# Patient Record
Sex: FEMALE | Race: WHITE | NOT HISPANIC OR LATINO | Employment: OTHER | URBAN - METROPOLITAN AREA
[De-identification: names, ages, dates, MRNs, and addresses within clinical notes are randomized per-mention and may not be internally consistent; named-entity substitution may affect disease eponyms.]

---

## 2017-01-17 DIAGNOSIS — I89.0 LYMPHEDEMA OF BOTH LOWER EXTREMITIES: ICD-10-CM

## 2017-01-17 RX ORDER — OXYCODONE HYDROCHLORIDE AND ACETAMINOPHEN 5; 325 MG/1; MG/1
1 TABLET ORAL 2 TIMES DAILY PRN
Qty: 60 TAB | Refills: 0 | Status: SHIPPED | OUTPATIENT
Start: 2017-01-17 | End: 2017-06-29 | Stop reason: SDUPTHER

## 2017-01-17 NOTE — TELEPHONE ENCOUNTER
Please remind Dorcas that per [facility]'s opioid policy we must see them every 90 days to continue prescribing their opioids.

## 2017-06-29 ENCOUNTER — OFFICE VISIT (OUTPATIENT)
Dept: MEDICAL GROUP | Facility: PHYSICIAN GROUP | Age: 62
End: 2017-06-29
Payer: COMMERCIAL

## 2017-06-29 VITALS
WEIGHT: 239 LBS | BODY MASS INDEX: 34.22 KG/M2 | OXYGEN SATURATION: 97 % | DIASTOLIC BLOOD PRESSURE: 84 MMHG | RESPIRATION RATE: 16 BRPM | SYSTOLIC BLOOD PRESSURE: 120 MMHG | TEMPERATURE: 97.2 F | HEART RATE: 72 BPM | HEIGHT: 70 IN

## 2017-06-29 DIAGNOSIS — Z13.6 SCREENING FOR CARDIOVASCULAR CONDITION: ICD-10-CM

## 2017-06-29 DIAGNOSIS — E66.9 OBESITY (BMI 30-39.9): ICD-10-CM

## 2017-06-29 DIAGNOSIS — I89.0 LYMPHEDEMA OF BOTH LOWER EXTREMITIES: ICD-10-CM

## 2017-06-29 DIAGNOSIS — Z13.29 SCREENING FOR ENDOCRINE DISORDER: ICD-10-CM

## 2017-06-29 PROCEDURE — 99214 OFFICE O/P EST MOD 30 MIN: CPT | Performed by: FAMILY MEDICINE

## 2017-06-29 RX ORDER — OXYCODONE HYDROCHLORIDE AND ACETAMINOPHEN 5; 325 MG/1; MG/1
1 TABLET ORAL 2 TIMES DAILY PRN
Qty: 60 TAB | Refills: 0 | Status: SHIPPED | OUTPATIENT
Start: 2017-06-29 | End: 2017-11-14 | Stop reason: SDUPTHER

## 2017-06-29 ASSESSMENT — PATIENT HEALTH QUESTIONNAIRE - PHQ9: CLINICAL INTERPRETATION OF PHQ2 SCORE: 0

## 2017-06-29 NOTE — PROGRESS NOTES
Chief Complaint   Patient presents with   • Medication Refill     pain med       HISTORY OF PRESENT ILLNESS: Patient is a 61 y.o. female established patient here today for the following concerns:    1. Lymphedema of both lower extremities  Here today for follow up on lymph edema.  She reports that it continues to be an aggrevation.  Working on weight reduction. Reports that it is worse when she's been on her feet all day. Improved with leg elevation. Continues to use her compression device. Needs a refill on her pain medication that she uses occasionally. 60 tabs of Percocet has lasted her 6 months. She does not mix this with any alcohol or other sedative medications.    2. Screening for cardiovascular condition  She will be due for lab work in September    3. Screening for endocrine disorder  As above    4. Obesity (BMI 30-39.9)  Counseled today      Past Medical, Social, and Family history reviewed and updated in EPIC    Allergies:Review of patient's allergies indicates no known allergies.    Current Outpatient Prescriptions   Medication Sig Dispense Refill   • oxycodone-acetaminophen (PERCOCET) 5-325 MG Tab Take 1 Tab by mouth 2 times a day as needed for Moderate Pain or Severe Pain. Take 1 or 2 tabs 60 Tab 0   • celecoxib (CELEBREX) 200 MG Cap Take 1 Cap by mouth every day. 60 Cap 5   • Cholecalciferol (VITAMIN D3) 5000 UNITS Cap Take 1 Cap by mouth every day.     • FIBER PO Take  by mouth every day.     • diphenhydrAMINE (BENADRYL) 25 MG Tab Take 25 mg by mouth every 6 hours as needed for Sleep.     • Ibuprofen-Diphenhydramine Cit (IBUPROFEN PM PO) Take  by mouth as needed.       No current facility-administered medications for this visit.         ROS:  Review of Systems   Constitutional: Negative for fever, chills, weight loss and malaise/fatigue.   HENT: Negative for ear pain, nosebleeds, congestion, sore throat and neck pain.    Eyes: Negative for blurred vision.   Respiratory: Negative for cough, sputum  "production, shortness of breath and wheezing.    Cardiovascular: Negative for chest pain, palpitations,  and leg swelling.   Gastrointestinal: Negative for heartburn, nausea, vomiting, diarrhea and abdominal pain.   Genitourinary: Negative for dysuria, urgency and frequency.   Musculoskeletal: Negative for myalgias, back pain and joint pain.   Skin: Negative for rash and itching.   Neurological: Negative for dizziness, tingling, tremors, sensory change, focal weakness and headaches.   Endo/Heme/Allergies: Does not bruise/bleed easily.   Psychiatric/Behavioral: Negative for depression, anxiety, suicidal ideas, insomnia and memory loss.      Exam:  Blood pressure 120/84, pulse 72, temperature 36.2 °C (97.2 °F), resp. rate 16, height 1.778 m (5' 10\"), weight 108.41 kg (239 lb), SpO2 97 %.    General:  Well nourished, well developed in NAD  Head is grossly normal.  Neck: Supple without JVD   Pulmonary:  Normal effort.   Cardiovascular: Regular rate  Extremities: no clubbing, cyanosis, or + left-sided pitting edema.  Psych: affect appropriate      Please note that this dictation was created using voice recognition software. I have made every reasonable attempt to correct obvious errors, but I expect that there are errors of grammar and possibly content that I did not discover before finalizing the note.    Assessment/Plan:  1. Lymphedema of both lower extremities  Continue with modifications including compression, elevation of the leg, increase exercise, decrease weight renewed  - oxycodone-acetaminophen (PERCOCET) 5-325 MG Tab; Take 1 Tab by mouth 2 times a day as needed for Moderate Pain or Severe Pain. Take 1 or 2 tabs  Dispense: 60 Tab; Refill: 0   reviewed and consistent. Patient cautioned on addictive nature of the medication    2. Screening for cardiovascular condition  - LIPID PROFILE; Future  - COMP METABOLIC PANEL; Future    3. Screening for endocrine disorder  - VITAMIN D,25 HYDROXY; Future    4. Obesity " (BMI 30-39.9)  - Patient identified as having weight management issue.  Appropriate orders and counseling given.    Six-month follow-up sooner when necessary

## 2017-06-29 NOTE — MR AVS SNAPSHOT
"        Dorcas Brennany   2017 7:40 AM   Office Visit   MRN: [id number]    Department:  [facility]   Dept Phone:  [phone]    Description:  Female : 1955   Provider:  Kenia Nick M.D.           Reason for Visit     Medication Refill pain med      Allergies as of 2017     No Known Allergies      You were diagnosed with     Lymphedema of both lower extremities   [6678772]       Screening for cardiovascular condition   [241052]       Screening for endocrine disorder   [7319200]       Obesity (BMI 30-39.9)   [230305]         Vital Signs     Blood Pressure Pulse Temperature Respirations Height Weight    120/84 mmHg 72 36.2 °C (97.2 °F) 16 1.778 m (5' 10\") 108.41 kg (239 lb)    Body Mass Index Oxygen Saturation Smoking Status             34.29 kg/m2 97% Never Smoker          Basic Information     Date Of Birth Sex Race Ethnicity Preferred Language    1955 Female White Non- English      Problem List              ICD-10-CM Priority Class Noted - Resolved    Lymphedema of both lower extremities I89.0   2016 - Present    History of bilateral knee replacement Z96.653   2016 - Present    Bilateral primary osteoarthritis of first carpometacarpal joints M18.0   2016 - Present    Obesity (BMI 30-39.9) E66.9   2017 - Present      Health Maintenance        Date Due Completion Dates    IMM DTaP/Tdap/Td Vaccine (1 - Tdap) 8/15/1973 ---    MAMMOGRAM 2017, 9/15/2016, 8/10/2015, 6/10/2013, 2012, 2009, 2009, 2008, 2008, 2007, 2007    PAP SMEAR 2019    COLONOSCOPY 2020, 2005            Current Immunizations     SHINGLES VACCINE 10/1/2014      Below and/or attached are the medications your provider expects you to take. Review all of your home medications and newly ordered medications with your provider and/or pharmacist. Follow medication instructions as directed by your provider and/or " pharmacist. Please keep your medication list with you and share with your provider. Update the information when medications are discontinued, doses are changed, or new medications (including over-the-counter products) are added; and carry medication information at all times in the event of emergency situations     Allergies:  No Known Allergies          Medications  Valid as of: June 29, 2017 - 11:28 AM    Generic Name Brand Name Tablet Size Instructions for use    Celecoxib (Cap) CELEBREX 200 MG Take 1 Cap by mouth every day.        Cholecalciferol (Cap) vitamin D3 5000 UNITS Take 1 Cap by mouth every day.        DiphenhydrAMINE HCl (Tab) BENADRYL 25 MG Take 25 mg by mouth every 6 hours as needed for Sleep.        Fiber   Take  by mouth every day.        Ibuprofen-Diphenhydramine Cit   Take  by mouth as needed.        Oxycodone-Acetaminophen (Tab) PERCOCET 5-325 MG Take 1 Tab by mouth 2 times a day as needed for Moderate Pain or Severe Pain. Take 1 or 2 tabs        .                 Medicines prescribed today were sent to:     [id number] - [place] - [address]    [address] [place] [address]    Phone: [phone] Fax: [phone]    Open 24 Hours?: No      Medication refill instructions:       If your prescription bottle indicates you have medication refills left, it is not necessary to call your provider’s office. Please contact your pharmacy and they will refill your medication.    If your prescription bottle indicates you do not have any refills left, you may request refills at any time through one of the following ways: The online [employer] system (except Urgent Care), by calling your provider’s office, or by asking your pharmacy to contact your provider’s office with a refill request. Medication refills are processed only during regular business hours and may not be available until the next business day. Your provider may request additional information or to have a follow-up visit with  you prior to refilling your medication.   *Please Note: Medication refills are assigned a new Rx number when refilled electronically. Your pharmacy may indicate that no refills were authorized even though a new prescription for the same medication is available at the pharmacy. Please request the medicine by name with the pharmacy before contacting your provider for a refill.        Your To Do List     Future Labs/Procedures Complete By Expires    COMP METABOLIC PANEL  As directed 6/29/2018    LIPID PROFILE  As directed 6/29/2018    VITAMIN D,25 HYDROXY  As directed 6/29/2018         [employer] Access Code: Activation code not generated  Current [employer] Status: Active

## 2017-08-10 ENCOUNTER — APPOINTMENT (OUTPATIENT)
Dept: ADMISSIONS | Facility: MEDICAL CENTER | Age: 62
End: 2017-08-10
Attending: ORTHOPAEDIC SURGERY
Payer: COMMERCIAL

## 2017-08-10 NOTE — OR NURSING
"Preadmit appointment: \" Preparing for your Procedure information\" sheet given to patient with verbal and written instructions. Patient instructed to continue prescribed medications through the day before surgery, instructed to take the following medications the day of surgery per anesthesia protocol: Percocet if needed  "

## 2017-08-22 ENCOUNTER — APPOINTMENT (OUTPATIENT)
Dept: RADIOLOGY | Facility: MEDICAL CENTER | Age: 62
End: 2017-08-22
Attending: ORTHOPAEDIC SURGERY
Payer: COMMERCIAL

## 2017-08-22 ENCOUNTER — HOSPITAL ENCOUNTER (OUTPATIENT)
Facility: MEDICAL CENTER | Age: 62
End: 2017-08-22
Attending: ORTHOPAEDIC SURGERY | Admitting: ORTHOPAEDIC SURGERY
Payer: COMMERCIAL

## 2017-08-22 VITALS
SYSTOLIC BLOOD PRESSURE: 125 MMHG | DIASTOLIC BLOOD PRESSURE: 85 MMHG | TEMPERATURE: 96.8 F | WEIGHT: 242.51 LBS | HEIGHT: 70 IN | OXYGEN SATURATION: 97 % | HEART RATE: 78 BPM | RESPIRATION RATE: 12 BRPM | BODY MASS INDEX: 34.72 KG/M2

## 2017-08-22 PROBLEM — M18.0 PRIMARY ARTHROSIS OF FIRST CARPOMETACARPAL JOINTS, BILATERAL: Status: ACTIVE | Noted: 2017-08-22

## 2017-08-22 PROCEDURE — A4565 SLINGS: HCPCS | Performed by: ORTHOPAEDIC SURGERY

## 2017-08-22 PROCEDURE — 160028 HCHG SURGERY MINUTES - 1ST 30 MINS LEVEL 3: Performed by: ORTHOPAEDIC SURGERY

## 2017-08-22 PROCEDURE — 700102 HCHG RX REV CODE 250 W/ 637 OVERRIDE(OP): Performed by: ORTHOPAEDIC SURGERY

## 2017-08-22 PROCEDURE — 700111 HCHG RX REV CODE 636 W/ 250 OVERRIDE (IP): Performed by: ORTHOPAEDIC SURGERY

## 2017-08-22 PROCEDURE — 160046 HCHG PACU - 1ST 60 MINS PHASE II: Performed by: ORTHOPAEDIC SURGERY

## 2017-08-22 PROCEDURE — 160002 HCHG RECOVERY MINUTES (STAT): Performed by: ORTHOPAEDIC SURGERY

## 2017-08-22 PROCEDURE — 700101 HCHG RX REV CODE 250

## 2017-08-22 PROCEDURE — 160047 HCHG PACU  - EA ADDL 30 MINS PHASE II: Performed by: ORTHOPAEDIC SURGERY

## 2017-08-22 PROCEDURE — 501480 HCHG STOCKINETTE: Performed by: ORTHOPAEDIC SURGERY

## 2017-08-22 PROCEDURE — 700111 HCHG RX REV CODE 636 W/ 250 OVERRIDE (IP)

## 2017-08-22 PROCEDURE — 160039 HCHG SURGERY MINUTES - EA ADDL 1 MIN LEVEL 3: Performed by: ORTHOPAEDIC SURGERY

## 2017-08-22 PROCEDURE — 501838 HCHG SUTURE GENERAL: Performed by: ORTHOPAEDIC SURGERY

## 2017-08-22 PROCEDURE — 160035 HCHG PACU - 1ST 60 MINS PHASE I: Performed by: ORTHOPAEDIC SURGERY

## 2017-08-22 PROCEDURE — A9270 NON-COVERED ITEM OR SERVICE: HCPCS

## 2017-08-22 PROCEDURE — 502000 HCHG MISC OR IMPLANTS RC 0278: Performed by: ORTHOPAEDIC SURGERY

## 2017-08-22 PROCEDURE — A9270 NON-COVERED ITEM OR SERVICE: HCPCS | Performed by: ORTHOPAEDIC SURGERY

## 2017-08-22 PROCEDURE — 700102 HCHG RX REV CODE 250 W/ 637 OVERRIDE(OP)

## 2017-08-22 PROCEDURE — 502576 HCHG PACK, HAND: Performed by: ORTHOPAEDIC SURGERY

## 2017-08-22 PROCEDURE — 700105 HCHG RX REV CODE 258: Performed by: ORTHOPAEDIC SURGERY

## 2017-08-22 PROCEDURE — A6222 GAUZE <=16 IN NO W/SAL W/O B: HCPCS | Performed by: ORTHOPAEDIC SURGERY

## 2017-08-22 PROCEDURE — 160048 HCHG OR STATISTICAL LEVEL 1-5: Performed by: ORTHOPAEDIC SURGERY

## 2017-08-22 PROCEDURE — 160009 HCHG ANES TIME/MIN: Performed by: ORTHOPAEDIC SURGERY

## 2017-08-22 PROCEDURE — 160025 RECOVERY II MINUTES (STATS): Performed by: ORTHOPAEDIC SURGERY

## 2017-08-22 DEVICE — SUTURE ANCHOR TWINFIX 2.8 WITH NEEDLES SMALL JOINT: Type: IMPLANTABLE DEVICE | Status: FUNCTIONAL

## 2017-08-22 RX ORDER — SODIUM CHLORIDE, SODIUM LACTATE, POTASSIUM CHLORIDE, CALCIUM CHLORIDE 600; 310; 30; 20 MG/100ML; MG/100ML; MG/100ML; MG/100ML
1000 INJECTION, SOLUTION INTRAVENOUS
Status: DISCONTINUED | OUTPATIENT
Start: 2017-08-22 | End: 2017-08-22 | Stop reason: HOSPADM

## 2017-08-22 RX ORDER — DIPHENHYDRAMINE HYDROCHLORIDE 50 MG/ML
25 INJECTION INTRAMUSCULAR; INTRAVENOUS EVERY 6 HOURS PRN
Status: DISCONTINUED | OUTPATIENT
Start: 2017-08-22 | End: 2017-08-22 | Stop reason: HOSPADM

## 2017-08-22 RX ORDER — HALOPERIDOL 5 MG/ML
1 INJECTION INTRAMUSCULAR EVERY 6 HOURS PRN
Status: DISCONTINUED | OUTPATIENT
Start: 2017-08-22 | End: 2017-08-22 | Stop reason: HOSPADM

## 2017-08-22 RX ORDER — OXYCODONE HYDROCHLORIDE AND ACETAMINOPHEN 5; 325 MG/1; MG/1
TABLET ORAL
Status: COMPLETED
Start: 2017-08-22 | End: 2017-08-22

## 2017-08-22 RX ORDER — OXYCODONE HYDROCHLORIDE 10 MG/1
10 TABLET ORAL
Status: DISCONTINUED | OUTPATIENT
Start: 2017-08-22 | End: 2017-08-22 | Stop reason: HOSPADM

## 2017-08-22 RX ORDER — ONDANSETRON 2 MG/ML
4 INJECTION INTRAMUSCULAR; INTRAVENOUS EVERY 4 HOURS PRN
Status: DISCONTINUED | OUTPATIENT
Start: 2017-08-22 | End: 2017-08-22 | Stop reason: HOSPADM

## 2017-08-22 RX ORDER — DEXAMETHASONE SODIUM PHOSPHATE 4 MG/ML
4 INJECTION, SOLUTION INTRA-ARTICULAR; INTRALESIONAL; INTRAMUSCULAR; INTRAVENOUS; SOFT TISSUE
Status: DISCONTINUED | OUTPATIENT
Start: 2017-08-22 | End: 2017-08-22 | Stop reason: HOSPADM

## 2017-08-22 RX ORDER — SCOLOPAMINE TRANSDERMAL SYSTEM 1 MG/1
1 PATCH, EXTENDED RELEASE TRANSDERMAL
Status: DISCONTINUED | OUTPATIENT
Start: 2017-08-22 | End: 2017-08-22 | Stop reason: HOSPADM

## 2017-08-22 RX ORDER — BUPIVACAINE HYDROCHLORIDE 5 MG/ML
INJECTION, SOLUTION EPIDURAL; INTRACAUDAL
Status: DISCONTINUED | OUTPATIENT
Start: 2017-08-22 | End: 2017-08-22 | Stop reason: HOSPADM

## 2017-08-22 RX ADMIN — SODIUM CHLORIDE, POTASSIUM CHLORIDE, SODIUM LACTATE AND CALCIUM CHLORIDE 1000 ML: 600; 310; 30; 20 INJECTION, SOLUTION INTRAVENOUS at 10:30

## 2017-08-22 RX ADMIN — OXYCODONE HYDROCHLORIDE AND ACETAMINOPHEN 2 TABLET: 5; 325 TABLET ORAL at 13:08

## 2017-08-22 RX ADMIN — FENTANYL CITRATE 50 MCG: 50 INJECTION, SOLUTION INTRAMUSCULAR; INTRAVENOUS at 13:10

## 2017-08-22 ASSESSMENT — PAIN SCALES - GENERAL
PAINLEVEL_OUTOF10: ASSUMED PAIN PRESENT
PAINLEVEL_OUTOF10: 0
PAINLEVEL_OUTOF10: 7

## 2017-08-22 NOTE — OR NURSING
1344  Pt to stage two via ady. Pt denies pain and nausea at this time. Pt getting dressed with help of CNA. CMS to LUE  intact.   1350 Pt up to chair with help of CNA. VSS.   1417 Explained discharge instructions to pt and pts  . Both express understanding   1423 Pt meets criteria to be discharged after uneventful stay in stage two

## 2017-08-22 NOTE — DISCHARGE INSTRUCTIONS
ACTIVITY: Rest and take it easy for the first 24 hours.  A responsible adult is recommended to remain with you during that time.  It is normal to feel sleepy.  We encourage you to not do anything that requires balance, judgment or coordination.    MILD FLU-LIKE SYMPTOMS ARE NORMAL. YOU MAY EXPERIENCE GENERALIZED MUSCLE ACHES, THROAT IRRITATION, HEADACHE AND/OR SOME NAUSEA.    FOR 24 HOURS DO NOT:  Drive, operate machinery or run household appliances.  Drink beer or alcoholic beverages.   Make important decisions or sign legal documents.    SPECIAL INSTRUCTIONS: Activity is to be non weight bearing to left hand. Ice and elevate. You are encouraged to frequently move your fingers.    DIET: To avoid nausea, slowly advance diet as tolerated, avoiding spicy or greasy foods for the first day.  Add more substantial food to your diet according to your physician's instructions.   INCREASE FLUIDS AND FIBER TO AVOID CONSTIPATION.    SURGICAL DRESSING/BATHING: Keep dressings clean and dry. Dressings will be removed at follow appointment with Dr. Anderson. You may shower tomorrow with dressings covered.    FOLLOW-UP APPOINTMENT:  A follow-up appointment should be arranged with your doctor; call to schedule.    You should CALL YOUR PHYSICIAN if you develop:  Fever greater than 101 degrees F.  Pain not relieved by medication, or persistent nausea or vomiting.  Excessive bleeding (blood soaking through dressing) or unexpected drainage from the wound.  Extreme redness or swelling around the incision site, drainage of pus or foul smelling drainage.  Inability to urinate or empty your bladder within 8 hours.    You should call 911 if you develop problems with breathing or chest pain.    If you are unable to contact your doctor or surgical center, you should go to the nearest emergency room or urgent care center.      Physician's telephone #: Dr. Anderson [phone]    If any questions arise, call your doctor.    f your doctor is not  available, please feel free to call the Surgical Center at [phone].  The Center is open Monday through Friday from 7AM to 7PM.    You can also call the HEALTH HOTLINE open 24 hours/day, 7 days/week and speak to a nurse at [phone], or toll free at [phone].    A registered nurse may call you a few days after your surgery to see how you are doing after your procedure.    MEDICATIONS: Resume taking daily medication.  Take prescribed pain medication with food.  If no medication is prescribed, you may take non-aspirin pain medication if needed.  PAIN MEDICATION CAN BE VERY CONSTIPATING.  Take a stool softener or laxative such as senokot, pericolace, or milk of magnesia if needed.    Prescription given for oxycodone.      Last pain medication (Percocet 10/650) given at 1:00 p.m.    If your physician has prescribed pain medication that includes Acetaminophen (Tylenol), do not take additional Acetaminophen (Tylenol) while taking the prescribed medication.    Depression / Suicide Risk    As you are discharged from this [facility] facility, it is important to learn how to keep safe from harming yourself.    Recognize the warning signs:  · Abrupt changes in personality, positive or negative- including increase in energy   · Giving away possessions  · Change in eating patterns- significant weight changes-  positive or negative  · Change in sleeping patterns- unable to sleep or sleeping all the time   · Unwillingness or inability to communicate  · Depression  · Unusual sadness, discouragement and loneliness  · Talk of wanting to die  · Neglect of personal appearance   · Rebelliousness- reckless behavior  · Withdrawal from people/activities they love  · Confusion- inability to concentrate     If you or a loved one observes any of these behaviors or has concerns about self-harm, here's what you can do:  · Talk about it- your feelings and reasons for harming yourself  · Remove any means that you might use to  hurt yourself (examples: pills, rope, extension cords, firearm)  · Get professional help from the community (Mental Health, Substance Abuse, psychological counseling)  · Do not be alone:Call your Safe Contact- someone whom you trust who will be there for you.  · Call your local CRISIS HOTLINE 632-0869 or 344-229-3415  · Call your local Children's Mobile Crisis Response Team [place] (460) 776-7125 or www.[employer]  · Call the toll free National Suicide Prevention Hotlines   · National Suicide Prevention Lifeline 376-216-IEFB (5429)  · National Hope Line Network 800-SUICIDE (945-7374)

## 2017-08-22 NOTE — OR NURSING
1246: To PACU post right MCP arthroplasty. Pt is extubated, breathing is spontaneous and unlabored. Fingers are pink and warm w/ brisk cap refill.  1305: Pt more awake, states pain is 7/10, will medicate.  1336: Pt states pain is tolerable, no nausea. Meets criteria for stage ll.

## 2017-08-22 NOTE — OR SURGEON
Operative Report    PreOp Diagnosis: L cmc and mcpj arthritis    PostOp Diagnosis: same    Procedure(s):  FINGER ARTHROPLASTY - CARPAL METACARPAL - Wound Class: Clean  TENDON TRANSFER - FLEXI CARPI RADIALIS - Wound Class: Clean  CAPSULOTOMY - METACARPAL PHALANGEAL JOINT CAPSULODESIS - Wound Class: Clean    Surgeon(s):  Magnus Anderson M.D.    Anesthesiologist/Type of Anesthesia:  Anesthesiologist: Nica Kirk M.D.  Anesthesia Technician: Tram Padilla/General    Surgical Staff:  Circulator: Kenyatta Garber R.N.  Scrub Person: Navi Rivers    Specimens:  * No specimens in log *    Estimated Blood Loss: min    Findings: endstage oa    Complications: none        8/22/2017 1:02 PM Magnus Anderson

## 2017-08-22 NOTE — IP AVS SNAPSHOT
8/22/2017    Dorcas Michael  [address] [place] [address]    Dear Dorcas:    [facility] wants to ensure your discharge home is safe and you or your loved ones have had all of your questions answered regarding your care after you leave the hospital.    Below is a list of resources and contact information should you have any questions regarding your hospital stay, follow-up instructions, or active medical symptoms.    Questions or Concerns Regarding… Contact   Medical Questions Related to Your Discharge  (7 days a week, 8am-5pm) Contact a Nurse Care Coordinator   [phone]   Medical Questions Not Related to Your Discharge  (24 hours a day / 7 days a week)  Contact the Nurse Health Line   [phone]    Medications or Discharge Instructions Refer to your discharge packet   or contact your [facility] Primary Care Provider   [phone]   Follow-up Appointment(s) Schedule your appointment via [employer]   or contact Scheduling [phone]   Billing Review your statement via [employer]  or contact Billing [phone]   Medical Records Review your records via [employer]   or contact Medical Records [phone]     You may receive a telephone call within two days of discharge. This call is to make certain you understand your discharge instructions and have the opportunity to have any questions answered. You can also easily access your medical information, test results and upcoming appointments via the [employer] free online health management tool. You can learn more and sign up at [employer]. For assistance setting up your [employer] account, please call [phone].    Once again, we want to ensure your discharge home is safe and that you have a clear understanding of any next steps in your care. If you have any questions or concerns, please do not hesitate to contact us, we are here for you. Thank you for choosing [facility] for your healthcare needs.    Sincerely,    Your [facility] Healthcare Team

## 2017-08-22 NOTE — IP AVS SNAPSHOT
" Home Care Instructions                                                                                                                Name:Dorcas Michael  Medical Record Number:[id number]  CSN: [id number]    YOB: 1955   Age: 62 y.o.  Sex: female  HT:1.778 m (5' 10\") WT: 110 kg (242 lb 8.1 oz)          Admit Date: 8/22/2017     Discharge Date:   Today's Date: 8/22/2017  Attending Doctor:  Magnus Anderson M.D.                  Allergies:  Review of patient's allergies indicates no known allergies.              Follow-up Information     1. Follow up with Magnus Anderson M.D..    Specialty:  Orthopaedics    Why:  10-14 days    Contact information    [address]  [address]  [facility] [address]  [phone]          Discharge Instructions         ACTIVITY: Rest and take it easy for the first 24 hours.  A responsible adult is recommended to remain with you during that time.  It is normal to feel sleepy.  We encourage you to not do anything that requires balance, judgment or coordination.    MILD FLU-LIKE SYMPTOMS ARE NORMAL. YOU MAY EXPERIENCE GENERALIZED MUSCLE ACHES, THROAT IRRITATION, HEADACHE AND/OR SOME NAUSEA.    FOR 24 HOURS DO NOT:  Drive, operate machinery or run household appliances.  Drink beer or alcoholic beverages.   Make important decisions or sign legal documents.    SPECIAL INSTRUCTIONS: Activity is to be non weight bearing to left hand. Ice and elevate. You are encouraged to frequently move your fingers.    DIET: To avoid nausea, slowly advance diet as tolerated, avoiding spicy or greasy foods for the first day.  Add more substantial food to your diet according to your physician's instructions.   INCREASE FLUIDS AND FIBER TO AVOID CONSTIPATION.    SURGICAL DRESSING/BATHING: Keep dressings clean and dry. Dressings will be removed at follow appointment with Dr. Anderson. You may shower tomorrow with dressings covered.    FOLLOW-UP APPOINTMENT:  A follow-up appointment should be arranged with your " doctor; call to schedule.    You should CALL YOUR PHYSICIAN if you develop:  Fever greater than 101 degrees F.  Pain not relieved by medication, or persistent nausea or vomiting.  Excessive bleeding (blood soaking through dressing) or unexpected drainage from the wound.  Extreme redness or swelling around the incision site, drainage of pus or foul smelling drainage.  Inability to urinate or empty your bladder within 8 hours.    You should call 911 if you develop problems with breathing or chest pain.    If you are unable to contact your doctor or surgical center, you should go to the nearest emergency room or urgent care center.      Physician's telephone #: Dr. Anderson [phone]    If any questions arise, call your doctor.    f your doctor is not available, please feel free to call the Surgical Center at [phone].  The Center is open Monday through Friday from 7AM to 7PM.    You can also call the Instant Information HOTLINE open 24 hours/day, 7 days/week and speak to a nurse at [phone], or toll free at [phone].    A registered nurse may call you a few days after your surgery to see how you are doing after your procedure.    MEDICATIONS: Resume taking daily medication.  Take prescribed pain medication with food.  If no medication is prescribed, you may take non-aspirin pain medication if needed.  PAIN MEDICATION CAN BE VERY CONSTIPATING.  Take a stool softener or laxative such as senokot, pericolace, or milk of magnesia if needed.    Prescription given for oxycodone.      Last pain medication (Percocet 10/650) given at 1:00 p.m.    If your physician has prescribed pain medication that includes Acetaminophen (Tylenol), do not take additional Acetaminophen (Tylenol) while taking the prescribed medication.    Depression / Suicide Risk    As you are discharged from this [facility] facility, it is important to learn how to keep safe from harming yourself.    Recognize the warning signs:  · Abrupt changes in  personality, positive or negative- including increase in energy   · Giving away possessions  · Change in eating patterns- significant weight changes-  positive or negative  · Change in sleeping patterns- unable to sleep or sleeping all the time   · Unwillingness or inability to communicate  · Depression  · Unusual sadness, discouragement and loneliness  · Talk of wanting to die  · Neglect of personal appearance   · Rebelliousness- reckless behavior  · Withdrawal from people/activities they love  · Confusion- inability to concentrate     If you or a loved one observes any of these behaviors or has concerns about self-harm, here's what you can do:  · Talk about it- your feelings and reasons for harming yourself  · Remove any means that you might use to hurt yourself (examples: pills, rope, extension cords, firearm)  · Get professional help from the community (Mental Health, Substance Abuse, psychological counseling)  · Do not be alone:Call your Safe Contact- someone whom you trust who will be there for you.  · Call your local CRISIS HOTLINE 628-9970 or 871-475-7621  · Call your local Children's Mobile Crisis Response Team [place] (329) 687-9445 or www[employer] Care  · Call the toll free National Suicide Prevention Hotlines   · National Suicide Prevention Lifeline 313-169-SXJY (9642)  · National Hope Line Network 800-SUICIDE (345-4323)       Medication List      CONTINUE taking these medications        Instructions    Morning Afternoon Evening Bedtime    celecoxib 200 MG Caps   Commonly known as:  CELEBREX        Take 1 Cap by mouth every day.   Dose:  200 mg                        diphenhydrAMINE 25 MG Tabs   Commonly known as:  BENADRYL        Take 25 mg by mouth every 6 hours as needed for Sleep.   Dose:  25 mg                        FIBER PO        Take  by mouth every day.                        oxycodone-acetaminophen 5-325 MG Tabs   Last time this was given:  2 Tabs on 8/22/2017  1:08 PM   Commonly  known as:  PERCOCET        Take 1 Tab by mouth 2 times a day as needed for Moderate Pain or Severe Pain. Take 1 or 2 tabs   Dose:  1 Tab                        vitamin D3 5000 units Caps        Take 1 Cap by mouth every day.   Dose:  1 Cap                                Medication Information     Above and/or attached are the medications your physician expects you to take upon discharge. Review all of your home medications and newly ordered medications with your doctor and/or pharmacist. Follow medication instructions as directed by your doctor and/or pharmacist. Please keep your medication list with you and share with your physician. Update the information when medications are discontinued, doses are changed, or new medications (including over-the-counter products) are added; and carry medication information at all times in the event of emergency situations.        Resources     Quit Smoking / Tobacco Use:    I understand the use of any tobacco products increases my chance of suffering from future heart disease or stroke and could cause other illnesses which may shorten my life. Quitting the use of tobacco products is the single most important thing I can do to improve my health. For further information on smoking / tobacco cessation call a Toll Free Quit Line at 1-300.735.6510 (*National Cancer [place]) or 1-613.228.6350 (American Lung Association) or you can access the web based program at www.lungusa.org.    [place] Tobacco Users Help Line:  (954) 683-6618       Toll Free: 1-103.828.7233  Quit Tobacco Program [facility] (654)641-1790    Crisis Hotline:    [place] Crisis Hotline:  2-289-HDNVGHB or 1-953.583.3495    [place] Crisis Hotline:    1-789.647.1259 or 568-819-8949    Discharge Survey:   Thank you for choosing [facility]. We hope we did everything we could to make your hospital stay a pleasant one. You may be receiving a survey and we would appreciate your time and participation  in answering the questions. Your input is very valuable to us in our efforts to improve our service to our patients and their families.            Signatures     My signature on this form indicates that:    1. I acknowledge receipt and understanding of these Home Care Instruction.  2. My questions regarding this information have been answered to my satisfaction.  3. I have formulated a plan with my discharge nurse to obtain my prescribed medications for home.    __________________________________      __________________________________                   Patient Signature                                 Guardian/Responsible Adult Signature      __________________________________                 __________       ________                       Nurse Signature                                               Date                 Time

## 2017-08-22 NOTE — OP REPORT
DATE OF SERVICE:  08/22/2017    PREOPERATIVE DIAGNOSES:  1.  Left carpometacarpal joint osteoarthritis.  2.  Metacarpophalangeal joint laxity secondary to #1.    POSTOPERATIVE DIAGNOSIS:  1.  Left carpometacarpal joint osteoarthritis.  2.  Metacarpophalangeal joint laxity secondary to #1.    PROCEDURES PERFORMED:  1.  Left CMC joint arthroplasty trapezial excision.  2.  Left FCR tendon transfer interposition.  3.  Left MCP joint capsulodesis.    SURGEON:  Magnus Anderson MD    ANESTHESIA:  General.    ESTIMATED BLOOD LOSS:  Minimal.    DRAINS:  None.    COMPLICATIONS:  None.    INDICATIONS:  Patient is a female.  She had her other side done.  She has had   thumb arthritis pain.  She understands operative and nonoperative   intervention.  She is at the point where she does wish to proceed with   operative intervention.  I explained the risks, benefits, complications, and   alternatives of surgery.  All questions were answered.  No guarantees were   given.  Signed consent was obtained.    DESCRIPTION OF PROCEDURE:  Patient was taken to the operating room and laid   supine on the table.  All bony prominences well padded.  Adequate general was   obtained without difficulty.  The left upper extremity was prepped and draped   in the usual sterile fashion using a ChloraPrep.  Limb was exsanguinated with   elevation.  Tourniquet was raised.  Total tourniquet time was a little over an   hour.  I made a V incision at the MCP joint through skin and subcutaneous   tissues, opened the flexor tendon sheath.  I opened the capsule joint,   irrigated out the MCP joint.  I roughened out the head of the metacarpal neck.    I placed an anchor from [employer].  Subsequently, I then used this to   suture down the volar plate to the roughened up area using the previously   placed anchors.  These were then buried underneath the muscle.  The wound was   irrigated.  To protect the repair, I placed a 0.54 K wire across the joint in    slight flexion and the wire was bent, cut, viewed under fluoroscopy.  Wound   was irrigated, closed with nylon in simple fashion.  The neurovascular   structures were identified and intact.  We then made our curved incision   around the thenar muscles of the CMC joint down the FCR tendon.  Full   thickness flaps elevated.  There were endstage degenerative changes, the large   osteophytes.  I removed the trapezium in its entirety.  I prepared the base   of the thumb metacarpal _____ off osteophytes using a bur getting down to   cancellous bone.  I placed 2 more [employer] anchors in the base of the   thumb metacarpal.  Due to the arthritic changes pantrapezial, I also took down   about 3/4 of the scaphotrapezoid joint.  The wounds were irrigated to try and   prevent any collapse of the thumb, she already had some laxity.  I went   proximally in the forearm and then identified the FCR tendon and freed it from   surrounding attachments, transected it and pulled it out the distal wound.    The proximal wound was irrigated, closed with nylon.  I freed it down to its   insertion on the base of the second metacarpal.  I then sutured it to the   cancellous bone with the previously placed anchors.  The remainder of the FCR   tendon was rolled and used as an interpositional spacer, secured in place   using the previous anchors.  The thenar muscles closed with Vicryl, skin with   nylon, local without epinephrine was injected.  It was viewed under   fluoroscopy, found to be in nice suspension plasty in good position.  The   local without epinephrine was injected.  Sterile dressing of Xeroform, 4x4,   Sof-Rol, and thumb spica splint was applied.  All needle and sponge counts   were correct.  Patient tolerated the procedure well and was transferred to   recovery room in stable condition.       ____________________________________     MD KEYONA MARCUS / KAELYN    DD:  08/22/2017 13:15:06  DT:  08/22/2017  13:45:49    D#:  [id number]  Job#:  [id number]

## 2017-10-19 DIAGNOSIS — I89.0 LYMPHEDEMA OF BOTH LOWER EXTREMITIES: ICD-10-CM

## 2017-10-19 NOTE — TELEPHONE ENCOUNTER
Was the patient seen in the last year in this department? Yes     Does patient have an active prescription for medications requested? No     Received Request Via: Pharmacy      Pt met protocol?: Yes, labs 9/16 ov 6/17

## 2017-10-20 RX ORDER — CELECOXIB 200 MG/1
CAPSULE ORAL
Qty: 60 CAP | Refills: 5 | Status: SHIPPED | OUTPATIENT
Start: 2017-10-20 | End: 2018-07-03 | Stop reason: SDUPTHER

## 2017-11-01 ENCOUNTER — HOSPITAL ENCOUNTER (OUTPATIENT)
Dept: LAB | Facility: MEDICAL CENTER | Age: 62
End: 2017-11-01
Attending: FAMILY MEDICINE
Payer: COMMERCIAL

## 2017-11-01 DIAGNOSIS — Z13.6 SCREENING FOR CARDIOVASCULAR CONDITION: ICD-10-CM

## 2017-11-01 DIAGNOSIS — Z13.29 SCREENING FOR ENDOCRINE DISORDER: ICD-10-CM

## 2017-11-01 LAB
25(OH)D3 SERPL-MCNC: 68 NG/ML (ref 30–100)
ALBUMIN SERPL BCP-MCNC: 4 G/DL (ref 3.2–4.9)
ALBUMIN/GLOB SERPL: 1.5 G/DL
ALP SERPL-CCNC: 53 U/L (ref 30–99)
ALT SERPL-CCNC: 18 U/L (ref 2–50)
ANION GAP SERPL CALC-SCNC: 8 MMOL/L (ref 0–11.9)
AST SERPL-CCNC: 18 U/L (ref 12–45)
BILIRUB SERPL-MCNC: 1 MG/DL (ref 0.1–1.5)
BUN SERPL-MCNC: 17 MG/DL (ref 8–22)
CALCIUM SERPL-MCNC: 9.5 MG/DL (ref 8.5–10.5)
CHLORIDE SERPL-SCNC: 108 MMOL/L (ref 96–112)
CHOLEST SERPL-MCNC: 170 MG/DL (ref 100–199)
CO2 SERPL-SCNC: 24 MMOL/L (ref 20–33)
CREAT SERPL-MCNC: 0.7 MG/DL (ref 0.5–1.4)
GFR SERPL CREATININE-BSD FRML MDRD: >60 ML/MIN/1.73 M 2
GLOBULIN SER CALC-MCNC: 2.7 G/DL (ref 1.9–3.5)
GLUCOSE SERPL-MCNC: 96 MG/DL (ref 65–99)
HDLC SERPL-MCNC: 75 MG/DL
LDLC SERPL CALC-MCNC: 82 MG/DL
POTASSIUM SERPL-SCNC: 4.1 MMOL/L (ref 3.6–5.5)
PROT SERPL-MCNC: 6.7 G/DL (ref 6–8.2)
SODIUM SERPL-SCNC: 140 MMOL/L (ref 135–145)
TRIGL SERPL-MCNC: 66 MG/DL (ref 0–149)

## 2017-11-01 PROCEDURE — 36415 COLL VENOUS BLD VENIPUNCTURE: CPT

## 2017-11-01 PROCEDURE — 80061 LIPID PANEL: CPT

## 2017-11-01 PROCEDURE — 82306 VITAMIN D 25 HYDROXY: CPT

## 2017-11-01 PROCEDURE — 80053 COMPREHEN METABOLIC PANEL: CPT

## 2017-11-14 ENCOUNTER — OFFICE VISIT (OUTPATIENT)
Dept: MEDICAL GROUP | Facility: PHYSICIAN GROUP | Age: 62
End: 2017-11-14
Payer: COMMERCIAL

## 2017-11-14 VITALS
TEMPERATURE: 97 F | RESPIRATION RATE: 14 BRPM | WEIGHT: 243 LBS | OXYGEN SATURATION: 96 % | HEIGHT: 70 IN | HEART RATE: 70 BPM | DIASTOLIC BLOOD PRESSURE: 80 MMHG | BODY MASS INDEX: 34.79 KG/M2 | SYSTOLIC BLOOD PRESSURE: 122 MMHG

## 2017-11-14 DIAGNOSIS — L30.4 INTERTRIGO: ICD-10-CM

## 2017-11-14 DIAGNOSIS — Z96.653 HISTORY OF BILATERAL KNEE REPLACEMENT: ICD-10-CM

## 2017-11-14 DIAGNOSIS — Z23 NEED FOR VACCINATION: ICD-10-CM

## 2017-11-14 DIAGNOSIS — M54.2 CERVICALGIA: ICD-10-CM

## 2017-11-14 DIAGNOSIS — K13.0 ANGULAR CHEILITIS: ICD-10-CM

## 2017-11-14 DIAGNOSIS — Z12.39 SCREENING FOR BREAST CANCER: ICD-10-CM

## 2017-11-14 DIAGNOSIS — M18.0 PRIMARY ARTHROSIS OF FIRST CARPOMETACARPAL JOINTS, BILATERAL: ICD-10-CM

## 2017-11-14 DIAGNOSIS — I89.0 LYMPHEDEMA OF BOTH LOWER EXTREMITIES: ICD-10-CM

## 2017-11-14 PROCEDURE — 99214 OFFICE O/P EST MOD 30 MIN: CPT | Mod: 25 | Performed by: FAMILY MEDICINE

## 2017-11-14 PROCEDURE — 90715 TDAP VACCINE 7 YRS/> IM: CPT | Performed by: FAMILY MEDICINE

## 2017-11-14 PROCEDURE — 90471 IMMUNIZATION ADMIN: CPT | Performed by: FAMILY MEDICINE

## 2017-11-14 RX ORDER — NYSTATIN 100000 U/G
1 CREAM TOPICAL 2 TIMES DAILY
Qty: 1 TUBE | Refills: 5 | Status: SHIPPED | OUTPATIENT
Start: 2017-11-14 | End: 2018-07-03 | Stop reason: SDUPTHER

## 2017-11-14 RX ORDER — OXYCODONE HYDROCHLORIDE AND ACETAMINOPHEN 5; 325 MG/1; MG/1
1 TABLET ORAL 2 TIMES DAILY PRN
Qty: 60 TAB | Refills: 0 | Status: SHIPPED | OUTPATIENT
Start: 2017-11-14 | End: 2018-07-03 | Stop reason: SDUPTHER

## 2017-11-14 NOTE — PROGRESS NOTES
Chief Complaint   Patient presents with   • Medication Refill     pain med refill       HISTORY OF PRESENT ILLNESS: Patient is a 62 y.o. female established patient here today for the following concerns:    1. Screening for breast cancer  Due for mammography.  Had deferred it due to hand surgery.  Now ready to pursue.  No breast changes noted.     2. Need for vaccination  Due for Tdap     3. Primary arthrosis of first carpometacarpal joints, bilateral  4. Lymphedema of both lower extremities  5. History of bilateral knee replacement  Requests refill on Percocet.  Uses only in flair of pain when the celebrex is not cutting it.  She last filled over 2 months ago.  She typically will take 1-2 a few times per week.  No adverse effects noted.     6. Cervicalgia  Reports that she did travel to visit family and when she returned has been experiencing some neck pain with popping and clicking.  Has caused some headaches.  No fevers, chills.  No radicular symptoms.      7. Angular cheilitis  Reports she has struggled for years with cracking of the corners of her mouth which seem to be better in more humid environment.  She has also tried topical antifungals, topical petroleum products all of which have not been helpful.     8. Intertrigo  Lastly, has had rash intermittently under the breasts that improves sometimes with steroid use.  Typically where the skin contacts itself.  Worse when she becomes warm or sweating.        Past Medical, Social, and Family history reviewed and updated in EPIC    Allergies:Review of patient's allergies indicates no known allergies.    Current Outpatient Prescriptions   Medication Sig Dispense Refill   • oxycodone-acetaminophen (PERCOCET) 5-325 MG Tab Take 1 Tab by mouth 2 times a day as needed for Moderate Pain or Severe Pain. Take 1 or 2 tabs 60 Tab 0   • nystatin (MYCOSTATIN) 819886 UNIT/GM Cream topical cream Apply 1 g to affected area(s) 2 times a day. 1 Tube 5   • celecoxib (CELEBREX) 200  "MG Cap TAKE 1 CAPSULE BY MOUTH EVERY DAY. 60 Cap 5   • Cholecalciferol (VITAMIN D3) 5000 UNITS Cap Take 1 Cap by mouth every day.     • FIBER PO Take  by mouth every day.     • diphenhydrAMINE (BENADRYL) 25 MG Tab Take 25 mg by mouth every 6 hours as needed for Sleep.       No current facility-administered medications for this visit.          ROS:  Review of Systems   Constitutional: Negative for fever, chills, weight loss and malaise/fatigue.   HENT: Negative for ear pain, nosebleeds, congestion, sore throat and neck pain.    Eyes: Negative for blurred vision.   Respiratory: Negative for cough, sputum production, shortness of breath and wheezing.    Cardiovascular: Negative for chest pain, palpitations,  and leg swelling.   Gastrointestinal: Negative for heartburn, nausea, vomiting, diarrhea and abdominal pain.   Genitourinary: Negative for dysuria, urgency and frequency.   Musculoskeletal: Negative for myalgias, back pain and joint pain.   Skin: Negative for rash and itching.   Neurological: Negative for dizziness, tingling, tremors, sensory change, focal weakness and headaches.   Endo/Heme/Allergies: Does not bruise/bleed easily.   Psychiatric/Behavioral: Negative for depression, anxiety, suicidal ideas, insomnia and memory loss.      Exam:  Blood pressure 122/80, pulse 70, temperature 36.1 °C (97 °F), resp. rate 14, height 1.778 m (5' 10\"), weight 110.2 kg (243 lb), SpO2 96 %.    General:  Well nourished, well developed in NAD  Head is grossly normal.  Neck: Supple without JVD   Pulmonary:  Normal effort.   Cardiovascular: Regular rate  Extremities: no clubbing, cyanosis, or edema.  Psych: affect appropriate      Please note that this dictation was created using voice recognition software. I have made every reasonable attempt to correct obvious errors, but I expect that there are errors of grammar and possibly content that I did not discover before finalizing the note.    Assessment/Plan:  1. Screening for " breast cancer  - MA-SCREEN MAMMO W/CAD-BILAT; Future    2. Need for vaccination  - TDAP VACCINE =>8YO IM    3. Primary arthrosis of first carpometacarpal joints, bilateral  Continue hand therapy exercises s/p surgery    4. Lymphedema of both lower extremities  Continue prn use.  Discussed risk benefits and limitations.  Continue celebrex.   reviewed and consistent.   - oxycodone-acetaminophen (PERCOCET) 5-325 MG Tab; Take 1 Tab by mouth 2 times a day as needed for Moderate Pain or Severe Pain. Take 1 or 2 tabs  Dispense: 60 Tab; Refill: 0    5. History of bilateral knee replacement  Continue celebrex for pain relief, percocet for severe breakthrough.     6. Cervicalgia  May try chiropractic care vs PT.  Continue current meds, discussed ROM.  No meningeal signs.     7. Angular cheilitis  Trial of B complex     8. Intertrigo  Start   - nystatin (MYCOSTATIN) 497048 UNIT/GM Cream topical cream; Apply 1 g to affected area(s) 2 times a day.  Dispense: 1 Tube; Refill: 5    3 month follow up

## 2017-11-20 ENCOUNTER — HOSPITAL ENCOUNTER (OUTPATIENT)
Dept: RADIOLOGY | Facility: MEDICAL CENTER | Age: 62
End: 2017-11-20
Attending: CHIROPRACTOR
Payer: COMMERCIAL

## 2017-11-20 DIAGNOSIS — M54.2 CERVICALGIA: ICD-10-CM

## 2017-11-20 PROCEDURE — 72040 X-RAY EXAM NECK SPINE 2-3 VW: CPT

## 2018-03-08 ENCOUNTER — PATIENT OUTREACH (OUTPATIENT)
Dept: HEALTH INFORMATION MANAGEMENT | Facility: OTHER | Age: 63
End: 2018-03-08

## 2018-03-16 ENCOUNTER — APPOINTMENT (OUTPATIENT)
Dept: RADIOLOGY | Facility: MEDICAL CENTER | Age: 63
End: 2018-03-16
Attending: FAMILY MEDICINE
Payer: COMMERCIAL

## 2018-03-29 ENCOUNTER — HOSPITAL ENCOUNTER (OUTPATIENT)
Dept: RADIOLOGY | Facility: MEDICAL CENTER | Age: 63
End: 2018-03-29
Attending: FAMILY MEDICINE
Payer: COMMERCIAL

## 2018-03-29 DIAGNOSIS — Z12.39 SCREENING FOR BREAST CANCER: ICD-10-CM

## 2018-03-29 PROCEDURE — 77067 SCR MAMMO BI INCL CAD: CPT

## 2018-04-25 ENCOUNTER — PATIENT OUTREACH (OUTPATIENT)
Dept: HEALTH INFORMATION MANAGEMENT | Facility: OTHER | Age: 63
End: 2018-04-25

## 2018-07-03 ENCOUNTER — OFFICE VISIT (OUTPATIENT)
Dept: MEDICAL GROUP | Facility: PHYSICIAN GROUP | Age: 63
End: 2018-07-03
Payer: COMMERCIAL

## 2018-07-03 VITALS
BODY MASS INDEX: 33.9 KG/M2 | OXYGEN SATURATION: 98 % | DIASTOLIC BLOOD PRESSURE: 86 MMHG | WEIGHT: 236.8 LBS | RESPIRATION RATE: 16 BRPM | HEART RATE: 78 BPM | TEMPERATURE: 97.3 F | SYSTOLIC BLOOD PRESSURE: 122 MMHG | HEIGHT: 70 IN

## 2018-07-03 DIAGNOSIS — L30.4 INTERTRIGO: ICD-10-CM

## 2018-07-03 DIAGNOSIS — M77.12 LATERAL EPICONDYLITIS OF LEFT ELBOW: ICD-10-CM

## 2018-07-03 DIAGNOSIS — E66.9 OBESITY, CLASS I, BMI 30.0-34.9 (SEE ACTUAL BMI): ICD-10-CM

## 2018-07-03 DIAGNOSIS — I89.0 LYMPHEDEMA OF BOTH LOWER EXTREMITIES: ICD-10-CM

## 2018-07-03 PROCEDURE — 99214 OFFICE O/P EST MOD 30 MIN: CPT | Performed by: FAMILY MEDICINE

## 2018-07-03 RX ORDER — NYSTATIN 100000 U/G
1 CREAM TOPICAL 2 TIMES DAILY
Qty: 1 TUBE | Refills: 5 | Status: SHIPPED | OUTPATIENT
Start: 2018-07-03 | End: 2018-12-13 | Stop reason: SDUPTHER

## 2018-07-03 RX ORDER — CELECOXIB 200 MG/1
200 CAPSULE ORAL
Qty: 60 CAP | Refills: 5 | Status: SHIPPED | OUTPATIENT
Start: 2018-07-03 | End: 2018-10-09 | Stop reason: SDUPTHER

## 2018-07-03 RX ORDER — OXYCODONE HYDROCHLORIDE AND ACETAMINOPHEN 5; 325 MG/1; MG/1
1 TABLET ORAL 2 TIMES DAILY PRN
Qty: 60 TAB | Refills: 0 | Status: SHIPPED | OUTPATIENT
Start: 2018-07-03 | End: 2018-12-13 | Stop reason: SDUPTHER

## 2018-07-03 ASSESSMENT — PATIENT HEALTH QUESTIONNAIRE - PHQ9: CLINICAL INTERPRETATION OF PHQ2 SCORE: 0

## 2018-07-03 NOTE — PROGRESS NOTES
Chief Complaint   Patient presents with   • Lymphedema Aquired     pain med   • Elbow Pain     lft elbow pain x 3 mo       HISTORY OF PRESENT ILLNESS: Patient is a 62 y.o. female established patient here today for the following concerns:    1. Lymphedema of both lower extremities  Here today for follow up on lymphedema chronic pain.  Reports that she has been wearing her compression sleeve with some help.  Requests refill on Celebrex and Percocet to manage pain symptoms associated with the swelling.      2. Intertrigo  Due for refill on nystatin Rx.  She reports no new rashes.      3. Lateral epicondylitis of left elbow  Reports several weeks of left lateral elbow pain that is sharp, worse when picking up objects, radiates down the forearm.  No swelling or redness.  No trauma.     4. Obesity, Class I, BMI 30.0-34.9 (see actual BMI)  Working on weight reduction.   And trying to increase weight.      Also has had URI for 4 days with cough, seems to be improving    Past Medical, Social, and Family history reviewed and updated in EPIC    Allergies:Patient has no known allergies.    Current Outpatient Prescriptions   Medication Sig Dispense Refill   • oxyCODONE-acetaminophen (PERCOCET) 5-325 MG Tab Take 1 Tab by mouth 2 times a day as needed for Moderate Pain or Severe Pain for up to 30 days. Take 1 or 2 tabs 60 Tab 0   • celecoxib (CELEBREX) 200 MG Cap Take 1 Cap by mouth every day. 60 Cap 5   • nystatin (MYCOSTATIN) 310167 UNIT/GM Cream topical cream Apply 1 g to affected area(s) 2 times a day. 1 Tube 5   • Cholecalciferol (VITAMIN D3) 5000 UNITS Cap Take 1 Cap by mouth every day.     • FIBER PO Take  by mouth every day.     • diphenhydrAMINE (BENADRYL) 25 MG Tab Take 25 mg by mouth every 6 hours as needed for Sleep.       No current facility-administered medications for this visit.          ROS:  Review of Systems   Constitutional: Negative for fever, chills, weight loss and malaise/fatigue.   HENT: Negative for ear  "pain, nosebleeds, congestion, sore throat and neck pain.    Eyes: Negative for blurred vision.   Respiratory: Negative for cough, sputum production, shortness of breath and wheezing.    Cardiovascular: Negative for chest pain, palpitations,  and leg swelling.   Gastrointestinal: Negative for heartburn, nausea, vomiting, diarrhea and abdominal pain.   Genitourinary: Negative for dysuria, urgency and frequency.   Musculoskeletal: Negative for myalgias, back pain and joint pain.   Skin: Negative for rash and itching.   Neurological: Negative for dizziness, tingling, tremors, sensory change, focal weakness and headaches.   Endo/Heme/Allergies: Does not bruise/bleed easily.   Psychiatric/Behavioral: Negative for depression, anxiety, suicidal ideas, insomnia and memory loss.      Exam:  Blood pressure 122/86, pulse 78, temperature 36.3 °C (97.3 °F), resp. rate 16, height 1.778 m (5' 10\"), weight 107.4 kg (236 lb 12.8 oz), SpO2 98 %.    General:  Well nourished, well developed in NAD  Head is grossly normal.  Neck: Supple without JVD   Pulmonary:  Normal effort. RLL rhonchi, clear with cough   Cardiovascular: Regular rate  Extremities: no clubbing, cyanosis, or edema.  Psych: affect appropriate      Please note that this dictation was created using voice recognition software. I have made every reasonable attempt to correct obvious errors, but I expect that there are errors of grammar and possibly content that I did not discover before finalizing the note.    Assessment/Plan:  1. Lymphedema of both lower extremities   reviewed consistent.   - oxyCODONE-acetaminophen (PERCOCET) 5-325 MG Tab; Take 1 Tab by mouth 2 times a day as needed for Moderate Pain or Severe Pain for up to 30 days. Take 1 or 2 tabs  Dispense: 60 Tab; Refill: 0  - celecoxib (CELEBREX) 200 MG Cap; Take 1 Cap by mouth every day.  Dispense: 60 Cap; Refill: 5    2. Intertrigo  - nystatin (MYCOSTATIN) 897830 UNIT/GM Cream topical cream; Apply 1 g to " affected area(s) 2 times a day.  Dispense: 1 Tube; Refill: 5    3. Lateral epicondylitis of left elbow  ICE, Off-loading forearm strap.    4. Obesity, Class I, BMI 30.0-34.9 (see actual BMI)  - Patient identified as having weight management issue.  Appropriate orders and counseling given.      If URI symptoms not improving in the next week she will let us know and I'd like to recheck lung exam on the [facility]

## 2018-07-06 ENCOUNTER — PATIENT MESSAGE (OUTPATIENT)
Dept: MEDICAL GROUP | Facility: PHYSICIAN GROUP | Age: 63
End: 2018-07-06

## 2018-07-06 DIAGNOSIS — J40 BRONCHITIS: ICD-10-CM

## 2018-07-06 RX ORDER — PROMETHAZINE HYDROCHLORIDE, PHENYLEPHRINE HYDROCHLORIDE AND CODEINE PHOSPHATE 6.25; 5; 1 MG/5ML; MG/5ML; MG/5ML
5 SOLUTION ORAL EVERY 8 HOURS PRN
Qty: 280 ML | Refills: 0 | Status: SHIPPED
Start: 2018-07-06 | End: 2018-07-11

## 2018-07-06 RX ORDER — PROMETHAZINE HYDROCHLORIDE, PHENYLEPHRINE HYDROCHLORIDE AND CODEINE PHOSPHATE 6.25; 5; 1 MG/5ML; MG/5ML; MG/5ML
5 SOLUTION ORAL EVERY 8 HOURS PRN
Qty: 280 ML | Refills: 0 | Status: SHIPPED
Start: 2018-07-06 | End: 2018-07-06 | Stop reason: SDUPTHER

## 2018-07-06 NOTE — TELEPHONE ENCOUNTER
From: Dorcas Michael  To: Kenia Nick M.D.  Sent: 7/6/2018 1:32 PM PDT  Subject: Non-Urgent Medical Question    My pharmacy does not have the prescription for the cough medicine. It's [facility] [address]. Thank you.  ----- Message -----  From: Kenia Nick M.D.  Sent: 7/6/2018 8:26 AM PDT  To: Dorcas Michael  Subject: RE: Non-Urgent Medical Question  Gen Toscano! I'm sorry you are not completely better. I will send in the cough syrup. It has codeine in it, so please do not take the percocet with it.     Kenia    ----- Message -----   From: Dorcas Michael   Sent: 7/6/2018 7:35 AM PDT   To: Kenia Nick M.D.  Subject: Non-Urgent Medical Question    I saw Dr. Nick on Tuesday 7/3 and at that time I had a cold and a cough. She said to let her know if it was no better. I believe it is better but the cough has switched from a deep phlegmy cough to a drier one and it keeps me awake much of the night. Is it possible the doctor could call in a prescription cough medicine that might calm the cough so I can sleep?! Thank you.

## 2018-10-04 ENCOUNTER — PATIENT OUTREACH (OUTPATIENT)
Dept: HEALTH INFORMATION MANAGEMENT | Facility: OTHER | Age: 63
End: 2018-10-04

## 2018-10-09 ENCOUNTER — PATIENT MESSAGE (OUTPATIENT)
Dept: MEDICAL GROUP | Facility: PHYSICIAN GROUP | Age: 63
End: 2018-10-09

## 2018-10-09 DIAGNOSIS — I89.0 LYMPHEDEMA OF BOTH LOWER EXTREMITIES: ICD-10-CM

## 2018-10-09 RX ORDER — CELECOXIB 200 MG/1
200 CAPSULE ORAL
Qty: 180 CAP | Refills: 3 | Status: SHIPPED | OUTPATIENT
Start: 2018-10-09 | End: 2019-01-07

## 2018-12-13 ENCOUNTER — OFFICE VISIT (OUTPATIENT)
Dept: MEDICAL GROUP | Facility: PHYSICIAN GROUP | Age: 63
End: 2018-12-13
Payer: COMMERCIAL

## 2018-12-13 VITALS
HEIGHT: 70 IN | HEART RATE: 66 BPM | BODY MASS INDEX: 34.93 KG/M2 | TEMPERATURE: 97.1 F | WEIGHT: 244 LBS | OXYGEN SATURATION: 96 % | DIASTOLIC BLOOD PRESSURE: 80 MMHG | RESPIRATION RATE: 14 BRPM | SYSTOLIC BLOOD PRESSURE: 122 MMHG

## 2018-12-13 DIAGNOSIS — I89.0 LYMPHEDEMA OF BOTH LOWER EXTREMITIES: ICD-10-CM

## 2018-12-13 DIAGNOSIS — Z13.6 SCREENING FOR CARDIOVASCULAR CONDITION: ICD-10-CM

## 2018-12-13 DIAGNOSIS — L30.4 INTERTRIGO: ICD-10-CM

## 2018-12-13 PROCEDURE — 99214 OFFICE O/P EST MOD 30 MIN: CPT | Performed by: FAMILY MEDICINE

## 2018-12-13 RX ORDER — OXYCODONE HYDROCHLORIDE AND ACETAMINOPHEN 5; 325 MG/1; MG/1
1 TABLET ORAL 2 TIMES DAILY PRN
Qty: 60 TAB | Refills: 0 | Status: SHIPPED | OUTPATIENT
Start: 2018-12-13 | End: 2019-08-01 | Stop reason: SDUPTHER

## 2018-12-13 RX ORDER — NYSTATIN 100000 U/G
1 CREAM TOPICAL 2 TIMES DAILY
Qty: 3 TUBE | Refills: 3 | Status: SHIPPED | OUTPATIENT
Start: 2018-12-13 | End: 2020-04-29

## 2018-12-13 NOTE — PROGRESS NOTES
"Chief Complaint   Patient presents with   • Labs Only   • Chronic Opiate Therapy     norco       HISTORY OF PRESENT ILLNESS: Patient is a 63 y.o. female established patient here today for the following concerns:    1. Lymphedema of both lower extremities  Here for follow up.  Continues to struggle with chronic painful lymphedema of the lower extremities.  Worse when she is on her legs or having to walk more.  She finds that it improves with exercise and elevation of the legs.  Typically uses compression, massage to try to prevent worsening.  NO redness, warmth or s/sx of infection.  Needs refill on percocet which she uses very occasionally maybe 1 time per week if she \"overdoes it\".      2. Intertrigo  Requests refill on nystatin for rash between skin folds.  Denies any worsening rash, redness, or pain.      3. Screening for cardiovascular condition  Requests updated wellness labs.  She denies any new symptoms.        Past Medical, Social, and Family history reviewed and updated in EPIC    Allergies:Patient has no known allergies.    Current Outpatient Prescriptions   Medication Sig Dispense Refill   • nystatin (MYCOSTATIN) 700368 UNIT/GM Cream topical cream Apply 1 g to affected area(s) 2 times a day. 3 Tube 3   • oxyCODONE-acetaminophen (PERCOCET) 5-325 MG Tab Take 1 Tab by mouth 2 times a day as needed for Moderate Pain or Severe Pain for up to 30 days. Take 1 or 2 tabs 60 Tab 0   • celecoxib (CELEBREX) 200 MG Cap Take 1 Cap by mouth every day for 90 days. 180 Cap 3   • Cholecalciferol (VITAMIN D3) 5000 UNITS Cap Take 1 Cap by mouth every day.     • FIBER PO Take  by mouth every day.     • diphenhydrAMINE (BENADRYL) 25 MG Tab Take 25 mg by mouth every 6 hours as needed for Sleep.       No current facility-administered medications for this visit.          ROS:  Review of Systems   Constitutional: Negative for fever, chills, weight loss and malaise/fatigue.   HENT: Negative for ear pain, nosebleeds, congestion, " "sore throat and neck pain.    Eyes: Negative for blurred vision.   Respiratory: Negative for cough, sputum production, shortness of breath and wheezing.    Cardiovascular: Negative for chest pain, palpitations,  and leg swelling.   Gastrointestinal: Negative for heartburn, nausea, vomiting, diarrhea and abdominal pain.   Genitourinary: Negative for dysuria, urgency and frequency.   Musculoskeletal: Negative for myalgias, back pain and joint pain.   Skin: Negative for rash and itching.   Neurological: Negative for dizziness, tingling, tremors, sensory change, focal weakness and headaches.   Endo/Heme/Allergies: Does not bruise/bleed easily.   Psychiatric/Behavioral: Negative for depression, anxiety, suicidal ideas, insomnia and memory loss.      Exam:  Blood pressure 122/80, pulse 66, temperature 36.2 °C (97.1 °F), resp. rate 14, height 1.778 m (5' 10\"), weight 110.7 kg (244 lb), SpO2 96 %.    General:  Well nourished, well developed in NAD  Head is grossly normal.  Neck: Supple without JVD   Pulmonary:  Normal effort.   Cardiovascular: Regular rate  Extremities: no clubbing, cyanosis, or edema.  Psych: affect appropriate      Please note that this dictation was created using voice recognition software. I have made every reasonable attempt to correct obvious errors, but I expect that there are errors of grammar and possibly content that I did not discover before finalizing the note.    Assessment/Plan:  1. Lymphedema of both lower extremities   reviewed.  Patient counseled on risks, benefits and alternatives.    - oxyCODONE-acetaminophen (PERCOCET) 5-325 MG Tab; Take 1 Tab by mouth 2 times a day as needed for Moderate Pain or Severe Pain for up to 30 days. Take 1 or 2 tabs  Dispense: 60 Tab; Refill: 0  Continue other strategies for management of her lymphedema.    2. Intertrigo  - nystatin (MYCOSTATIN) 634509 UNIT/GM Cream topical cream; Apply 1 g to affected area(s) 2 times a day.  Dispense: 3 Tube; Refill: " 3    3. Screening for cardiovascular condition  - Lipid Profile; Future  - COMP METABOLIC PANEL; Future    Follow up 3-6 months sooner prn

## 2018-12-24 ENCOUNTER — HOSPITAL ENCOUNTER (OUTPATIENT)
Dept: LAB | Facility: MEDICAL CENTER | Age: 63
End: 2018-12-24
Attending: FAMILY MEDICINE
Payer: COMMERCIAL

## 2018-12-24 DIAGNOSIS — Z13.6 SCREENING FOR CARDIOVASCULAR CONDITION: ICD-10-CM

## 2018-12-24 LAB
ALBUMIN SERPL BCP-MCNC: 4 G/DL (ref 3.2–4.9)
ALBUMIN/GLOB SERPL: 1.6 G/DL
ALP SERPL-CCNC: 50 U/L (ref 30–99)
ALT SERPL-CCNC: 18 U/L (ref 2–50)
ANION GAP SERPL CALC-SCNC: 6 MMOL/L (ref 0–11.9)
AST SERPL-CCNC: 18 U/L (ref 12–45)
BILIRUB SERPL-MCNC: 1 MG/DL (ref 0.1–1.5)
BUN SERPL-MCNC: 20 MG/DL (ref 8–22)
CALCIUM SERPL-MCNC: 9.5 MG/DL (ref 8.5–10.5)
CHLORIDE SERPL-SCNC: 108 MMOL/L (ref 96–112)
CHOLEST SERPL-MCNC: 176 MG/DL (ref 100–199)
CO2 SERPL-SCNC: 27 MMOL/L (ref 20–33)
CREAT SERPL-MCNC: 0.69 MG/DL (ref 0.5–1.4)
FASTING STATUS PATIENT QL REPORTED: NORMAL
GLOBULIN SER CALC-MCNC: 2.5 G/DL (ref 1.9–3.5)
GLUCOSE SERPL-MCNC: 101 MG/DL (ref 65–99)
HDLC SERPL-MCNC: 67 MG/DL
LDLC SERPL CALC-MCNC: 97 MG/DL
POTASSIUM SERPL-SCNC: 4.3 MMOL/L (ref 3.6–5.5)
PROT SERPL-MCNC: 6.5 G/DL (ref 6–8.2)
SODIUM SERPL-SCNC: 141 MMOL/L (ref 135–145)
TRIGL SERPL-MCNC: 61 MG/DL (ref 0–149)

## 2018-12-24 PROCEDURE — 80053 COMPREHEN METABOLIC PANEL: CPT

## 2018-12-24 PROCEDURE — 80061 LIPID PANEL: CPT

## 2018-12-24 PROCEDURE — 36415 COLL VENOUS BLD VENIPUNCTURE: CPT

## 2019-08-01 ENCOUNTER — OFFICE VISIT (OUTPATIENT)
Dept: MEDICAL GROUP | Facility: PHYSICIAN GROUP | Age: 64
End: 2019-08-01
Payer: MEDICARE

## 2019-08-01 VITALS
HEIGHT: 70 IN | HEART RATE: 74 BPM | OXYGEN SATURATION: 97 % | BODY MASS INDEX: 34.7 KG/M2 | SYSTOLIC BLOOD PRESSURE: 122 MMHG | WEIGHT: 242.4 LBS | TEMPERATURE: 97.4 F | DIASTOLIC BLOOD PRESSURE: 80 MMHG | RESPIRATION RATE: 18 BRPM

## 2019-08-01 DIAGNOSIS — I89.0 LYMPHEDEMA OF BOTH LOWER EXTREMITIES: ICD-10-CM

## 2019-08-01 DIAGNOSIS — Z12.39 SCREENING FOR BREAST CANCER: ICD-10-CM

## 2019-08-01 PROCEDURE — 99214 OFFICE O/P EST MOD 30 MIN: CPT | Performed by: FAMILY MEDICINE

## 2019-08-01 RX ORDER — CELECOXIB 200 MG/1
1 CAPSULE ORAL DAILY
Refills: 3 | COMMUNITY
Start: 2019-05-05 | End: 2019-08-01 | Stop reason: SDUPTHER

## 2019-08-01 RX ORDER — OXYCODONE HYDROCHLORIDE AND ACETAMINOPHEN 5; 325 MG/1; MG/1
1 TABLET ORAL 2 TIMES DAILY PRN
Qty: 60 TAB | Refills: 0 | Status: SHIPPED | OUTPATIENT
Start: 2019-08-01 | End: 2019-11-19 | Stop reason: SDUPTHER

## 2019-08-01 RX ORDER — CELECOXIB 200 MG/1
200 CAPSULE ORAL DAILY
Qty: 180 CAP | Refills: 1 | Status: SHIPPED | OUTPATIENT
Start: 2019-08-01 | End: 2020-07-22

## 2019-08-01 SDOH — HEALTH STABILITY: MENTAL HEALTH: HOW MANY STANDARD DRINKS CONTAINING ALCOHOL DO YOU HAVE ON A TYPICAL DAY?: 1 OR 2

## 2019-08-01 SDOH — HEALTH STABILITY: MENTAL HEALTH: HOW OFTEN DO YOU HAVE A DRINK CONTAINING ALCOHOL?: MONTHLY OR LESS

## 2019-08-01 NOTE — PROGRESS NOTES
Chief Complaint   Patient presents with   • Chronic Opiate Therapy     percocet   • Results     [employer];scanned into media       HISTORY OF PRESENT ILLNESS: Patient is a 63 y.o. female established patient here today for the following concerns:    1. Lymphedema of both lower extremities  Here today for follow up on chronic opioid therapy for lymphedema pain.  She typically takes celebrex twice daily.  Recent labs reviewed in media from life insurance testing.  She reports a few times per week she will require the percocet.  Sometimes, when she overdoes it she will take it more often.  She is wondering if she might see [facility] as she was told they may be able to help with the lymphedema.  She is hoping for referral.     2. Screening for breast cancer  Due for mammogram.       Past Medical, Social, and Family history reviewed and updated in EPIC    Allergies:Patient has no known allergies.    Current Outpatient Medications   Medication Sig Dispense Refill   • oxyCODONE-acetaminophen (PERCOCET) 5-325 MG Tab Take 1 Tab by mouth 2 times a day as needed for Moderate Pain or Severe Pain for up to 30 days. Take 1 or 2 tabs 60 Tab 0   • celecoxib (CELEBREX) 200 MG Cap Take 1 Cap by mouth every day. 180 Cap 1   • nystatin (MYCOSTATIN) 628599 UNIT/GM Cream topical cream Apply 1 g to affected area(s) 2 times a day. 3 Tube 3   • Cholecalciferol (VITAMIN D3) 5000 UNITS Cap Take 1 Cap by mouth every day.     • FIBER PO Take  by mouth every day.     • diphenhydrAMINE (BENADRYL) 25 MG Tab Take 25 mg by mouth every 6 hours as needed for Sleep.       No current facility-administered medications for this visit.          ROS:  Review of Systems   Constitutional: Negative for fever, chills, weight loss and malaise/fatigue.   HENT: Negative for ear pain, nosebleeds, congestion, sore throat and neck pain.    Eyes: Negative for blurred vision.   Respiratory: Negative for cough, sputum production, shortness of breath and wheezing.   "  Cardiovascular: Negative for chest pain, palpitations,  and leg swelling.   Gastrointestinal: Negative for heartburn, nausea, vomiting, diarrhea and abdominal pain.   Genitourinary: Negative for dysuria, urgency and frequency.   Musculoskeletal: Negative for myalgias, back pain and joint pain.   Skin: Negative for rash and itching.   Neurological: Negative for dizziness, tingling, tremors, sensory change, focal weakness and headaches.   Endo/Heme/Allergies: Does not bruise/bleed easily.   Psychiatric/Behavioral: Negative for depression, anxiety, suicidal ideas, insomnia and memory loss.      Exam:  /80 (BP Location: Right arm, Patient Position: Sitting, BP Cuff Size: Adult)   Pulse 74   Temp 36.3 °C (97.4 °F)   Resp 18   Ht 1.778 m (5' 10\")   Wt 110 kg (242 lb 6.4 oz)   SpO2 97%     General:  Well nourished, well developed in NAD  Head is grossly normal.  Neck: Supple without JVD   Pulmonary:  Normal effort.   Cardiovascular: Regular rate  Extremities: no clubbing, cyanosis, or edema.  Psych: affect appropriate      Please note that this dictation was created using voice recognition software. I have made every reasonable attempt to correct obvious errors, but I expect that there are errors of grammar and possibly content that I did not discover before finalizing the note.    Assessment/Plan:  1. Lymphedema of both lower extremities  Renewed medication.  Continue with modifications, exercise, compression  - oxyCODONE-acetaminophen (PERCOCET) 5-325 MG Tab; Take 1 Tab by mouth 2 times a day as needed for Moderate Pain or Severe Pain for up to 30 days. Take 1 or 2 tabs  Dispense: 60 Tab; Refill: 0  - REFERRAL TO VASCULAR SURGERY    2. Screening for breast cancer  - MA-SCREENING MAMMO BILAT W/TOMOSYNTHESIS W/CAD; Future    3-6 month follow up        "

## 2019-08-28 ENCOUNTER — PATIENT OUTREACH (OUTPATIENT)
Dept: HEALTH INFORMATION MANAGEMENT | Facility: OTHER | Age: 64
End: 2019-08-28

## 2019-08-28 NOTE — PROGRESS NOTES
Outcome: Left Message    Please transfer to Patient Outreach Team at [phone] when patient returns call.    [facility] Verified: yes    Attempt # 1

## 2019-08-29 ENCOUNTER — TELEPHONE (OUTPATIENT)
Dept: HEALTH INFORMATION MANAGEMENT | Facility: OTHER | Age: 64
End: 2019-08-29

## 2019-08-29 DIAGNOSIS — M81.0 POSTMENOPAUSAL BONE LOSS: ICD-10-CM

## 2019-08-29 NOTE — TELEPHONE ENCOUNTER
1. Caller Name: Dorcas Michael                                             Call Back Number: [phone]        Patient approves a detailed voicemail message: N\A    2. SPECIFIC Action To Be Taken: Orders pending, please sign.    3. Diagnosis/Clinical Reason for Request: Bone Density    4. Specialty & Provider Name/Lab/Imaging Location: [facility]    5. Is appointment scheduled for requested order/referral: yes - 9/24/2019    Patient was not informed they will receive a return phone call from the office ONLY if there are any questions before processing their request. Advised to call back if they haven't received a call from the referral department in 5 days.

## 2019-08-29 NOTE — PROGRESS NOTES
Outcome: Introduction completed. Pt declined appt. As she just had an appt. With PCP (8/1/2019). Pt asked why [facility] is denied the prescription for Percocet 60 day supply. Pet pt, she can only get a weeks worth of medication and since its a narcotic she has to see PCP every time she needs a refill. I advised pt that we needed to call [facility] to find out and since it is a narcotic the medication might have restrictions. I told pt Isabelle would call her back with an answer next week on Wednesday or Thursday.       Please transfer to Patient Outreach Team at [phone]  when patient returns call.      [facility] Verified: yes    Attempt # 1

## 2019-09-24 ENCOUNTER — HOSPITAL ENCOUNTER (OUTPATIENT)
Dept: RADIOLOGY | Facility: MEDICAL CENTER | Age: 64
End: 2019-09-24
Attending: FAMILY MEDICINE
Payer: MEDICARE

## 2019-09-24 DIAGNOSIS — Z12.39 SCREENING FOR BREAST CANCER: ICD-10-CM

## 2019-09-24 DIAGNOSIS — M81.0 POSTMENOPAUSAL BONE LOSS: ICD-10-CM

## 2019-09-24 PROCEDURE — 77080 DXA BONE DENSITY AXIAL: CPT

## 2019-09-24 PROCEDURE — 77063 BREAST TOMOSYNTHESIS BI: CPT

## 2019-11-18 ENCOUNTER — TELEPHONE (OUTPATIENT)
Dept: MEDICAL GROUP | Facility: PHYSICIAN GROUP | Age: 64
End: 2019-11-18

## 2019-11-18 NOTE — TELEPHONE ENCOUNTER
Future Appointments       Provider Department Center    11/19/2019 1:40 PM Kenia Nick M.D. [facility]        ESTABLISHED PATIENT PRE-VISIT PLANNING     Patient was NOT contacted to complete PVP.      1.  Reviewed notes from the last few office visits within the medical group: Yes    2.  If any orders were placed at last visit or intended to be done for this visit (i.e. 6 mos follow-up), do we have Results/Consult Notes?        •  Labs - Labs were not ordered at last office visit.       •  Imaging - Imaging ordered, completed and results are in chart.       •  Referrals - No referrals were ordered at last office visit.    3. Is this appointment scheduled as a Hospital Follow-Up? No    4.  Immunizations were updated in [place] using WebIZ?: Yes       •  Web [employer] Recommendations: FLU, PREVNAR (PCV13) , TD, VARICELLA (Chicken Pox)  and SHINGRIX (Shingles)    5.  Patient is due for the following Health Maintenance Topics:   Health Maintenance Due   Topic Date Due   • Annual Wellness Visit  1955   • IMM ZOSTER VACCINES (2 of 3) 11/26/2014   • IMM PNEUMOCOCCAL VACCINE: 65+ Years (1 of 2 - PCV13) 08/15/2019   • IMM INFLUENZA (1) 09/01/2019   • PAP SMEAR  09/13/2019     6. Orders for overdue Health Maintenance topics pended in Pre-Charting? NO    7.  AHA (MDX) form printed for Provider? YES    8.  Patient was NOT informed to arrive 15 min prior to their scheduled appointment and bring in their medication bottles.

## 2019-11-19 ENCOUNTER — OFFICE VISIT (OUTPATIENT)
Dept: MEDICAL GROUP | Facility: PHYSICIAN GROUP | Age: 64
End: 2019-11-19
Payer: MEDICARE

## 2019-11-19 VITALS
TEMPERATURE: 96.9 F | BODY MASS INDEX: 35.02 KG/M2 | HEIGHT: 70 IN | HEART RATE: 76 BPM | SYSTOLIC BLOOD PRESSURE: 115 MMHG | RESPIRATION RATE: 18 BRPM | OXYGEN SATURATION: 98 % | DIASTOLIC BLOOD PRESSURE: 74 MMHG | WEIGHT: 244.6 LBS

## 2019-11-19 DIAGNOSIS — Z23 NEED FOR SHINGLES VACCINE: ICD-10-CM

## 2019-11-19 DIAGNOSIS — Z23 NEED FOR PNEUMOCOCCAL VACCINATION: ICD-10-CM

## 2019-11-19 DIAGNOSIS — M70.61 TROCHANTERIC BURSITIS OF RIGHT HIP: ICD-10-CM

## 2019-11-19 DIAGNOSIS — I89.0 LYMPHEDEMA OF BOTH LOWER EXTREMITIES: ICD-10-CM

## 2019-11-19 PROCEDURE — 90732 PPSV23 VACC 2 YRS+ SUBQ/IM: CPT | Performed by: FAMILY MEDICINE

## 2019-11-19 PROCEDURE — G0009 ADMIN PNEUMOCOCCAL VACCINE: HCPCS | Performed by: FAMILY MEDICINE

## 2019-11-19 PROCEDURE — 99214 OFFICE O/P EST MOD 30 MIN: CPT | Mod: 25 | Performed by: FAMILY MEDICINE

## 2019-11-19 RX ORDER — OXYCODONE HYDROCHLORIDE AND ACETAMINOPHEN 5; 325 MG/1; MG/1
1 TABLET ORAL 2 TIMES DAILY PRN
Qty: 60 TAB | Refills: 0 | Status: SHIPPED | OUTPATIENT
Start: 2019-11-19 | End: 2020-05-20 | Stop reason: SDUPTHER

## 2019-11-19 NOTE — PROGRESS NOTES
Chief Complaint   Patient presents with   • Results     DEXA       HISTORY OF PRESENT ILLNESS: Patient is a 64 y.o. female established patient here today for the following concerns:    1. Trochanteric bursitis of right hip  Reports that she has been having right hip pain.  It is worse when lying on that side.      2. Need for pneumococcal vaccination  3. Need for shingles vaccine  Due for shots.     4. Lymphedema of both lower extremities  Requests refill on the percocet for leg pain 2/2 to lymphedema.  She is working with Dr. Goldberg at [facility] and will be undergoing MRI to r/o vascular anomaly.  Had vein ligation surgery recently.        Bone density and Mammogram reviewed which are normal.   PAP due in 2  years.       Past Medical, Social, and Family history reviewed and updated in EPIC    Allergies:Patient has no known allergies.    Current Outpatient Medications   Medication Sig Dispense Refill   • Diclofenac Sodium (VOLTAREN) 1 % Gel Apply 4 g to skin as directed 3 times a day as needed. 1 Tube 1   • oxyCODONE-acetaminophen (PERCOCET) 5-325 MG Tab Take 1 Tab by mouth 2 times a day as needed for Moderate Pain or Severe Pain for up to 30 days. Take 1 or 2 tabs 60 Tab 0   • celecoxib (CELEBREX) 200 MG Cap Take 1 Cap by mouth every day. 180 Cap 1   • nystatin (MYCOSTATIN) 704267 UNIT/GM Cream topical cream Apply 1 g to affected area(s) 2 times a day. 3 Tube 3   • Cholecalciferol (VITAMIN D3) 5000 UNITS Cap Take 1 Cap by mouth every day.     • FIBER PO Take  by mouth every day.     • diphenhydrAMINE (BENADRYL) 25 MG Tab Take 25 mg by mouth every 6 hours as needed for Sleep.       No current facility-administered medications for this visit.          ROS:  Review of Systems   Constitutional: Negative for fever, chills, weight loss and malaise/fatigue.   HENT: Negative for ear pain, nosebleeds, congestion, sore throat and neck pain.    Eyes: Negative for blurred vision.   Respiratory: Negative for cough, sputum  "production, shortness of breath and wheezing.    Cardiovascular: Negative for chest pain, palpitations,  and leg swelling.   Gastrointestinal: Negative for heartburn, nausea, vomiting, diarrhea and abdominal pain.   Genitourinary: Negative for dysuria, urgency and frequency.   Musculoskeletal: Negative for myalgias, back pain and joint pain.   Skin: Negative for rash and itching.   Neurological: Negative for dizziness, tingling, tremors, sensory change, focal weakness and headaches.   Endo/Heme/Allergies: Does not bruise/bleed easily.   Psychiatric/Behavioral: Negative for depression, anxiety, suicidal ideas, insomnia and memory loss.      Exam:  /74 (BP Location: Left arm, Patient Position: Sitting, BP Cuff Size: Adult)   Pulse 76   Temp 36.1 °C (96.9 °F) (Temporal)   Resp 18   Ht 1.778 m (5' 10\")   Wt 110.9 kg (244 lb 9.6 oz)   SpO2 98%     General:  Well nourished, well developed in NAD  Head is grossly normal.  Neck: Supple without JVD   Pulmonary:  Normal effort.   Cardiovascular: Regular rate  Extremities: no clubbing, cyanosis, or Left lower ext edema.  Psych: affect appropriate      Please note that this dictation was created using voice recognition software. I have made every reasonable attempt to correct obvious errors, but I expect that there are errors of grammar and possibly content that I did not discover before finalizing the note.    Assessment/Plan:  1. Trochanteric bursitis of right hip  - Diclofenac Sodium (VOLTAREN) 1 % Gel; Apply 4 g to skin as directed 3 times a day as needed.  Dispense: 1 Tube; Refill: 1    2. Need for pneumococcal vaccination  - Pneumococal Polysaccharide Vaccine 23-Valent =>1YO SQ/IM    3. Need for shingles vaccine  - Shingles Vaccine (Shingrix) - unfortunately, out of stock at this time will defer.     4. Lymphedema of both lower extremities  - oxyCODONE-acetaminophen (PERCOCET) 5-325 MG Tab; Take 1 Tab by mouth 2 times a day as needed for Moderate Pain or " Severe Pain for up to 30 days. Take 1 or 2 tabs  Dispense: 60 Tab; Refill: 0

## 2019-11-22 ENCOUNTER — HOSPITAL ENCOUNTER (OUTPATIENT)
Dept: LAB | Facility: MEDICAL CENTER | Age: 64
End: 2019-11-22
Attending: NURSE PRACTITIONER
Payer: MEDICARE

## 2019-11-22 LAB
ALBUMIN SERPL BCP-MCNC: 3.9 G/DL (ref 3.2–4.9)
ALBUMIN/GLOB SERPL: 1.6 G/DL
ALP SERPL-CCNC: 51 U/L (ref 30–99)
ALT SERPL-CCNC: 21 U/L (ref 2–50)
ANION GAP SERPL CALC-SCNC: 8 MMOL/L (ref 0–11.9)
AST SERPL-CCNC: 19 U/L (ref 12–45)
BILIRUB SERPL-MCNC: 1.3 MG/DL (ref 0.1–1.5)
BUN SERPL-MCNC: 18 MG/DL (ref 8–22)
CALCIUM SERPL-MCNC: 9.2 MG/DL (ref 8.5–10.5)
CHLORIDE SERPL-SCNC: 106 MMOL/L (ref 96–112)
CO2 SERPL-SCNC: 24 MMOL/L (ref 20–33)
CREAT SERPL-MCNC: 0.66 MG/DL (ref 0.5–1.4)
GLOBULIN SER CALC-MCNC: 2.5 G/DL (ref 1.9–3.5)
GLUCOSE SERPL-MCNC: 86 MG/DL (ref 65–99)
POTASSIUM SERPL-SCNC: 3.9 MMOL/L (ref 3.6–5.5)
PROT SERPL-MCNC: 6.4 G/DL (ref 6–8.2)
SODIUM SERPL-SCNC: 138 MMOL/L (ref 135–145)

## 2019-11-22 PROCEDURE — 36415 COLL VENOUS BLD VENIPUNCTURE: CPT

## 2019-11-22 PROCEDURE — 80053 COMPREHEN METABOLIC PANEL: CPT

## 2019-11-30 ENCOUNTER — HOSPITAL ENCOUNTER (OUTPATIENT)
Dept: RADIOLOGY | Facility: MEDICAL CENTER | Age: 64
End: 2019-11-30
Attending: RADIOLOGY
Payer: MEDICARE

## 2019-11-30 DIAGNOSIS — I87.1 COMPRESSION, VEIN: ICD-10-CM

## 2019-11-30 PROCEDURE — A9576 INJ PROHANCE MULTIPACK: HCPCS | Performed by: FAMILY MEDICINE

## 2019-11-30 PROCEDURE — 700117 HCHG RX CONTRAST REV CODE 255: Performed by: FAMILY MEDICINE

## 2019-11-30 PROCEDURE — C8920 MRA W/O FOL W/CONT, PELVIS: HCPCS

## 2019-11-30 RX ADMIN — GADOTERIDOL 20 ML: 279.3 INJECTION, SOLUTION INTRAVENOUS at 14:30

## 2020-02-18 ENCOUNTER — OFFICE VISIT (OUTPATIENT)
Dept: URGENT CARE | Facility: CLINIC | Age: 65
End: 2020-02-18
Payer: MEDICARE

## 2020-02-18 VITALS
HEART RATE: 70 BPM | OXYGEN SATURATION: 95 % | HEIGHT: 70 IN | BODY MASS INDEX: 34.36 KG/M2 | RESPIRATION RATE: 16 BRPM | WEIGHT: 240 LBS | TEMPERATURE: 98.8 F | SYSTOLIC BLOOD PRESSURE: 124 MMHG | DIASTOLIC BLOOD PRESSURE: 76 MMHG

## 2020-02-18 DIAGNOSIS — J06.9 URI WITH COUGH AND CONGESTION: ICD-10-CM

## 2020-02-18 DIAGNOSIS — J40 BRONCHITIS: Primary | ICD-10-CM

## 2020-02-18 PROCEDURE — 99214 OFFICE O/P EST MOD 30 MIN: CPT | Performed by: PHYSICIAN ASSISTANT

## 2020-02-18 RX ORDER — METHYLPREDNISOLONE 4 MG/1
TABLET ORAL
Qty: 21 TAB | Refills: 0 | Status: SHIPPED | OUTPATIENT
Start: 2020-02-18 | End: 2020-05-20

## 2020-02-18 RX ORDER — PROMETHAZINE HYDROCHLORIDE AND CODEINE PHOSPHATE 6.25; 1 MG/5ML; MG/5ML
5 SYRUP ORAL 4 TIMES DAILY PRN
Qty: 120 ML | Refills: 0 | Status: SHIPPED | OUTPATIENT
Start: 2020-02-18 | End: 2020-02-25

## 2020-02-18 RX ORDER — DOXYCYCLINE HYCLATE 100 MG
100 TABLET ORAL 2 TIMES DAILY
Qty: 14 TAB | Refills: 0 | Status: SHIPPED | OUTPATIENT
Start: 2020-02-18 | End: 2020-02-25

## 2020-02-18 NOTE — PATIENT INSTRUCTIONS
Acute Bronchitis, Adult  Acute bronchitis is when air tubes (bronchi) in the lungs suddenly get swollen. The condition can make it hard to breathe. It can also cause these symptoms:  · A cough.  · Coughing up clear, yellow, or green mucus.  · Wheezing.  · Chest congestion.  · Shortness of breath.  · A fever.  · Body aches.  · Chills.  · A sore throat.  Follow these instructions at home:  Medicines  · Take over-the-counter and prescription medicines only as told by your doctor.  · If you were prescribed an antibiotic medicine, take it as told by your doctor. Do not stop taking the antibiotic even if you start to feel better.  General instructions  · Rest.  · Drink enough fluids to keep your pee (urine) clear or pale yellow.  · Avoid smoking and secondhand smoke. If you smoke and you need help quitting, ask your doctor. Quitting will help your lungs heal faster.  · Use an inhaler, cool mist vaporizer, or humidifier as told by your doctor.  · Keep all follow-up visits as told by your doctor. This is important.  How is this prevented?  To lower your risk of getting this condition again:  · Wash your hands often with soap and water. If you cannot use soap and water, use hand .  · Avoid contact with people who have cold symptoms.  · Try not to touch your hands to your mouth, nose, or eyes.  · Make sure to get the flu shot every year.  Contact a doctor if:  · Your symptoms do not get better in 2 weeks.  Get help right away if:  · You cough up blood.  · You have chest pain.  · You have very bad shortness of breath.  · You become dehydrated.  · You faint (pass out) or keep feeling like you are going to pass out.  · You keep throwing up (vomiting).  · You have a very bad headache.  · Your fever or chills gets worse.  This information is not intended to replace advice given to you by your health care provider. Make sure you discuss any questions you have with your health care provider.  Document Released: 06/05/2009  Document Revised: 07/26/2017 Document Reviewed: 06/07/2017  [employer] Interactive Patient Education © 2017 [employer]

## 2020-02-18 NOTE — PROGRESS NOTES
Subjective:      Pt is a 64 y.o. female who presents with Cough            HPI  This is a new problem. PT presents to  clinic today complaining of sore throat,  pressure in ears, cough, fatigue, runny nose, wheezing and SOB. PT denies CP, NVD, abdominal pain, joint pain. PT states these symptoms began around 7-10 days ago. PT states the pain is a 7/10 with coughing fits, aching in nature and worse at night. Pt has not taken any RX medications for this condition. The pt's medication list, problem list, and allergies have been evaluated and reviewed during today's visit.    PMH:  Past Medical History:   Diagnosis Date   • Arthritis     both hands   • Lymphedema 1995    bilateral legs   • Pain     left knee 4/10   • Pain     hands, feet   • Pain     left hand       PSH:  Past Surgical History:   Procedure Laterality Date   • FINGER ARTHROPLASTY Left 8/22/2017    Procedure: FINGER ARTHROPLASTY - CARPAL METACARPAL;  Surgeon: Magnus Anderson M.D.;  Location: [facility];  Service:    • TENDON TRANSFER Left 8/22/2017    Procedure: TENDON TRANSFER - FLEXI CARPI RADIALIS;  Surgeon: Magnus Anderson M.D.;  Location: [facility];  Service:    • CAPSULOTOMY  8/22/2017    Procedure: CAPSULOTOMY - METACARPAL PHALANGEAL JOINT CAPSULODESIS;  Surgeon: Magnus Anderson M.D.;  Location: [facility];  Service:    • FINGER ARTHROPLASTY Right 9/20/2016    Procedure: FINGER ARTHROPLASTY - CARPAL METACARPAL;  Surgeon: Magnus Anderson M.D.;  Location: [facility];  Service:    • TENDON TRANSFER Right 9/20/2016    Procedure: TENDON TRANSFER - FLEXI CARPI RADIALIS;  Surgeon: Magnus Anderson M.D.;  Location: [facility];  Service:    • PIN INSERTION Right 9/20/2016    Procedure: PIN INSERTION - METACARPAL PHALANGEAL JOINT;  Surgeon: Magnus Anderson M.D.;  Location: [facility];  Service:    • KNEE MANIPULATION  3/1/2010    Performed by OSGOOD, PATRICK J at SURGERY  [facility] ORS   • KNEE ARTHROPLASTY TOTAL  11/3/2009    Performed by OSGOOD, PATRICK J at SURGERY [facility] ORS   • ACHILLES TENDON REP Right 5/2005    right   • KNEE ARTHROPLASTY TOTAL Right 12/2003    right   • KNEE ARTHROSCOPY Right 5/1998    right   • RECTOCELE REPAIR  6/1990    repair cystocele   • KNEE ARTHROTOMY Left 1973    left lateral meniscectomy   • KNEE ARTHROTOMY Right 1971    right lateral meniscectomy       Fam Hx:    family history includes GI Disease in an other family member; Heart Disease in an other family member; Other in her mother; Thyroid in her daughter and sister.  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    Social Needs   • Financial resource strain: Not on file   • Food insecurity     Worry: Not on file     Inability: Not on file   • Transportation needs     Medical: Not on file     Non-medical: Not on file   Tobacco Use   • Smoking status: Never Smoker   • Smokeless tobacco: Never Used   Substance and Sexual Activity   • Alcohol use: Yes     Alcohol/week: 8.4 oz     Types: 14 Standard drinks or equivalent per week     Frequency: Monthly or less     Drinks per session: 1 or 2     Comment: 2 per day   • Drug use: No   • Sexual activity: Yes     Partners: Male   Lifestyle   • Physical activity     Days per week: Not on file     Minutes per session: Not on file   • Stress: Not on file   Relationships   • Social connections     Talks on phone: Not on file     Gets together: Not on file     Attends Sabianist service: Not on file     Active member of club or organization: Not on file      Attends meetings of clubs or organizations: Not on file     Relationship status: Not on file   • Intimate partner violence     Fear of current or ex partner: Not on file     Emotionally abused: Not on file     Physically abused: Not on file     Forced sexual activity: Not on file   Other Topics Concern   • Not on file   Social History Narrative   • Not on file         Medications:    Current Outpatient Medications:   •  doxycycline (VIBRAMYCIN) 100 MG Tab, Take 1 Tab by mouth 2 times a day for 7 days., Disp: 14 Tab, Rfl: 0  •  methylPREDNISolone (MEDROL DOSEPAK) 4 MG Tablet Therapy Pack, Follow schedule on package instructions., Disp: 21 Tab, Rfl: 0  •  promethazine-codeine (PHENERGAN-CODEINE) 6.25-10 MG/5ML Syrup, Take 5 mL by mouth 4 times a day as needed for up to 7 days., Disp: 120 mL, Rfl: 0  •  celecoxib (CELEBREX) 200 MG Cap, Take 1 Cap by mouth every day., Disp: 180 Cap, Rfl: 1  •  nystatin (MYCOSTATIN) 917890 UNIT/GM Cream topical cream, Apply 1 g to affected area(s) 2 times a day., Disp: 3 Tube, Rfl: 3  •  Cholecalciferol (VITAMIN D3) 5000 UNITS Cap, Take 1 Cap by mouth every day., Disp: , Rfl:   •  FIBER PO, Take  by mouth every day., Disp: , Rfl:   •  diphenhydrAMINE (BENADRYL) 25 MG Tab, Take 25 mg by mouth every 6 hours as needed for Sleep., Disp: , Rfl:       Allergies:  Patient has no known allergies.    ROS  Review of Systems   Constitutional: Positive for malaise/fatigue. Negative for fever and diaphoresis.   HENT: Positive for congestion and sore throat. Negative for ear discharge, hearing loss, nosebleeds and tinnitus.    Eyes: Negative for blurred vision, double vision and photophobia.   Respiratory: Positive for cough, sputum production, shortness of breath and wheezing. Negative for hemoptysis.    Cardiovascular: Negative for chest pain and palpitations.   Gastrointestinal: Negative for nausea, vomiting, abdominal pain, diarrhea and constipation.   Genitourinary: Negative for dysuria and flank  "pain.   Musculoskeletal: Negative for joint pain and myalgias.   Skin: Negative for itching and rash.   Neurological:  Negative for dizziness, tingling and weakness.   Endo/Heme/Allergies: Does not bruise/bleed easily.   Psychiatric/Behavioral: Negative for depression. The patient is not nervous/anxious.             Objective:     Ht 1.778 m (5' 10\")   Wt 108.9 kg (240 lb)   BMI 34.44 kg/m²      Physical Exam       Physical Exam   Constitutional: PT is oriented to person, place, and time. PT appears well-developed and well-nourished. No distress.   HENT:   Head: Normocephalic and atraumatic.   Right Ear: Hearing, tympanic membrane, external ear and ear canal normal.   Left Ear: Hearing, tympanic membrane, external ear and ear canal normal.   Nose: Mucosal edema, rhinorrhea and sinus tenderness present. Right sinus exhibits frontal sinus tenderness. Left sinus exhibits frontal sinus tenderness.   Mouth/Throat: Uvula is midline. Mucous membranes are pale. Posterior oropharyngeal edema and posterior oropharyngeal erythema present. No oropharyngeal exudate.   Eyes: Conjunctivae normal and EOM are normal. Pupils are equal, round, and reactive to light. Right eye exhibits no discharge. Left eye exhibits no discharge.   Neck: Normal range of motion. Neck supple. No thyromegaly present.   Cardiovascular: Normal rate, regular rhythm, normal heart sounds and intact distal pulses.  Exam reveals no gallop and no friction rub.    No murmur heard.  Pulmonary/Chest: Effort normal. No respiratory distress. PT has wheezes. PT has no rales. PT exhibits tenderness.   Abdominal: Soft. Bowel sounds are normal. PT exhibits no distension and no mass. There is no tenderness. There is no rebound and no guarding.   Musculoskeletal: Normal range of motion. PT exhibits no edema and no tenderness.   Lymphadenopathy:     PT has no cervical adenopathy.   Neurological: Pt is alert and oriented to person, place, and time. Pt has normal reflexes. " No cranial nerve deficit.   Skin: Skin is warm and dry. No rash noted. No erythema.   Psychiatric: PT has a normal mood and affect. Pt behavior is normal. Judgment and thought content normal.        Assessment/Plan:       1. Bronchitis    - doxycycline (VIBRAMYCIN) 100 MG Tab; Take 1 Tab by mouth 2 times a day for 7 days.  Dispense: 14 Tab; Refill: 0  - methylPREDNISolone (MEDROL DOSEPAK) 4 MG Tablet Therapy Pack; Follow schedule on package instructions.  Dispense: 21 Tab; Refill: 0    2. URI with cough and congestion    - methylPREDNISolone (MEDROL DOSEPAK) 4 MG Tablet Therapy Pack; Follow schedule on package instructions.  Dispense: 21 Tab; Refill: 0  - promethazine-codeine (PHENERGAN-CODEINE) 6.25-10 MG/5ML Syrup; Take 5 mL by mouth 4 times a day as needed for up to 7 days.  Dispense: 120 mL; Refill: 0      Concern for worsening symptoms of URI which shortly could transition to pneumonia and worsening sinus congestion and infection with powerful cough keeping pt up at night as they must sleep upright to avoid coughing fits.  Diff DX: Bronchitis, Sinusitis, Pneumonia, Influenza, Viral URI, Allergies  Rest, fluids encouraged.  OTC decongestant for congestion/cough  AVS with medical info given.  Pt was in full understanding and agreement with the plan.  Differential diagnosis, natural history, supportive care, and indications for immediate follow-up discussed. All questions answered. Patient agrees with the plan of care.  Follow-up as needed if symptoms worsen or fail to improve to PCP, Urgent care or Emergency Room.

## 2020-04-28 DIAGNOSIS — L30.4 INTERTRIGO: ICD-10-CM

## 2020-04-29 RX ORDER — NYSTATIN 100000 U/G
CREAM TOPICAL
Qty: 90 G | Refills: 2 | Status: SHIPPED | OUTPATIENT
Start: 2020-04-29 | End: 2021-07-06 | Stop reason: SDUPTHER

## 2020-04-29 NOTE — TELEPHONE ENCOUNTER
Was the patient seen in the last year in this department? Yes    Does patient have an active prescription for medications requested? No     Received Request Via: Pharmacy      Pt met protocol?: Yes, OV 11/19

## 2020-05-19 ENCOUNTER — TELEPHONE (OUTPATIENT)
Dept: MEDICAL GROUP | Facility: PHYSICIAN GROUP | Age: 65
End: 2020-05-19

## 2020-05-19 NOTE — TELEPHONE ENCOUNTER
ESTABLISHED PATIENT PRE-VISIT PLANNING     Patient was NOT contacted to complete PVP.    1.  Reviewed notes from the last few office visits within the medical group: Yes    2.  If any orders were placed at last visit or intended to be done for this visit (i.e. 6 mos follow-up), do we have Results/Consult Notes?        •  Labs - Labs were not ordered at last office visit.       •  Imaging - Imaging was not ordered at last office visit.       •  Referrals - No referrals were ordered at last office visit.    3. Is this appointment scheduled as a Hospital Follow-Up? No    4.  Immunizations were updated in [facility] using WebIZ?: Yes       •  Web Iz Recommendations: FLU, TD, VARICELLA (Chicken Pox)  and SHINGRIX (Shingles)    5.  Patient is due for the following Health Maintenance Topics:   Health Maintenance Due   Topic Date Due   • Annual Wellness Visit  1955   • IMM ZOSTER VACCINES (2 of 3) 11/26/2014     6. Orders for overdue Health Maintenance topics pended in Pre-Charting? NO    7.  AHA (MDX) form printed for Provider? No, already completed    8.  Patient was NOT informed to arrive 15 min prior to their scheduled appointment and bring in their medication bottles.

## 2020-05-20 ENCOUNTER — OFFICE VISIT (OUTPATIENT)
Dept: MEDICAL GROUP | Facility: PHYSICIAN GROUP | Age: 65
End: 2020-05-20
Payer: MEDICARE

## 2020-05-20 ENCOUNTER — HOSPITAL ENCOUNTER (OUTPATIENT)
Dept: LAB | Facility: MEDICAL CENTER | Age: 65
End: 2020-05-20
Attending: FAMILY MEDICINE
Payer: MEDICARE

## 2020-05-20 VITALS
RESPIRATION RATE: 16 BRPM | DIASTOLIC BLOOD PRESSURE: 78 MMHG | TEMPERATURE: 97.6 F | SYSTOLIC BLOOD PRESSURE: 122 MMHG | HEART RATE: 72 BPM | BODY MASS INDEX: 34.59 KG/M2 | OXYGEN SATURATION: 96 % | WEIGHT: 241.6 LBS | HEIGHT: 70 IN

## 2020-05-20 DIAGNOSIS — Z13.6 SCREENING FOR CARDIOVASCULAR CONDITION: ICD-10-CM

## 2020-05-20 DIAGNOSIS — M76.62 ACHILLES TENDINITIS OF LEFT LOWER EXTREMITY: ICD-10-CM

## 2020-05-20 DIAGNOSIS — I89.0 LYMPHEDEMA OF BOTH LOWER EXTREMITIES: ICD-10-CM

## 2020-05-20 DIAGNOSIS — K42.9 UMBILICAL HERNIA WITHOUT OBSTRUCTION AND WITHOUT GANGRENE: ICD-10-CM

## 2020-05-20 LAB
ALBUMIN SERPL BCP-MCNC: 4.3 G/DL (ref 3.2–4.9)
ALBUMIN/GLOB SERPL: 1.8 G/DL
ALP SERPL-CCNC: 60 U/L (ref 30–99)
ALT SERPL-CCNC: 26 U/L (ref 2–50)
ANION GAP SERPL CALC-SCNC: 10 MMOL/L (ref 7–16)
AST SERPL-CCNC: 24 U/L (ref 12–45)
BILIRUB SERPL-MCNC: 1 MG/DL (ref 0.1–1.5)
BUN SERPL-MCNC: 19 MG/DL (ref 8–22)
CALCIUM SERPL-MCNC: 9.6 MG/DL (ref 8.5–10.5)
CHLORIDE SERPL-SCNC: 107 MMOL/L (ref 96–112)
CHOLEST SERPL-MCNC: 210 MG/DL (ref 100–199)
CO2 SERPL-SCNC: 24 MMOL/L (ref 20–33)
CREAT SERPL-MCNC: 0.72 MG/DL (ref 0.5–1.4)
FASTING STATUS PATIENT QL REPORTED: NORMAL
GLOBULIN SER CALC-MCNC: 2.4 G/DL (ref 1.9–3.5)
GLUCOSE SERPL-MCNC: 104 MG/DL (ref 65–99)
HDLC SERPL-MCNC: 83 MG/DL
LDLC SERPL CALC-MCNC: 117 MG/DL
POTASSIUM SERPL-SCNC: 4.4 MMOL/L (ref 3.6–5.5)
PROT SERPL-MCNC: 6.7 G/DL (ref 6–8.2)
SODIUM SERPL-SCNC: 141 MMOL/L (ref 135–145)
TRIGL SERPL-MCNC: 52 MG/DL (ref 0–149)

## 2020-05-20 PROCEDURE — 80061 LIPID PANEL: CPT

## 2020-05-20 PROCEDURE — 36415 COLL VENOUS BLD VENIPUNCTURE: CPT

## 2020-05-20 PROCEDURE — 99214 OFFICE O/P EST MOD 30 MIN: CPT | Performed by: FAMILY MEDICINE

## 2020-05-20 PROCEDURE — 80053 COMPREHEN METABOLIC PANEL: CPT

## 2020-05-20 RX ORDER — OXYCODONE HYDROCHLORIDE AND ACETAMINOPHEN 5; 325 MG/1; MG/1
1 TABLET ORAL 2 TIMES DAILY PRN
Qty: 60 TAB | Refills: 0 | Status: SHIPPED | OUTPATIENT
Start: 2020-05-20 | End: 2020-11-24 | Stop reason: SDUPTHER

## 2020-05-20 ASSESSMENT — PATIENT HEALTH QUESTIONNAIRE - PHQ9: CLINICAL INTERPRETATION OF PHQ2 SCORE: 0

## 2020-05-20 NOTE — PROGRESS NOTES
Chief Complaint   Patient presents with   • Foot Pain     lft foot pain   • Arm Pain     rt arm pain   • Hernia     fv umbilical hernia   • Chronic Opiate Therapy       HISTORY OF PRESENT ILLNESS: Patient is a 64 y.o. female established patient here today for the following concerns:    1. Umbilical hernia without obstruction and without gangrene  Here today for follow up.  Reports that while being worked up for lymphedema, she had imaging which revealed Umbilical hernia.  She reports no significant pain, discomfort.  In fact not sure she knew about it before the imaging.  Wondering if it needs to be fixed.     2. Achilles tendinitis of left lower extremity  Reports a few weeks of left achilles pain that started after hiking.  Reports that she is unable to wear proper footwear due to the lymphedema in the left leg and therefore strained it.  She has been stretching, icing and trying  To walk on level surface daily.  She has not tried voltaren.  Has not had any weakness and not been on any fluoroquinolones.     3. Lymphedema of both lower extremities  Needs refill on percocet for breakthrough pain while active.  Typically only needs these when Celebrex is not enough.      4. Screening for cardiovascular condition  Due for updated labs.       Past Medical, Social, and Family history reviewed and updated in EPIC    Allergies:Patient has no known allergies.    Current Outpatient Medications   Medication Sig Dispense Refill   • B Complex Vitamins (VITAMIN B COMPLEX PO)      • oxyCODONE-acetaminophen (PERCOCET) 5-325 MG Tab Take 1 Tab by mouth 2 times a day as needed for Moderate Pain or Severe Pain for up to 30 days. Take 1 or 2 tabs 60 Tab 0   • nystatin (MYCOSTATIN) 423764 UNIT/GM Cream topical cream APPLY 1 GM TO AFFECTED AREA(S) 2 TIMES A DAY. 90 g 2   • celecoxib (CELEBREX) 200 MG Cap Take 1 Cap by mouth every day. 180 Cap 1   • Cholecalciferol (VITAMIN D3) 5000 UNITS Cap Take 1 Cap by mouth every day.     • FIBER PO  "Take  by mouth every day.     • diphenhydrAMINE (BENADRYL) 25 MG Tab Take 25 mg by mouth every 6 hours as needed for Sleep.     • CELEBREX 200 MG Cap        No current facility-administered medications for this visit.          ROS:  Review of Systems   Constitutional: Negative for fever, chills, weight loss and malaise/fatigue.   HENT: Negative for ear pain, nosebleeds, congestion, sore throat and neck pain.    Eyes: Negative for blurred vision.   Respiratory: Negative for cough, sputum production, shortness of breath and wheezing.    Cardiovascular: Negative for chest pain, palpitations,  and leg swelling.   Gastrointestinal: Negative for heartburn, nausea, vomiting, diarrhea and abdominal pain.   Genitourinary: Negative for dysuria, urgency and frequency.   Musculoskeletal: Negative for myalgias, back pain and joint pain.   Skin: Negative for rash and itching.   Neurological: Negative for dizziness, tingling, tremors, sensory change, focal weakness and headaches.   Endo/Heme/Allergies: Does not bruise/bleed easily.   Psychiatric/Behavioral: Negative for depression, anxiety, suicidal ideas, insomnia and memory loss.      Exam:  /78   Pulse 72   Temp 36.4 °C (97.6 °F)   Resp 16   Ht 1.765 m (5' 9.5\")   Wt 109.6 kg (241 lb 9.6 oz)   SpO2 96%     General:  Well nourished, well developed in NAD  Head is grossly normal.  Neck: Supple without JVD   Pulmonary:  Normal effort.   Cardiovascular: Regular rate  Extremities: no clubbing, cyanosis, left non-pitting edema.  Psych: affect appropriate      Please note that this dictation was created using voice recognition software. I have made every reasonable attempt to correct obvious errors, but I expect that there are errors of grammar and possibly content that I did not discover before finalizing the note.    Assessment/Plan:  1. Umbilical hernia without obstruction and without gangrene  Observation recommended    2. Achilles tendinitis of left lower " extremity  Trial of voltaren gel, ice after activity avoid excessive incline while walking.  Gentle stretches.  If not improving may consider PT.     3. Lymphedema of both lower extremities  Continue with regular exercise, compression and when needed   - oxyCODONE-acetaminophen (PERCOCET) 5-325 MG Tab; Take 1 Tab by mouth 2 times a day as needed for Moderate Pain or Severe Pain for up to 30 days. Take 1 or 2 tabs  Dispense: 60 Tab; Refill: 0    4. Screening for cardiovascular condition  - Comp Metabolic Panel; Future  - Lipid Profile; Future            Annual Health Assessment Questions:    1.  Are you currently engaging in any exercise or physical activity? Yes    2.  How would you describe your mood or emotional well-being today? excellent    3.  Have you had any falls in the last year? Yes    4.  Have you noticed any problems with your balance or had difficulty walking? No    5.  In the last six months have you experienced any leakage of urine? No    6. DPA/Advanced Directive: Patient does not have an Advanced Directive.  A packet and workshop information was given on Advanced Directives.

## 2020-07-22 RX ORDER — CELECOXIB 200 MG/1
CAPSULE ORAL
Qty: 90 CAP | Refills: 3 | Status: SHIPPED | OUTPATIENT
Start: 2020-07-22 | End: 2021-08-23 | Stop reason: SDUPTHER

## 2020-11-09 ENCOUNTER — APPOINTMENT (OUTPATIENT)
Dept: RADIOLOGY | Facility: MEDICAL CENTER | Age: 65
End: 2020-11-09
Attending: FAMILY MEDICINE
Payer: MEDICARE

## 2020-11-24 ENCOUNTER — TELEMEDICINE (OUTPATIENT)
Dept: MEDICAL GROUP | Facility: PHYSICIAN GROUP | Age: 65
End: 2020-11-24
Payer: MEDICARE

## 2020-11-24 VITALS — WEIGHT: 243 LBS | BODY MASS INDEX: 34.79 KG/M2 | HEIGHT: 70 IN

## 2020-11-24 DIAGNOSIS — I89.0 LYMPHEDEMA OF BOTH LOWER EXTREMITIES: ICD-10-CM

## 2020-11-24 DIAGNOSIS — K42.9 UMBILICAL HERNIA WITHOUT OBSTRUCTION AND WITHOUT GANGRENE: ICD-10-CM

## 2020-11-24 DIAGNOSIS — K63.5 POLYP OF COLON, UNSPECIFIED PART OF COLON, UNSPECIFIED TYPE: ICD-10-CM

## 2020-11-24 DIAGNOSIS — Z12.11 SCREENING FOR COLON CANCER: ICD-10-CM

## 2020-11-24 PROCEDURE — 99214 OFFICE O/P EST MOD 30 MIN: CPT | Mod: 95,CR | Performed by: FAMILY MEDICINE

## 2020-11-24 RX ORDER — OXYCODONE HYDROCHLORIDE AND ACETAMINOPHEN 5; 325 MG/1; MG/1
1 TABLET ORAL 2 TIMES DAILY PRN
Qty: 60 TAB | Refills: 0 | Status: SHIPPED | OUTPATIENT
Start: 2020-11-24 | End: 2020-12-24

## 2020-11-24 SDOH — HEALTH STABILITY: MENTAL HEALTH: HOW OFTEN DO YOU HAVE A DRINK CONTAINING ALCOHOL?: 4 OR MORE TIMES A WEEK

## 2020-11-24 NOTE — PROGRESS NOTES
Chief Complaint   Patient presents with   • Chronic Opiate Therapy   • Hernia       HISTORY OF PRESENT ILLNESS: Patient is a 65 y.o. female established patient here today for the following concerns:    This encounter was conducted via Zoom .   Verbal consent was obtained. Patient's identity was verified.      1. Lymphedema of both lower extremities  Here today on zoom conference to discuss intermittent use of percocet for chronic leg pain associated with lymphedema.  She typically only uses this if she is going to be on her feet or has overdone it.  Does not use this medication on a daily basis.     2. Umbilical hernia without obstruction and without gangrene  In addition, she is hoping for referral to general surgery for consultation about the umbilical hernia. No changes in bowel or bladder.  She is able to easily reduce it.     3. Screening for colon cancer  . Polyp of colon, unspecified part of colon, unspecified type  Due for colonoscopy this year.       Past Medical, Social, and Family history reviewed and updated in EPIC    Allergies:Patient has no known allergies.    Current Outpatient Medications   Medication Sig Dispense Refill   • oxyCODONE-acetaminophen (PERCOCET) 5-325 MG Tab Take 1 Tab by mouth 2 times a day as needed for Moderate Pain or Severe Pain for up to 30 days. Take 1 or 2 tabs 60 Tab 0   • celecoxib (CELEBREX) 200 MG Cap TAKE 1 CAPSULE BY MOUTH EVERY DAY 90 Cap 3   • B Complex Vitamins (VITAMIN B COMPLEX PO)      • nystatin (MYCOSTATIN) 648754 UNIT/GM Cream topical cream APPLY 1 GM TO AFFECTED AREA(S) 2 TIMES A DAY. 90 g 2   • Cholecalciferol (VITAMIN D3) 5000 UNITS Cap Take 1 Cap by mouth every day.     • FIBER PO Take  by mouth every day.     • diphenhydrAMINE (BENADRYL) 25 MG Tab Take 25 mg by mouth every 6 hours as needed for Sleep.     • CELEBREX 200 MG Cap        No current facility-administered medications for this visit.          ROS:  Review of Systems   Constitutional: Negative for  "fever, chills, weight loss and malaise/fatigue.   HENT: Negative for ear pain, nosebleeds, congestion, sore throat and neck pain.    Eyes: Negative for blurred vision.   Respiratory: Negative for cough, sputum production, shortness of breath and wheezing.    Cardiovascular: Negative for chest pain, palpitations,  and leg swelling.   Gastrointestinal: Negative for heartburn, nausea, vomiting, diarrhea and abdominal pain.   Genitourinary: Negative for dysuria, urgency and frequency.   Musculoskeletal: Negative for myalgias, back pain and joint pain.   Skin: Negative for rash and itching.   Neurological: Negative for dizziness, tingling, tremors, sensory change, focal weakness and headaches.   Endo/Heme/Allergies: Does not bruise/bleed easily.   Psychiatric/Behavioral: Negative for depression, anxiety, suicidal ideas, insomnia and memory loss.      Exam:  Ht 1.765 m (5' 9.5\")   Wt 110.2 kg (243 lb)     General:  Well nourished, well developed in NAD  Head is grossly normal.  Neck: Supple without JVD, Trachea midline, no thyromegaly noted  Pulmonary:  Normal effort. Speaking in full sentences  Psych: affect appropriate  Skin: no Jaundice noted or facial rashes    Please note that this dictation was created using voice recognition software. I have made every reasonable attempt to correct obvious errors, but I expect that there are errors of grammar and possibly content that I did not discover before finalizing the note.    Assessment/Plan:  1. Lymphedema of both lower extremities   reviewed. Last filled 6 months ago.    - oxyCODONE-acetaminophen (PERCOCET) 5-325 MG Tab; Take 1 Tab by mouth 2 times a day as needed for Moderate Pain or Severe Pain for up to 30 days. Take 1 or 2 tabs  Dispense: 60 Tab; Refill: 0    2. Umbilical hernia without obstruction and without gangrene  - REFERRAL TO GENERAL SURGERY    3. Screening for colon cancer  - REFERRAL TO GASTROENTEROLOGY    4. Polyp of colon, unspecified part of colon, " unspecified type   REFERRAL TO GASTROENTEROLOGY    Follow up with new PCP in January.

## 2020-12-21 ENCOUNTER — HOSPITAL ENCOUNTER (OUTPATIENT)
Dept: RADIOLOGY | Facility: MEDICAL CENTER | Age: 65
End: 2020-12-21
Attending: FAMILY MEDICINE
Payer: MEDICARE

## 2020-12-21 DIAGNOSIS — Z12.31 VISIT FOR SCREENING MAMMOGRAM: ICD-10-CM

## 2020-12-21 PROCEDURE — 77063 BREAST TOMOSYNTHESIS BI: CPT

## 2020-12-21 PROCEDURE — 77067 SCR MAMMO BI INCL CAD: CPT

## 2021-01-12 ENCOUNTER — TELEMEDICINE (OUTPATIENT)
Dept: MEDICAL GROUP | Facility: IMAGING CENTER | Age: 66
End: 2021-01-12
Payer: MEDICARE

## 2021-01-12 VITALS — HEIGHT: 70 IN | BODY MASS INDEX: 34.79 KG/M2 | WEIGHT: 243 LBS

## 2021-01-12 DIAGNOSIS — I89.0 LYMPHEDEMA OF LEFT LOWER EXTREMITY: ICD-10-CM

## 2021-01-12 DIAGNOSIS — Z76.89 ENCOUNTER TO ESTABLISH CARE: ICD-10-CM

## 2021-01-12 DIAGNOSIS — K42.9 UMBILICAL HERNIA WITHOUT OBSTRUCTION AND WITHOUT GANGRENE: ICD-10-CM

## 2021-01-12 PROCEDURE — 99213 OFFICE O/P EST LOW 20 MIN: CPT | Mod: 95,CR | Performed by: FAMILY MEDICINE

## 2021-01-12 RX ORDER — OXYCODONE HYDROCHLORIDE AND ACETAMINOPHEN 5; 325 MG/1; MG/1
1 TABLET ORAL EVERY 8 HOURS PRN
Status: ON HOLD | COMMUNITY
End: 2021-02-16

## 2021-01-12 RX ORDER — POLYETHYLENE GLYCOL-3350 AND ELECTROLYTES 236; 6.74; 5.86; 2.97; 22.74 G/274.31G; G/274.31G; G/274.31G; G/274.31G; G/274.31G
POWDER, FOR SOLUTION ORAL
Status: ON HOLD | COMMUNITY
Start: 2021-01-10 | End: 2021-02-16

## 2021-01-12 ASSESSMENT — PATIENT HEALTH QUESTIONNAIRE - PHQ9: CLINICAL INTERPRETATION OF PHQ2 SCORE: 0

## 2021-01-12 NOTE — PROGRESS NOTES
Telemedicine Visit: New Patient     This encounter was conducted via Zoom.   Verbal consent was obtained. Patient's identity was verified. Patient aware this will be   billed the same as an in person evaluation.  Patient in home, I am in office at [address] [place] [address]  Subjective:     Chief Complaint   Patient presents with   • Establish Care     Dorcas Michael is a 65 y.o. female with history of lymphedema and osteoarthritis on virtual visit to discuss establishing care. Here prior PCP Dr. Nick who left the left the network.   Takes percocet for the lymphedema. She just broke her toe yesterday after stubbing it. She takes 4-5 tabs per month. She takes the celebrex daily. She does not need refills. She is having her umbilical hernia repaired in February.   No fevers chills cough or sob. Having colonoscopy in a few weeks.      ROS  See HPI  Constitutional: Negative for fever, chills and malaise/fatigue.   HENT: Negative for congestion, itchy watery eyes  Eyes: Negative for pain or sudden changes in vision  Respiratory: Negative for cough and shortness of breath.    Cardiovascular: Negative for leg swelling or chest pain  Gastrointestinal: Negative for nausea, vomiting, abdominal pain and diarrhea.   Genitourinary: Negative for dysuria and hematuria.   Skin: Negative for rash or concerning moles   Neurological: Negative for dizziness, focal weakness   Psychiatric/Behavioral: Negative for depression.  The patient is not nervous/anxious.    Musculoskeletal: no weakness or joint stiffness    No Known Allergies    Current medicines (including changes today)  Current Outpatient Medications   Medication Sig Dispense Refill   • GAVILYTE-G 236 g Recon Soln      • oxyCODONE-acetaminophen (PERCOCET) 5-325 MG Tab Take 1 Tab by mouth every 8 hours as needed.     • celecoxib (CELEBREX) 200 MG Cap TAKE 1 CAPSULE BY MOUTH EVERY DAY 90 Cap 3   • B Complex Vitamins (VITAMIN B COMPLEX PO)      • nystatin (MYCOSTATIN)  "242304 UNIT/GM Cream topical cream APPLY 1 GM TO AFFECTED AREA(S) 2 TIMES A DAY. 90 g 2   • Cholecalciferol (VITAMIN D3) 5000 UNITS Cap Take 1 Cap by mouth every day.     • FIBER PO Take  by mouth every day.     • diphenhydrAMINE (BENADRYL) 25 MG Tab Take 25 mg by mouth every 6 hours as needed for Sleep.       No current facility-administered medications for this visit.        She  has a past medical history of Arthritis, Lymphedema (1995), Pain, Pain, and Pain.  She  has a past surgical history that includes knee arthrotomy (Right, 1971); knee arthrotomy (Left, 1973); rectocele repair (6/1990); knee arthroscopy (Right, 5/1998); knee arthroplasty total (Right, 12/2003); achilles tendon rep (Right, 5/2005); knee arthroplasty total (11/3/2009); knee manipulation (3/1/2010); finger arthroplasty (Right, 9/20/2016); tendon transfer (Right, 9/20/2016); pin insertion (Right, 9/20/2016); finger arthroplasty (Left, 8/22/2017); tendon transfer (Left, 8/22/2017); and capsulotomy (8/22/2017).      Family History   Problem Relation Age of Onset   • Heart Disease Other    • Other Mother         MS   • Thyroid Daughter    • GI Disease Other         Crohns   • Thyroid Sister      Family Status   Relation Name Status   • OTHER  (Not Specified)   • Mo  (Not Specified)   • Ramesh  (Not Specified)   • OTHER  (Not Specified)   • Sis  (Not Specified)       Patient Active Problem List    Diagnosis Date Noted   • Umbilical hernia without obstruction and without gangrene 05/20/2020   • Achilles tendinitis of left lower extremity 05/20/2020   • Primary arthrosis of first carpometacarpal joints, bilateral 08/22/2017   • Obesity (BMI 30-39.9) 06/29/2017   • Bilateral primary osteoarthritis of first carpometacarpal joints 09/20/2016   • Lymphedema of left lower extremity 06/09/2016   • History of bilateral knee replacement 06/09/2016          Objective:   Vitals obtained by patient:  Ht 1.765 m (5' 9.5\")   Wt 110.2 kg (243 lb)   BMI 35.37 kg/m² "     Physical Exam:  Constitutional: Alert, no distress, well-groomed.  Skin: No rashes in visible areas.  Eye: Round. Conjunctiva clear, lids normal. No icterus.   ENMT: Lips pink without lesions, good dentition, moist mucous membranes. Phonation normal.  Neck: No masses, no thyromegaly. Moves freely without pain.  CV: Pulse as reported by patient  Respiratory: Unlabored respiratory effort, no cough or audible wheeze  Psych: Alert and oriented x3, normal affect and mood.   Neuro: symmetric face. Alert and oriented. Follows commands. No aphasia or dysarthria.    Labs:  No visits with results within 1 Month(s) from this visit.   Latest known visit with results is:   Hospital Outpatient Visit on 05/20/2020   Component Date Value Ref Range Status   • Cholesterol,Tot 05/20/2020 210* 100 - 199 mg/dL Final   • Triglycerides 05/20/2020 52  0 - 149 mg/dL Final   • HDL 05/20/2020 83  >=40 mg/dL Final   • LDL 05/20/2020 117* <100 mg/dL Final   • Sodium 05/20/2020 141  135 - 145 mmol/L Final   • Potassium 05/20/2020 4.4  3.6 - 5.5 mmol/L Final   • Chloride 05/20/2020 107  96 - 112 mmol/L Final   • Co2 05/20/2020 24  20 - 33 mmol/L Final   • Anion Gap 05/20/2020 10.0  7.0 - 16.0 Final   • Glucose 05/20/2020 104* 65 - 99 mg/dL Final   • Bun 05/20/2020 19  8 - 22 mg/dL Final   • Creatinine 05/20/2020 0.72  0.50 - 1.40 mg/dL Final   • Calcium 05/20/2020 9.6  8.5 - 10.5 mg/dL Final   • AST(SGOT) 05/20/2020 24  12 - 45 U/L Final   • ALT(SGPT) 05/20/2020 26  2 - 50 U/L Final   • Alkaline Phosphatase 05/20/2020 60  30 - 99 U/L Final   • Total Bilirubin 05/20/2020 1.0  0.1 - 1.5 mg/dL Final   • Albumin 05/20/2020 4.3  3.2 - 4.9 g/dL Final   • Total Protein 05/20/2020 6.7  6.0 - 8.2 g/dL Final   • Globulin 05/20/2020 2.4  1.9 - 3.5 g/dL Final   • A-G Ratio 05/20/2020 1.8  g/dL Final   • Fasting Status 05/20/2020 Fasting   Final   • GFR If  05/20/2020 >60  >60 mL/min/1.73 m 2 Final   • GFR If Non   05/20/2020 >60  >60 mL/min/1.73 m 2 Final       Imaging:   Ma-screening Mammo Bilat W/tomosynthesis W/cad    Result Date: 12/21/2020 12/21/2020 1:34 PM HISTORY/REASON FOR EXAM:  Routine Mammographic Screening. TECHNIQUE/EXAM DESCRIPTION: Bilateral tomosynthesis screening mammography was performed with standard mammographic images generated from the data set. Images were reviewed and interpreted with CAD. COMPARISON:   9/24/2019, 3/29/2018, 9/15/2016 FINDINGS: The breast parenchyma is heterogeneously dense which may obscure small masses. There is no dominant mass, suspicious calcification, or any secondary malignant sign. Stable calcifications are seen.     1.  Breasts are heterogeneously dense, which may obscure small masses. No gross evidence of malignancy. 2.  Screening mammogram in one year is recommended. R2- Category 2:  Benign Finding(s)      Assessment and Plan:   The following treatment plan was discussed:   -She will schedule an appointment when she runs out of her Percocet  -See her around November for Pap smear.  And at some point this year for her welcome to Medicare annual at a different visit but will need to be in person for her EKG.  Problem List Items Addressed This Visit     Lymphedema of left lower extremity     Takes Percocet from time to time.  And Celebrex daily.  Does not need refills at this time.         Umbilical hernia without obstruction and without gangrene     Scheduled for surgical repair February 2021           Other Visit Diagnoses     Encounter to establish care              Follow-up: Return in about 6 months (around 7/12/2021).        Portions of this note may be dictated using Dragon NaturallySpeaPrelert voice recognition software.  Variances in spelling and vocabulary are possible and unintentional.  Not all areas may be caught/corrected.  Please notify me if any discrepancies are noted or if the meaning of any statement is not correct/clear.

## 2021-02-11 ENCOUNTER — PRE-ADMISSION TESTING (OUTPATIENT)
Dept: ADMISSIONS | Facility: MEDICAL CENTER | Age: 66
End: 2021-02-11
Attending: SURGERY
Payer: MEDICARE

## 2021-02-11 DIAGNOSIS — Z01.812 PRE-OPERATIVE LABORATORY EXAMINATION: ICD-10-CM

## 2021-02-11 LAB
SARS-COV-2 RNA RESP QL NAA+PROBE: NOTDETECTED
SPECIMEN SOURCE: NORMAL

## 2021-02-11 PROCEDURE — C9803 HOPD COVID-19 SPEC COLLECT: HCPCS

## 2021-02-11 PROCEDURE — U0005 INFEC AGEN DETEC AMPLI PROBE: HCPCS

## 2021-02-11 PROCEDURE — U0003 INFECTIOUS AGENT DETECTION BY NUCLEIC ACID (DNA OR RNA); SEVERE ACUTE RESPIRATORY SYNDROME CORONAVIRUS 2 (SARS-COV-2) (CORONAVIRUS DISEASE [COVID-19]), AMPLIFIED PROBE TECHNIQUE, MAKING USE OF HIGH THROUGHPUT TECHNOLOGIES AS DESCRIBED BY CMS-2020-01-R: HCPCS

## 2021-02-16 ENCOUNTER — HOSPITAL ENCOUNTER (OUTPATIENT)
Facility: MEDICAL CENTER | Age: 66
End: 2021-02-16
Attending: SURGERY | Admitting: SURGERY
Payer: MEDICARE

## 2021-02-16 ENCOUNTER — ANESTHESIA (OUTPATIENT)
Dept: SURGERY | Facility: MEDICAL CENTER | Age: 66
End: 2021-02-16
Payer: MEDICARE

## 2021-02-16 ENCOUNTER — ANESTHESIA EVENT (OUTPATIENT)
Dept: SURGERY | Facility: MEDICAL CENTER | Age: 66
End: 2021-02-16
Payer: MEDICARE

## 2021-02-16 VITALS
RESPIRATION RATE: 16 BRPM | TEMPERATURE: 97.6 F | OXYGEN SATURATION: 93 % | SYSTOLIC BLOOD PRESSURE: 131 MMHG | HEIGHT: 70 IN | DIASTOLIC BLOOD PRESSURE: 69 MMHG | WEIGHT: 249.12 LBS | BODY MASS INDEX: 35.66 KG/M2 | HEART RATE: 79 BPM

## 2021-02-16 DIAGNOSIS — K42.9 UMBILICAL HERNIA WITHOUT OBSTRUCTION AND WITHOUT GANGRENE: ICD-10-CM

## 2021-02-16 PROCEDURE — A9270 NON-COVERED ITEM OR SERVICE: HCPCS | Performed by: ANESTHESIOLOGY

## 2021-02-16 PROCEDURE — A9270 NON-COVERED ITEM OR SERVICE: HCPCS | Performed by: SURGERY

## 2021-02-16 PROCEDURE — 700105 HCHG RX REV CODE 258: Performed by: SURGERY

## 2021-02-16 PROCEDURE — 501583 HCHG TROCAR, THRD CAN&SEAL 5X100: Performed by: SURGERY

## 2021-02-16 PROCEDURE — C1781 MESH (IMPLANTABLE): HCPCS | Performed by: SURGERY

## 2021-02-16 PROCEDURE — 501568 HCHG TROCAR, BLUNTPORT 12MM: Performed by: SURGERY

## 2021-02-16 PROCEDURE — 160046 HCHG PACU - 1ST 60 MINS PHASE II: Performed by: SURGERY

## 2021-02-16 PROCEDURE — 501572 HCHG TROCAR, SHIELD OBTU 5X100: Performed by: SURGERY

## 2021-02-16 PROCEDURE — 700101 HCHG RX REV CODE 250: Performed by: ANESTHESIOLOGY

## 2021-02-16 PROCEDURE — 700101 HCHG RX REV CODE 250: Performed by: SURGERY

## 2021-02-16 PROCEDURE — 700111 HCHG RX REV CODE 636 W/ 250 OVERRIDE (IP): Performed by: ANESTHESIOLOGY

## 2021-02-16 PROCEDURE — 160048 HCHG OR STATISTICAL LEVEL 1-5: Performed by: SURGERY

## 2021-02-16 PROCEDURE — 160025 RECOVERY II MINUTES (STATS): Performed by: SURGERY

## 2021-02-16 PROCEDURE — 501838 HCHG SUTURE GENERAL: Performed by: SURGERY

## 2021-02-16 PROCEDURE — 160002 HCHG RECOVERY MINUTES (STAT): Performed by: SURGERY

## 2021-02-16 PROCEDURE — 160009 HCHG ANES TIME/MIN: Performed by: SURGERY

## 2021-02-16 PROCEDURE — 160035 HCHG PACU - 1ST 60 MINS PHASE I: Performed by: SURGERY

## 2021-02-16 PROCEDURE — 700102 HCHG RX REV CODE 250 W/ 637 OVERRIDE(OP): Performed by: ANESTHESIOLOGY

## 2021-02-16 PROCEDURE — 160047 HCHG PACU  - EA ADDL 30 MINS PHASE II: Performed by: SURGERY

## 2021-02-16 PROCEDURE — 160028 HCHG SURGERY MINUTES - 1ST 30 MINS LEVEL 3: Performed by: SURGERY

## 2021-02-16 PROCEDURE — 160039 HCHG SURGERY MINUTES - EA ADDL 1 MIN LEVEL 3: Performed by: SURGERY

## 2021-02-16 PROCEDURE — 160036 HCHG PACU - EA ADDL 30 MINS PHASE I: Performed by: SURGERY

## 2021-02-16 DEVICE — MESH VENTRALIGHT ST 4.5IN 1EA/CA: Type: IMPLANTABLE DEVICE | Site: UMBILICAL | Status: FUNCTIONAL

## 2021-02-16 RX ORDER — BUPIVACAINE HYDROCHLORIDE 5 MG/ML
INJECTION, SOLUTION EPIDURAL; INTRACAUDAL
Status: DISCONTINUED
Start: 2021-02-16 | End: 2021-02-16 | Stop reason: HOSPADM

## 2021-02-16 RX ORDER — GLYCOPYRROLATE 0.2 MG/ML
INJECTION INTRAMUSCULAR; INTRAVENOUS PRN
Status: DISCONTINUED | OUTPATIENT
Start: 2021-02-16 | End: 2021-02-16 | Stop reason: SURG

## 2021-02-16 RX ORDER — DIPHENHYDRAMINE HYDROCHLORIDE 50 MG/ML
12.5 INJECTION INTRAMUSCULAR; INTRAVENOUS
Status: DISCONTINUED | OUTPATIENT
Start: 2021-02-16 | End: 2021-02-16 | Stop reason: HOSPADM

## 2021-02-16 RX ORDER — MIDAZOLAM HYDROCHLORIDE 1 MG/ML
INJECTION INTRAMUSCULAR; INTRAVENOUS PRN
Status: DISCONTINUED | OUTPATIENT
Start: 2021-02-16 | End: 2021-02-16 | Stop reason: SURG

## 2021-02-16 RX ORDER — CELECOXIB 200 MG/1
200 CAPSULE ORAL ONCE
Status: COMPLETED | OUTPATIENT
Start: 2021-02-16 | End: 2021-02-16

## 2021-02-16 RX ORDER — LIDOCAINE HYDROCHLORIDE 20 MG/ML
INJECTION, SOLUTION EPIDURAL; INFILTRATION; INTRACAUDAL; PERINEURAL PRN
Status: DISCONTINUED | OUTPATIENT
Start: 2021-02-16 | End: 2021-02-16 | Stop reason: SURG

## 2021-02-16 RX ORDER — OXYCODONE HCL 5 MG/5 ML
5 SOLUTION, ORAL ORAL
Status: COMPLETED | OUTPATIENT
Start: 2021-02-16 | End: 2021-02-16

## 2021-02-16 RX ORDER — SODIUM CHLORIDE, SODIUM LACTATE, POTASSIUM CHLORIDE, CALCIUM CHLORIDE 600; 310; 30; 20 MG/100ML; MG/100ML; MG/100ML; MG/100ML
INJECTION, SOLUTION INTRAVENOUS CONTINUOUS
Status: ACTIVE | OUTPATIENT
Start: 2021-02-16 | End: 2021-02-16

## 2021-02-16 RX ORDER — LIDOCAINE HYDROCHLORIDE 40 MG/ML
SOLUTION TOPICAL PRN
Status: DISCONTINUED | OUTPATIENT
Start: 2021-02-16 | End: 2021-02-16 | Stop reason: SURG

## 2021-02-16 RX ORDER — DEXAMETHASONE SODIUM PHOSPHATE 4 MG/ML
INJECTION, SOLUTION INTRA-ARTICULAR; INTRALESIONAL; INTRAMUSCULAR; INTRAVENOUS; SOFT TISSUE PRN
Status: DISCONTINUED | OUTPATIENT
Start: 2021-02-16 | End: 2021-02-16 | Stop reason: SURG

## 2021-02-16 RX ORDER — HYDROMORPHONE HYDROCHLORIDE 1 MG/ML
0.4 INJECTION, SOLUTION INTRAMUSCULAR; INTRAVENOUS; SUBCUTANEOUS
Status: DISCONTINUED | OUTPATIENT
Start: 2021-02-16 | End: 2021-02-16 | Stop reason: HOSPADM

## 2021-02-16 RX ORDER — MEPERIDINE HYDROCHLORIDE 25 MG/ML
12.5 INJECTION INTRAMUSCULAR; INTRAVENOUS; SUBCUTANEOUS
Status: DISCONTINUED | OUTPATIENT
Start: 2021-02-16 | End: 2021-02-16 | Stop reason: HOSPADM

## 2021-02-16 RX ORDER — OXYCODONE AND ACETAMINOPHEN 10; 325 MG/1; MG/1
1 TABLET ORAL EVERY 6 HOURS PRN
Qty: 20 TABLET | Refills: 0 | Status: SHIPPED | OUTPATIENT
Start: 2021-02-16 | End: 2021-02-21

## 2021-02-16 RX ORDER — ONDANSETRON 2 MG/ML
INJECTION INTRAMUSCULAR; INTRAVENOUS PRN
Status: DISCONTINUED | OUTPATIENT
Start: 2021-02-16 | End: 2021-02-16 | Stop reason: SURG

## 2021-02-16 RX ORDER — HYDROMORPHONE HYDROCHLORIDE 2 MG/ML
INJECTION, SOLUTION INTRAMUSCULAR; INTRAVENOUS; SUBCUTANEOUS PRN
Status: DISCONTINUED | OUTPATIENT
Start: 2021-02-16 | End: 2021-02-16 | Stop reason: SURG

## 2021-02-16 RX ORDER — HALOPERIDOL 5 MG/ML
1 INJECTION INTRAMUSCULAR
Status: DISCONTINUED | OUTPATIENT
Start: 2021-02-16 | End: 2021-02-16 | Stop reason: HOSPADM

## 2021-02-16 RX ORDER — ONDANSETRON 2 MG/ML
4 INJECTION INTRAMUSCULAR; INTRAVENOUS
Status: DISCONTINUED | OUTPATIENT
Start: 2021-02-16 | End: 2021-02-16 | Stop reason: HOSPADM

## 2021-02-16 RX ORDER — HYDROMORPHONE HYDROCHLORIDE 1 MG/ML
0.2 INJECTION, SOLUTION INTRAMUSCULAR; INTRAVENOUS; SUBCUTANEOUS
Status: DISCONTINUED | OUTPATIENT
Start: 2021-02-16 | End: 2021-02-16 | Stop reason: HOSPADM

## 2021-02-16 RX ORDER — SODIUM CHLORIDE, SODIUM LACTATE, POTASSIUM CHLORIDE, CALCIUM CHLORIDE 600; 310; 30; 20 MG/100ML; MG/100ML; MG/100ML; MG/100ML
INJECTION, SOLUTION INTRAVENOUS CONTINUOUS
Status: DISCONTINUED | OUTPATIENT
Start: 2021-02-16 | End: 2021-02-16 | Stop reason: HOSPADM

## 2021-02-16 RX ORDER — BUPIVACAINE HYDROCHLORIDE AND EPINEPHRINE 5; 5 MG/ML; UG/ML
INJECTION, SOLUTION EPIDURAL; INTRACAUDAL; PERINEURAL
Status: DISCONTINUED | OUTPATIENT
Start: 2021-02-16 | End: 2021-02-16 | Stop reason: HOSPADM

## 2021-02-16 RX ORDER — ROCURONIUM BROMIDE 10 MG/ML
INJECTION, SOLUTION INTRAVENOUS PRN
Status: DISCONTINUED | OUTPATIENT
Start: 2021-02-16 | End: 2021-02-16 | Stop reason: SURG

## 2021-02-16 RX ORDER — NEOSTIGMINE METHYLSULFATE 1 MG/ML
INJECTION, SOLUTION INTRAVENOUS PRN
Status: DISCONTINUED | OUTPATIENT
Start: 2021-02-16 | End: 2021-02-16 | Stop reason: SURG

## 2021-02-16 RX ORDER — ONDANSETRON 4 MG/1
4 TABLET, FILM COATED ORAL EVERY 8 HOURS PRN
Qty: 9 TABLET | Refills: 2 | Status: SHIPPED | OUTPATIENT
Start: 2021-02-16 | End: 2021-02-19

## 2021-02-16 RX ORDER — HYDROMORPHONE HYDROCHLORIDE 1 MG/ML
0.5 INJECTION, SOLUTION INTRAMUSCULAR; INTRAVENOUS; SUBCUTANEOUS
Status: DISCONTINUED | OUTPATIENT
Start: 2021-02-16 | End: 2021-02-16 | Stop reason: HOSPADM

## 2021-02-16 RX ORDER — HYDRALAZINE HYDROCHLORIDE 20 MG/ML
5 INJECTION INTRAMUSCULAR; INTRAVENOUS
Status: DISCONTINUED | OUTPATIENT
Start: 2021-02-16 | End: 2021-02-16 | Stop reason: HOSPADM

## 2021-02-16 RX ORDER — EPINEPHRINE 1 MG/ML(1)
AMPUL (ML) INJECTION
Status: DISCONTINUED
Start: 2021-02-16 | End: 2021-02-16 | Stop reason: HOSPADM

## 2021-02-16 RX ORDER — OXYCODONE HCL 5 MG/5 ML
10 SOLUTION, ORAL ORAL
Status: COMPLETED | OUTPATIENT
Start: 2021-02-16 | End: 2021-02-16

## 2021-02-16 RX ORDER — CEFAZOLIN SODIUM 1 G/3ML
INJECTION, POWDER, FOR SOLUTION INTRAMUSCULAR; INTRAVENOUS PRN
Status: DISCONTINUED | OUTPATIENT
Start: 2021-02-16 | End: 2021-02-16 | Stop reason: SURG

## 2021-02-16 RX ORDER — ACETAMINOPHEN 500 MG
1000 TABLET ORAL ONCE
Status: COMPLETED | OUTPATIENT
Start: 2021-02-16 | End: 2021-02-16

## 2021-02-16 RX ADMIN — ACETAMINOPHEN 1000 MG: 500 TABLET ORAL at 07:00

## 2021-02-16 RX ADMIN — NEOSTIGMINE METHYLSULFATE 4 MG: 1 INJECTION INTRAVENOUS at 09:14

## 2021-02-16 RX ADMIN — SODIUM CHLORIDE, POTASSIUM CHLORIDE, SODIUM LACTATE AND CALCIUM CHLORIDE: 600; 310; 30; 20 INJECTION, SOLUTION INTRAVENOUS at 06:59

## 2021-02-16 RX ADMIN — EPHEDRINE SULFATE 10 MG: 50 INJECTION, SOLUTION INTRAVENOUS at 08:36

## 2021-02-16 RX ADMIN — OXYCODONE HYDROCHLORIDE 10 MG: 5 SOLUTION ORAL at 09:53

## 2021-02-16 RX ADMIN — CEFAZOLIN 2 G: 330 INJECTION, POWDER, FOR SOLUTION INTRAMUSCULAR; INTRAVENOUS at 07:59

## 2021-02-16 RX ADMIN — HYDROMORPHONE HYDROCHLORIDE 0.25 MG: 2 INJECTION, SOLUTION INTRAMUSCULAR; INTRAVENOUS; SUBCUTANEOUS at 09:17

## 2021-02-16 RX ADMIN — POVIDONE IODINE 15 ML: 100 SOLUTION TOPICAL at 06:59

## 2021-02-16 RX ADMIN — CELECOXIB 200 MG: 200 CAPSULE ORAL at 07:00

## 2021-02-16 RX ADMIN — PROPOFOL 150 MG: 10 INJECTION, EMULSION INTRAVENOUS at 08:02

## 2021-02-16 RX ADMIN — GLYCOPYRROLATE 0.8 MG: 0.2 INJECTION INTRAMUSCULAR; INTRAVENOUS at 09:14

## 2021-02-16 RX ADMIN — LIDOCAINE HYDROCHLORIDE 4 ML: 40 SOLUTION TOPICAL at 08:04

## 2021-02-16 RX ADMIN — SODIUM CHLORIDE, POTASSIUM CHLORIDE, SODIUM LACTATE AND CALCIUM CHLORIDE: 600; 310; 30; 20 INJECTION, SOLUTION INTRAVENOUS at 09:00

## 2021-02-16 RX ADMIN — PROPOFOL 50 MG: 10 INJECTION, EMULSION INTRAVENOUS at 08:04

## 2021-02-16 RX ADMIN — ROCURONIUM BROMIDE 50 MG: 10 INJECTION, SOLUTION INTRAVENOUS at 08:02

## 2021-02-16 RX ADMIN — HYDROMORPHONE HYDROCHLORIDE 1 MG: 2 INJECTION, SOLUTION INTRAMUSCULAR; INTRAVENOUS; SUBCUTANEOUS at 08:02

## 2021-02-16 RX ADMIN — DEXAMETHASONE SODIUM PHOSPHATE 8 MG: 4 INJECTION, SOLUTION INTRA-ARTICULAR; INTRALESIONAL; INTRAMUSCULAR; INTRAVENOUS; SOFT TISSUE at 08:07

## 2021-02-16 RX ADMIN — MIDAZOLAM HYDROCHLORIDE 2 MG: 1 INJECTION, SOLUTION INTRAMUSCULAR; INTRAVENOUS at 07:59

## 2021-02-16 RX ADMIN — FENTANYL CITRATE 50 MCG: 50 INJECTION, SOLUTION INTRAMUSCULAR; INTRAVENOUS at 12:01

## 2021-02-16 RX ADMIN — ONDANSETRON 4 MG: 2 INJECTION INTRAMUSCULAR; INTRAVENOUS at 08:53

## 2021-02-16 RX ADMIN — FENTANYL CITRATE 50 MCG: 50 INJECTION, SOLUTION INTRAMUSCULAR; INTRAVENOUS at 09:52

## 2021-02-16 RX ADMIN — LIDOCAINE HYDROCHLORIDE 100 MG: 20 INJECTION, SOLUTION EPIDURAL; INFILTRATION; INTRACAUDAL at 08:02

## 2021-02-16 RX ADMIN — FENTANYL CITRATE 100 MCG: 50 INJECTION, SOLUTION INTRAMUSCULAR; INTRAVENOUS at 09:11

## 2021-02-16 ASSESSMENT — PAIN DESCRIPTION - PAIN TYPE
TYPE: ACUTE PAIN
TYPE: ACUTE PAIN;SURGICAL PAIN
TYPE: ACUTE PAIN;SURGICAL PAIN
TYPE: SURGICAL PAIN
TYPE: SURGICAL PAIN
TYPE: ACUTE PAIN;SURGICAL PAIN

## 2021-02-16 ASSESSMENT — PAIN SCALES - GENERAL: PAIN_LEVEL: 6

## 2021-02-16 NOTE — ANESTHESIA POSTPROCEDURE EVALUATION
Patient: Dorcas Michael    Procedure Summary     Date: 02/16/21 Room / Location: [facility] ROOM 28 / SURGERY SAME DAY [facility]    Anesthesia Start: 0759 Anesthesia Stop: 0927    Procedure: REPAIR, HERNIA, VENTRAL, LAPAROSCOPIC - UMBILICAL (Abdomen) Diagnosis: (UMBICAL HERNIA)    Surgeons: Lilliana Mejia M.D. Responsible Provider: Jordan Loaiza M.D.    Anesthesia Type: general ASA Status: 2          Final Anesthesia Type: general  Last vitals  BP   Blood Pressure : 117/74    Temp   36.4 °C (97.6 °F)    Pulse   63   Resp   14    SpO2   96 %      Anesthesia Post Evaluation    Patient location during evaluation: PACU  Patient participation: complete - patient participated  Level of consciousness: awake and alert  Pain score: 6    Airway patency: patent  Anesthetic complications: no  Cardiovascular status: hemodynamically stable  Respiratory status: acceptable  Hydration status: euvolemic    PONV: none          No complications documented.

## 2021-02-16 NOTE — OR NURSING
"0958 assumed care from JAGUAR Le. Repositioned for comfort using pillows.     1015 pt states pain is \"better\" at 5/10. Resting comfortably.     1030 telephone updates and discharge instructions given to PRADEEP Giang.     1038 phase 1 complete. Pt tolerating sips of water.     1041 assisted pt up into bathroom    1055 returned from bathroom. Pt able to void. Now sitting up in recliner chair.     1103 handoff to JAGUAR Chapman    1123 re-assumed care from JAGUAR Chapman. Pt resting in recliner chair. Denies any needs at this time. Pain is tolerable. No nausea.    1145 no status changes. Pt resting comfortably.    1201 repeat dose IV fentanyl 50mcg given for 6/10 surgical incision pain.     1223 pt getting dressed with assistance. Pain level 4/10 and tolerable.    1230 SO Rahat notified for     1250 PIV removed with tip intact.     1253 Pt discharged home in stable condition. Down to pick-up area via wheelchair accompanied by Grace. All belongings taken.     "

## 2021-02-16 NOTE — ANESTHESIA PROCEDURE NOTES
Airway    Date/Time: 2/16/2021 8:04 AM  Performed by: Jordan Loaiza M.D.  Authorized by: Jordan Loaiza M.D.     Location:  OR  Urgency:  Elective  Difficult Airway: No    Indications for Airway Management:  Anesthesia      Spontaneous Ventilation: absent    Sedation Level:  Deep  Preoxygenated: Yes    Patient Position:  Sniffing  Mask Difficulty Assessment:  1 - vent by mask  Final Airway Type:  Endotracheal airway  Final Endotracheal Airway:  ETT  Cuffed: Yes    Technique Used for Successful ETT Placement:  Direct laryngoscopy    Insertion Site:  Oral  Blade Type:  Ji  Laryngoscope Blade/Videolaryngoscope Blade Size:  2  ETT Size (mm):  7.0  Measured from:  Lips  ETT to Lips (cm):  20  Placement Verified by: auscultation and capnometry    Cormack-Lehane Classification:  Grade I - full view of glottis  Number of Attempts at Approach:  1

## 2021-02-16 NOTE — ANESTHESIA PREPROCEDURE EVALUATION
Arthritis, lower extremity lymphedema with chronic pain issues (takes Percocet once or twice a week). Prior GA without issues. Denies: MI/CHF/smoking/CVA/DM/CKD      Relevant Problems   No relevant active problems       Physical Exam    Airway   Mallampati: II  TM distance: >3 FB  Neck ROM: full       Cardiovascular - normal exam  Rhythm: regular  Rate: normal  (-) murmur     Dental - normal exam           Pulmonary - normal exam  Breath sounds clear to auscultation     Abdominal    Neurological - normal exam                 Anesthesia Plan    ASA 2       Plan - general       Airway plan will be ETT          Induction: intravenous    Postoperative Plan: Postoperative administration of opioids is intended.    Pertinent diagnostic labs and testing reviewed    Informed Consent:    Anesthetic plan and risks discussed with patient.    Use of blood products discussed with: patient whom consented to blood products.

## 2021-02-16 NOTE — OR NURSING
0928 Received pt from the OR with Dr Loaiza, oral airway in place, abdomen with clear dressings, no bleeding to note, ice pack in place, abdomen slightly distended..  VSS, in no signs of distress.     0940 Oral airway D/C'd, pt aware of POC, pt denies pain, VSS.    0955 Pt complains of 8/10 pain. Medicated. VSS, in no signs of distress.     0919 Report off to Jaleesa KEEN RN.

## 2021-02-16 NOTE — ANESTHESIA TIME REPORT
Anesthesia Start and Stop Event Times     Date Time Event    2/16/2021 0740 Ready for Procedure     0759 Anesthesia Start     0927 Anesthesia Stop        Responsible Staff  02/16/21    Name Role Begin End    Jordan Loaiza M.D. Anesth 0759 0999        Preop Diagnosis (Free Text):  Pre-op Diagnosis     UMBICAL HERNIA        Preop Diagnosis (Codes):    Post op Diagnosis  Umbilical hernia      Premium Reason  Non-Premium    Comments:

## 2021-02-16 NOTE — OP REPORT
Pre op diagnosis:    1.  Umbilical hernia  2.  Obesity    Post op diagnosis:  Same    Procedure:  Laparoscopic repair of umbilical hernia    Surgeon:  Lilliana Mejia MD  Assistant:  Tamra Graff CFA  Anesthesiologist:  Royer Loaiza MD    Anesthesia:  General ETT  Pre op meds:  Ancef   ASA 2    Indications:  This is a 65 year old female with an enlarging umbilical hernia.  After discussing risks and benefits, she is ready to proceed with repair.  Due to her BMI of 37 and abdominal obesity, I felt she was higher risk for wound healing problems from an open repair, and advised laparoscopic approach.    Findings:  2.5cm umbilical defect with preperitoneal fat    Summary:  The patient was anesthetized, then her arms were padded and tucked.  She was prepped and draped in sterile fashion.  Time out was confirmed.  Local anesthetic was injected prior to all incisions.      I started with a left midabdominal incision and dissected through the anterior and posterior fascial layers, spreading the muscle between.  0-vicryl stay sutures were placed and the Booker blunt tipped port was inserted.  Under camera visualization, I placed two 5mm left abdominal ports.  The abdominal pressure was lowered and the defect was measured.  The 11.4cm Ventralight ST mesh was selected and 0-ethibond anchor sutures were placed.  The mesh was inserted and then unfurled.  I secured the anchor sutures transfascially through separate stab sites, and the mesh was demonstrated to lay well without tension or unnecessary wrinkling.  I then used the Ethicon SecureStrap tacker to secure the edges of the mesh in 1cm increments.  I then placed four additional transfascial sutures for additional mesh security.  There was excellent hemostasis.    I then closed the mid-abdominal port site with multiple 0-ethibond sutures.  The wound was irrigated and then 4-0 monocryl was used to close the skin.  Dressings were placed and I was informed all counts were  correct.      CC: Kenia Nick MD

## 2021-02-16 NOTE — OR NURSING
1109 Break RN. Pt resting, appears comfortable in recliner chair. Respirations even and unlabored. No needs at this time.     1123 Report back to Jaleesa HUGO RN.

## 2021-02-16 NOTE — DISCHARGE INSTRUCTIONS
ACTIVITY: Rest and take it easy for the first 24 hours.  A responsible adult is recommended to remain with you during that time.  It is normal to feel sleepy.  We encourage you to not do anything that requires balance, judgment or coordination.    MILD FLU-LIKE SYMPTOMS ARE NORMAL. YOU MAY EXPERIENCE GENERALIZED MUSCLE ACHES, THROAT IRRITATION, HEADACHE AND/OR SOME NAUSEA.    FOR 24 HOURS DO NOT:  Drive, operate machinery or run household appliances.  Drink beer or alcoholic beverages.   Make important decisions or sign legal documents.    SPECIAL INSTRUCTIONS: Please refer to Dr. Mejia's specific instructions previously received (in a folder)  · May shower but no baths for 4 weeks.   · Clear dressing is water proof, may get wet. Remove only when told to do so by Dr. Mejia.  · Ambulate regularly on flat ground (minimize stairs).   · No lifting over 5 pounds.    DIET: No restrictions. To avoid nausea, slowly advance diet as tolerated, avoiding spicy or greasy foods for the first day.  Add more substantial food to your diet according to your physician's instructions. INCREASE FLUIDS AND FIBER TO AVOID CONSTIPATION.    FOLLOW-UP APPOINTMENT:  A follow-up appointment should be arranged with your doctor; call to schedule unless already previously arranged.    You should CALL YOUR PHYSICIAN if you develop:  Fever greater than 101 degrees F.  Pain not relieved by medication, or persistent nausea or vomiting.  Excessive bleeding (blood soaking through dressing) or unexpected drainage from the wound.  Extreme redness or swelling around the incision site, drainage of pus or foul smelling drainage.  Inability to urinate or empty your bladder within 8 hours.  Problems with breathing or chest pain.    You should call 911 if you develop problems with breathing or chest pain.  If you are unable to contact your doctor or surgical center, you should go to the nearest emergency room or urgent care center.      Dr. Mejia's telephone #:  [phone]    If any questions arise, call your doctor.  If your doctor is not available, please feel free to call the Surgical Center at [phone]. The Contact Center is open Monday through Friday 7AM to 5PM and may speak to a nurse at [phone], or toll free at [phone].     A registered nurse may call you a few days after your surgery to see how you are doing after your procedure.    MEDICATIONS: Resume taking daily medication.  Take prescribed pain medication with food.  If no medication is prescribed, you may take non-aspirin pain medication if needed.  PAIN MEDICATION CAN BE VERY CONSTIPATING.  Take a stool softener or laxative such as senokot, pericolace, or milk of magnesia if needed.    Prescription given for ZOFRAN / PERCOCET - please  at [facility]/pharmacy #[id number] - [place], [place] - [address]      Last pain medication given at 10:00AM - may take next dose at/after 4:00PM    Also received Tylenol 1000mg and Celebrex 200mg (NSAID like ibuprofen/motrin) at 7:00AM - may take ibuprofen/motrin/advil at/after 1:00 PM.   Dr. Mejia has prescribed pain medication that includes Acetaminophen (Tylenol), do not take additional Acetaminophen (Tylenol) while taking the prescribed medication.    Depression / Suicide Risk    As you are discharged from this [facility] facility, it is important to learn how to keep safe from harming yourself.    Recognize the warning signs:  · Abrupt changes in personality, positive or negative- including increase in energy   · Giving away possessions  · Change in eating patterns- significant weight changes-  positive or negative  · Change in sleeping patterns- unable to sleep or sleeping all the time   · Unwillingness or inability to communicate  · Depression  · Unusual sadness, discouragement and loneliness  · Talk of wanting to die  · Neglect of personal appearance   · Rebelliousness- reckless behavior  · Withdrawal from people/activities they love  · Confusion-  inability to concentrate     If you or a loved one observes any of these behaviors or has concerns about self-harm, here's what you can do:  · Talk about it- your feelings and reasons for harming yourself  · Remove any means that you might use to hurt yourself (examples: pills, rope, extension cords, firearm)  · Get professional help from the community (Mental Health, Substance Abuse, psychological counseling)  · Do not be alone:Call your Safe Contact- someone whom you trust who will be there for you.  · Call your local CRISIS HOTLINE 874-2754 or 212-361-2696  · Call your local Children's Mobile Crisis Response Team [place] (537) 837-4287 or www.Biexdiao.com  · Call the toll free National Suicide Prevention Hotlines   · National Suicide Prevention Lifeline 496-407-FDAD (1225)  · National Hope Line Network 800-SUICIDE (947-9620)

## 2021-03-03 DIAGNOSIS — Z23 NEED FOR VACCINATION: ICD-10-CM

## 2021-03-12 ENCOUNTER — IMMUNIZATION (OUTPATIENT)
Dept: FAMILY PLANNING/WOMEN'S HEALTH CLINIC | Facility: IMMUNIZATION CENTER | Age: 66
End: 2021-03-12
Attending: INTERNAL MEDICINE
Payer: MEDICARE

## 2021-03-12 DIAGNOSIS — Z23 ENCOUNTER FOR VACCINATION: Primary | ICD-10-CM

## 2021-03-12 DIAGNOSIS — Z23 NEED FOR VACCINATION: ICD-10-CM

## 2021-03-12 PROCEDURE — 0011A MODERNA SARS-COV-2 VACCINE: CPT | Performed by: INTERNAL MEDICINE

## 2021-03-12 PROCEDURE — 91301 MODERNA SARS-COV-2 VACCINE: CPT | Performed by: INTERNAL MEDICINE

## 2021-03-26 ENCOUNTER — TELEMEDICINE (OUTPATIENT)
Dept: MEDICAL GROUP | Facility: IMAGING CENTER | Age: 66
End: 2021-03-26
Payer: MEDICARE

## 2021-03-26 VITALS
SYSTOLIC BLOOD PRESSURE: 134 MMHG | WEIGHT: 243 LBS | HEIGHT: 70 IN | DIASTOLIC BLOOD PRESSURE: 78 MMHG | TEMPERATURE: 97.1 F | BODY MASS INDEX: 34.79 KG/M2

## 2021-03-26 DIAGNOSIS — I89.0 LYMPHEDEMA: ICD-10-CM

## 2021-03-26 PROCEDURE — 99213 OFFICE O/P EST LOW 20 MIN: CPT | Mod: 95,CR | Performed by: FAMILY MEDICINE

## 2021-03-26 RX ORDER — OXYCODONE HYDROCHLORIDE AND ACETAMINOPHEN 5; 325 MG/1; MG/1
1 TABLET ORAL
Qty: 60 TABLET | Refills: 0 | Status: SHIPPED | OUTPATIENT
Start: 2021-03-26 | End: 2021-05-25

## 2021-03-26 RX ORDER — OXYCODONE HYDROCHLORIDE AND ACETAMINOPHEN 5; 325 MG/1; MG/1
1-2 TABLET ORAL 2 TIMES DAILY PRN
COMMUNITY
End: 2021-03-26 | Stop reason: SDUPTHER

## 2021-03-26 ASSESSMENT — PATIENT HEALTH QUESTIONNAIRE - PHQ9: CLINICAL INTERPRETATION OF PHQ2 SCORE: 0

## 2021-03-26 ASSESSMENT — PAIN SCALES - GENERAL: PAINLEVEL: NO PAIN

## 2021-03-26 NOTE — PROGRESS NOTES
Telemedicine Visit: New Patient     This encounter was conducted via Zoom.   Verbal consent was obtained. Patient's identity was verified. Patient aware this will be   billed the same as an in person evaluation.  Patient in home, I am in office at [address] [place] [address]  Subjective:     Chief Complaint   Patient presents with   • Medication Refill     Percocet       Dorcas Michael is a 65 y.o. female with history of lymphedema of the left lower extremity, history of bilateral knee replacements, BMI 34, primary arthrosis of first carpometacarpal joints, and Achilles tendinitis of the left lower extremity on virtual visit to discuss pain management.     Hernia repair mid feb. Taking percocet for lymphedema. She has seen a lymphedema clinic who started her on the pump and compression stockings. She did have her veins stripped to attempt to treat which did not help.     No chest pain shortness of breath, melena, blood in stool, nausea vomiting.     ROS  See HPI  Constitutional: Negative for fever, chills and malaise/fatigue.   HENT: Negative for congestion, itchy watery eyes  Eyes: Negative for pain or sudden changes in vision  Respiratory: Negative for cough and shortness of breath.    Cardiovascular: Negative for leg swelling or chest pain  Gastrointestinal: Negative for nausea, vomiting, abdominal pain and diarrhea.   Genitourinary: Negative for dysuria and hematuria.   Skin: Negative for rash or concerning moles   Neurological: Negative for dizziness, focal weakness   Psychiatric/Behavioral: Negative for depression.  The patient is not nervous/anxious.    Musculoskeletal: no weakness or joint stiffness    No Known Allergies    Current medicines (including changes today)  Current Outpatient Medications   Medication Sig Dispense Refill   • oxyCODONE-acetaminophen (PERCOCET) 5-325 MG Tab Take 1 tablet by mouth 1 time a day as needed for Severe Pain for up to 60 days. 60 tablet 0   • celecoxib (CELEBREX)  200 MG Cap TAKE 1 CAPSULE BY MOUTH EVERY DAY 90 Cap 3   • B Complex Vitamins (VITAMIN B COMPLEX PO) every day.     • nystatin (MYCOSTATIN) 185947 UNIT/GM Cream topical cream APPLY 1 GM TO AFFECTED AREA(S) 2 TIMES A DAY. 90 g 2   • Cholecalciferol (VITAMIN D3) 5000 UNITS Cap Take 1 Cap by mouth every day.     • FIBER PO Take  by mouth every day.     • diphenhydrAMINE (BENADRYL) 25 MG Tab Take 25 mg by mouth every 6 hours as needed for Sleep.       No current facility-administered medications for this visit.       She  has a past medical history of Arthritis, Lymphedema (1995), Pain, Pain, and Pain.  She  has a past surgical history that includes knee arthrotomy (Right, 1971); knee arthrotomy (Left, 1973); rectocele repair (6/1990); knee arthroscopy (Right, 5/1998); knee arthroplasty total (Right, 12/2003); achilles tendon rep (Right, 5/2005); knee arthroplasty total (11/3/2009); knee manipulation (3/1/2010); finger arthroplasty (Right, 9/20/2016); tendon transfer (Right, 9/20/2016); pin insertion (Right, 9/20/2016); finger arthroplasty (Left, 8/22/2017); tendon transfer (Left, 8/22/2017); capsulotomy (8/22/2017); and ventral hernia repair laparoscopic (2/16/2021).      Family History   Problem Relation Age of Onset   • Heart Disease Other    • Other Mother         MS   • Thyroid Daughter    • GI Disease Other         Crohns   • Thyroid Sister      Family Status   Relation Name Status   • OTHER  (Not Specified)   • Mo  (Not Specified)   • Ramesh  (Not Specified)   • OTHER  (Not Specified)   • Sis  (Not Specified)       Patient Active Problem List    Diagnosis Date Noted   • Umbilical hernia without obstruction and without gangrene 05/20/2020   • Achilles tendinitis of left lower extremity 05/20/2020   • Primary arthrosis of first carpometacarpal joints, bilateral 08/22/2017   • Obesity (BMI 30-39.9) 06/29/2017   • Bilateral primary osteoarthritis of first carpometacarpal joints 09/20/2016   • Lymphedema of left lower  "extremity 06/09/2016   • History of bilateral knee replacement 06/09/2016          Objective:   Vitals obtained by patient:  /78   Temp 36.2 °C (97.1 °F)   Ht 1.778 m (5' 10\")   Wt 110 kg (243 lb)   BMI 34.87 kg/m²     Physical Exam:  Constitutional: Alert, no distress, well-groomed.  Skin: No rashes in visible areas.  Eye: Round. Conjunctiva clear, lids normal. No icterus.   ENMT: Lips pink without lesions, good dentition, moist mucous membranes. Phonation normal.  Neck: No masses, no thyromegaly. Moves freely without pain.  CV: Pulse as reported by patient  Respiratory: Unlabored respiratory effort, no cough or audible wheeze  Psych: Alert and oriented x3, normal affect and mood.   Neuro: symmetric face. Alert and oriented. Follows commands. No aphasia or dysarthria.    Labs:  No visits with results within 1 Month(s) from this visit.   Latest known visit with results is:   Pre-Admission Testing on 02/11/2021   Component Date Value Ref Range Status   • SARS-CoV-2 Source 02/11/2021 Nasal Swab   Final   • SARS-CoV-2 by PCR 02/11/2021 NotDetected   Final    Comment: PATIENTS: Important information regarding your results and instructions can  be found at [email or web address]   \"After your  Covid-19 Test\"  [facility] providers: PLEASE REFER TO DE-ESCALATION AND RETESTING PROTOCOL  on [email or web address]  **The TaqPath COVID-19 SARS-CoV-2 test has been made available for use under  the Emergency Use Authorization (EUA) only.         Imaging:   No results found.    Assessment and Plan:   The following treatment plan was discussed:   PDMP reviewed and appropriate  Opiate risk tool 0  Unable to contract as this is a virtual visit  She will return to in office for annual Medicare wellness I suspect is her first 1.  Consider EKG  Problem List Items Addressed This Visit     None      Visit Diagnoses     Lymphedema        Relevant Medications    oxyCODONE-acetaminophen (PERCOCET) 5-325 MG Tab    "       Follow-up: Return for annual.        Portions of this note may be dictated using Dragon [employer] voice recognition software.  Variances in spelling and vocabulary are possible and unintentional.  Not all areas may be caught/corrected.  Please notify me if any discrepancies are noted or if the meaning of any statement is not correct/clear.

## 2021-04-10 ENCOUNTER — IMMUNIZATION (OUTPATIENT)
Dept: FAMILY PLANNING/WOMEN'S HEALTH CLINIC | Facility: IMMUNIZATION CENTER | Age: 66
End: 2021-04-10
Attending: INTERNAL MEDICINE
Payer: MEDICARE

## 2021-04-10 DIAGNOSIS — Z23 ENCOUNTER FOR VACCINATION: Primary | ICD-10-CM

## 2021-04-10 PROCEDURE — 91301 MODERNA SARS-COV-2 VACCINE: CPT

## 2021-04-10 PROCEDURE — 0012A MODERNA SARS-COV-2 VACCINE: CPT

## 2021-07-06 ENCOUNTER — OFFICE VISIT (OUTPATIENT)
Dept: MEDICAL GROUP | Facility: IMAGING CENTER | Age: 66
End: 2021-07-06
Payer: MEDICARE

## 2021-07-06 ENCOUNTER — HOSPITAL ENCOUNTER (OUTPATIENT)
Facility: MEDICAL CENTER | Age: 66
End: 2021-07-06
Attending: FAMILY MEDICINE
Payer: MEDICARE

## 2021-07-06 VITALS
RESPIRATION RATE: 12 BRPM | OXYGEN SATURATION: 96 % | SYSTOLIC BLOOD PRESSURE: 118 MMHG | WEIGHT: 251.2 LBS | BODY MASS INDEX: 35.96 KG/M2 | TEMPERATURE: 97.4 F | HEIGHT: 70 IN | DIASTOLIC BLOOD PRESSURE: 74 MMHG | HEART RATE: 76 BPM

## 2021-07-06 DIAGNOSIS — L30.4 INTERTRIGO: ICD-10-CM

## 2021-07-06 DIAGNOSIS — R35.1 NOCTURIA: ICD-10-CM

## 2021-07-06 DIAGNOSIS — Z12.4 CERVICAL CANCER SCREENING: ICD-10-CM

## 2021-07-06 DIAGNOSIS — N30.01 ACUTE CYSTITIS WITH HEMATURIA: ICD-10-CM

## 2021-07-06 DIAGNOSIS — R33.9 URINE RETENTION: ICD-10-CM

## 2021-07-06 LAB
APPEARANCE UR: ABNORMAL
BILIRUB UR STRIP-MCNC: NEGATIVE MG/DL
COLOR UR AUTO: YELLOW
GLUCOSE UR STRIP.AUTO-MCNC: NEGATIVE MG/DL
KETONES UR STRIP.AUTO-MCNC: 15 MG/DL
LEUKOCYTE ESTERASE UR QL STRIP.AUTO: ABNORMAL
NITRITE UR QL STRIP.AUTO: NEGATIVE
PH UR STRIP.AUTO: 6.5 [PH] (ref 5–8)
PROT UR QL STRIP: NEGATIVE MG/DL
RBC UR QL AUTO: ABNORMAL
SP GR UR STRIP.AUTO: 1.02
UROBILINOGEN UR STRIP-MCNC: 0.2 MG/DL

## 2021-07-06 PROCEDURE — 88175 CYTOPATH C/V AUTO FLUID REDO: CPT

## 2021-07-06 PROCEDURE — 87624 HPV HI-RISK TYP POOLED RSLT: CPT

## 2021-07-06 PROCEDURE — 87077 CULTURE AEROBIC IDENTIFY: CPT

## 2021-07-06 PROCEDURE — 87186 SC STD MICRODIL/AGAR DIL: CPT

## 2021-07-06 PROCEDURE — 99214 OFFICE O/P EST MOD 30 MIN: CPT | Performed by: FAMILY MEDICINE

## 2021-07-06 PROCEDURE — 81002 URINALYSIS NONAUTO W/O SCOPE: CPT | Performed by: FAMILY MEDICINE

## 2021-07-06 PROCEDURE — 87086 URINE CULTURE/COLONY COUNT: CPT

## 2021-07-06 RX ORDER — SULFAMETHOXAZOLE AND TRIMETHOPRIM 800; 160 MG/1; MG/1
1 TABLET ORAL 2 TIMES DAILY
Qty: 6 TABLET | Refills: 0 | Status: SHIPPED | OUTPATIENT
Start: 2021-07-06 | End: 2021-07-09

## 2021-07-06 RX ORDER — NYSTATIN 100000 U/G
CREAM TOPICAL
Qty: 90 G | Refills: 2 | Status: SHIPPED | OUTPATIENT
Start: 2021-07-06

## 2021-07-06 RX ORDER — MIRABEGRON 25 MG/1
25 TABLET, FILM COATED, EXTENDED RELEASE ORAL
Qty: 30 TABLET | Status: CANCELLED | OUTPATIENT
Start: 2021-07-06

## 2021-07-06 ASSESSMENT — PAIN SCALES - GENERAL: PAINLEVEL: NO PAIN

## 2021-07-06 ASSESSMENT — PATIENT HEALTH QUESTIONNAIRE - PHQ9: CLINICAL INTERPRETATION OF PHQ2 SCORE: 0

## 2021-07-06 NOTE — PROGRESS NOTES
Chief Complaint   Patient presents with   • Gynecologic Exam   • Lymphedema Congenital   • Immunizations     shingrix     HPI:  65-year-old female with history of Achilles tendinitis tests, osteoarthritis, bilateral knee replacement, lymphedema of left lower extremity, and umbilical hernia here for Pap smear.    Would like shingrix vaccine. And discuss lymphedema. Last pap normal 2016. This will be her last pap. No pain with intercourse, no bleeding, no discharge.   Lymphedema. Has been to 'vein' clinic. Left leg worse than right. Left became worse after she was hit with soft ball. Has had some vein stripping. She has a pump she uses nightly. She has done PT for it and it did not help. She wears compression stockings though reports since it got worse they dont fit so stopped working. She takes percocet. Goes through 60 tabs in 6 month period. Has had b/l knee replacements as well. Recent percocet script sent.     Has some incontinence when she waits too long to go. Though feels like she does not empty her bladder. Wakes up a few times a night to urinate as well. No loss with sneezing or coughing. Had a surgery for this which resolved this issue. Small volume.   No urgency.     Precipitating causes? Caffeine, excessive fluid intake, obesity, pelvic floor prolapse, stool impaction      No Known Allergies  Current Outpatient Medications   Medication Sig Dispense Refill   • nystatin (MYCOSTATIN) 440530 UNIT/GM Cream topical cream APPLY 1 GM TO AFFECTED AREA(S) 2 TIMES A DAY. 90 g 2   • celecoxib (CELEBREX) 200 MG Cap TAKE 1 CAPSULE BY MOUTH EVERY DAY 90 Cap 3   • B Complex Vitamins (VITAMIN B COMPLEX PO) every day.     • Cholecalciferol (VITAMIN D3) 5000 UNITS Cap Take 1 Cap by mouth every day.     • FIBER PO Take  by mouth every day.     • diphenhydrAMINE (BENADRYL) 25 MG Tab Take 25 mg by mouth every 6 hours as needed for Sleep.       No current facility-administered medications for this visit.     Social History  "    Tobacco Use   • Smoking status: Never Smoker   • Smokeless tobacco: Never Used   Vaping Use   • Vaping Use: Never used   Substance Use Topics   • Alcohol use: Yes     Alcohol/week: 0.6 - 1.2 oz     Types: 1 - 2 Glasses of wine per week     Comment: 1-2 per day   • Drug use: No     Family History   Problem Relation Age of Onset   • Heart Disease Other    • Other Mother         MS   • Thyroid Daughter    • GI Disease Other         Crohns   • Thyroid Sister        /74 (BP Location: Left arm, Patient Position: Sitting, BP Cuff Size: Adult long)   Pulse 76   Temp 36.3 °C (97.4 °F) (Temporal)   Resp 12   Ht 1.778 m (5' 10\")   Wt 114 kg (251 lb 3.2 oz)   SpO2 96%  Body mass index is 36.04 kg/m².  Review of Systems   Constitutional: Negative for chills, fever and malaise/fatigue.   HENT: Negative for hearing loss and tinnitus.    Eyes: Negative for double vision and pain.   Respiratory: Negative for cough, shortness of breath and wheezing.    Cardiovascular: Negative for chest pain, palpitations and with leg swelling.   Gastrointestinal: Negative for abdominal pain, diarrhea, nausea and vomiting.   Genitourinary: Negative for dysuria and with frequency.   Musculoskeletal: Negative for joint pain and myalgias.   Skin: Negative for itching and rash.   Neurological: Negative for dizziness and headaches.     Physical Exam  Constitutional:       General: She is not in acute distress.     Appearance: She is not diaphoretic.   HENT:      Head: Normocephalic and atraumatic.      Right Ear: External ear normal.      Left Ear: External ear normal.      Nose: Nose normal.      Mouth/Throat:      Pharynx: No oropharyngeal exudate.   Eyes:      General: No scleral icterus.        Right eye: No discharge.         Left eye: No discharge.      Conjunctiva/sclera: Conjunctivae normal.      Pupils: Pupils are equal, round, and reactive to light.   Neck:      Thyroid: No thyromegaly.      Trachea: No tracheal deviation. " "  Cardiovascular:      Rate and Rhythm: Normal rate and regular rhythm.      Heart sounds: Normal heart sounds. No murmur heard.   No friction rub. No gallop.    Pulmonary:      Effort: Pulmonary effort is normal. No respiratory distress.      Breath sounds: Normal breath sounds. No wheezing or rales.   Abdominal:      General: Bowel sounds are normal. There is no distension.      Palpations: Abdomen is soft. There is no mass.      Tenderness: There is no abdominal tenderness. There is no guarding or rebound.   Genitourinary:     Comments: Normal external genitalia speculum exam with normal cervix vaginal mucosa moist slightly atrophic. Pelvic exam without adnexal masses bilaterally and uterus not appreciated due to body habitus  Musculoskeletal:         General: Normal range of motion.      Cervical back: Normal range of motion and neck supple.      Comments: Left leg with severe lymphedema no erythema or discoloration of the skin tone or increased warmth.   Lymphadenopathy:      Cervical: No cervical adenopathy.   Skin:     General: Skin is warm and dry.      Coloration: Skin is not pale.      Findings: No erythema or rash.   Neurological:      Mental Status: She is alert and oriented to person, place, and time.      Gait: Gait is intact.   Psychiatric:         Mood and Affect: Mood and affect normal.         Cognition and Memory: Memory normal.         Judgment: Judgment normal.               All labs (last 1 month):   No visits with results within 1 Month(s) from this visit.   Latest known visit with results is:   Pre-Admission Testing on 02/11/2021   Component Date Value Ref Range Status   • SARS-CoV-2 Source 02/11/2021 Nasal Swab   Final   • SARS-CoV-2 by PCR 02/11/2021 NotDetected   Final    Comment: PATIENTS: Important information regarding your results and instructions can  be found at [email or web address]   \"After your  Covid-19 Test\"  [facility] providers: PLEASE REFER TO " DE-ESCALATION AND RETESTING PROTOCOL  on [email or web address]  **The TaqPath COVID-19 SARS-CoV-2 test has been made available for use under  the Emergency Use Authorization (EUA) only.         Lipids:   Lab Results   Component Value Date/Time    CHOLSTRLTOT 210 (H) 05/20/2020 10:32 AM    TRIGLYCERIDE 52 05/20/2020 10:32 AM    HDL 83 05/20/2020 10:32 AM     (H) 05/20/2020 10:32 AM       Imaging: No results found.    A/P  · Schedule apt prior to running out of percocet for controlled substance agreement and uds.   · UA consistent with infection will treat with Bactrim. Risk of severe diarrhea, arhythmia, allergic reaction among other possible reactions discussed. Please always use package inserts with any prescription. Patient and or family expresses understanding  · If symptoms continue after treatment of UTI she will continue to [facility] and follow-up with me for treatment  No follow-ups on file.    Problem List Items Addressed This Visit     None      Visit Diagnoses     Intertrigo        Relevant Medications    nystatin (MYCOSTATIN) 731464 UNIT/GM Cream topical cream    Nocturia        Relevant Orders    POCT Urinalysis (Completed)    URINE CULTURE    Urine retention        Relevant Orders    US-BLADDER    URINE CULTURE    Acute cystitis with hematuria        Cervical cancer screening        Relevant Orders    THINPREP PAP WITH HPV          Portions of this note may be dictated using [employer] voice recognition software.  Variances in spelling and vocabulary are possible and unintentional.  Not all areas may be caught/corrected.  Please notify me if any discrepancies are noted or if the meaning of any statement is not correct/clear.

## 2021-07-08 LAB
CYTOLOGY REG CYTOL: NORMAL
HPV HR 12 DNA CVX QL NAA+PROBE: NEGATIVE
HPV16 DNA SPEC QL NAA+PROBE: NEGATIVE
HPV18 DNA SPEC QL NAA+PROBE: NEGATIVE
SPECIMEN SOURCE: NORMAL

## 2021-07-09 LAB
BACTERIA UR CULT: ABNORMAL
BACTERIA UR CULT: ABNORMAL
SIGNIFICANT IND 70042: ABNORMAL
SITE SITE: ABNORMAL
SOURCE SOURCE: ABNORMAL

## 2021-08-16 ENCOUNTER — PATIENT MESSAGE (OUTPATIENT)
Dept: HEALTH INFORMATION MANAGEMENT | Facility: OTHER | Age: 66
End: 2021-08-16

## 2021-08-23 ENCOUNTER — PATIENT MESSAGE (OUTPATIENT)
Dept: MEDICAL GROUP | Facility: IMAGING CENTER | Age: 66
End: 2021-08-23

## 2021-08-23 DIAGNOSIS — M18.0 PRIMARY ARTHROSIS OF FIRST CARPOMETACARPAL JOINTS, BILATERAL: ICD-10-CM

## 2021-08-23 NOTE — TELEPHONE ENCOUNTER
From: Dorcas Michael  To: Physician Theresa Villanueva  Sent: 8/23/2021 1:45 PM PDT  Subject: Celecoxib    I am almost out of my Celecoxib and it appears it was not called into my pharmacy following my last visit with Dr. Villanueva on July 6th this year. The pharmacy was going to fax your office today and I wanted to make certain they did so. If not could you please send [facility] [address] [phone]. Thank you.    Dorcas Michael   1955

## 2021-08-24 RX ORDER — CELECOXIB 200 MG/1
200 CAPSULE ORAL
Qty: 90 CAPSULE | Refills: 0 | Status: SHIPPED | OUTPATIENT
Start: 2021-08-24 | End: 2021-11-17

## 2021-08-26 ENCOUNTER — HOSPITAL ENCOUNTER (OUTPATIENT)
Facility: MEDICAL CENTER | Age: 66
End: 2021-08-26
Attending: PATHOLOGY
Payer: COMMERCIAL

## 2021-08-26 DIAGNOSIS — Z00.6 RESEARCH STUDY PATIENT: ICD-10-CM

## 2021-09-02 LAB
ELF SCORE: 10.1 1
RELATIVE RISK: ABNORMAL
RISK GROUP: ABNORMAL
RISK: 23.6 %

## 2021-09-17 ENCOUNTER — OFFICE VISIT (OUTPATIENT)
Dept: URGENT CARE | Facility: CLINIC | Age: 66
End: 2021-09-17
Payer: MEDICARE

## 2021-09-17 ENCOUNTER — HOSPITAL ENCOUNTER (OUTPATIENT)
Facility: MEDICAL CENTER | Age: 66
End: 2021-09-17
Attending: PHYSICIAN ASSISTANT
Payer: MEDICARE

## 2021-09-17 VITALS
TEMPERATURE: 97.1 F | SYSTOLIC BLOOD PRESSURE: 106 MMHG | WEIGHT: 249 LBS | OXYGEN SATURATION: 98 % | DIASTOLIC BLOOD PRESSURE: 58 MMHG | RESPIRATION RATE: 18 BRPM | HEIGHT: 70 IN | BODY MASS INDEX: 35.65 KG/M2 | HEART RATE: 68 BPM

## 2021-09-17 DIAGNOSIS — R35.0 URINARY FREQUENCY: ICD-10-CM

## 2021-09-17 DIAGNOSIS — N30.00 ACUTE CYSTITIS WITHOUT HEMATURIA: ICD-10-CM

## 2021-09-17 DIAGNOSIS — R30.0 DYSURIA: ICD-10-CM

## 2021-09-17 LAB
APPEARANCE UR: NORMAL
BILIRUB UR STRIP-MCNC: NORMAL MG/DL
COLOR UR AUTO: YELLOW
GLUCOSE UR STRIP.AUTO-MCNC: NORMAL MG/DL
KETONES UR STRIP.AUTO-MCNC: NORMAL MG/DL
LEUKOCYTE ESTERASE UR QL STRIP.AUTO: NORMAL
NITRITE UR QL STRIP.AUTO: NORMAL
PH UR STRIP.AUTO: 6.5 [PH] (ref 5–8)
PROT UR QL STRIP: NORMAL MG/DL
RBC UR QL AUTO: NORMAL
SP GR UR STRIP.AUTO: 1.02
UROBILINOGEN UR STRIP-MCNC: 0.2 MG/DL

## 2021-09-17 PROCEDURE — 87077 CULTURE AEROBIC IDENTIFY: CPT

## 2021-09-17 PROCEDURE — 81002 URINALYSIS NONAUTO W/O SCOPE: CPT | Performed by: PHYSICIAN ASSISTANT

## 2021-09-17 PROCEDURE — 87086 URINE CULTURE/COLONY COUNT: CPT

## 2021-09-17 PROCEDURE — 99213 OFFICE O/P EST LOW 20 MIN: CPT | Performed by: PHYSICIAN ASSISTANT

## 2021-09-17 PROCEDURE — 87186 SC STD MICRODIL/AGAR DIL: CPT

## 2021-09-17 RX ORDER — SULFAMETHOXAZOLE AND TRIMETHOPRIM 800; 160 MG/1; MG/1
1 TABLET ORAL 2 TIMES DAILY
Qty: 10 TABLET | Refills: 0 | Status: SHIPPED | OUTPATIENT
Start: 2021-09-17 | End: 2021-09-22

## 2021-09-17 ASSESSMENT — ENCOUNTER SYMPTOMS
NAUSEA: 0
ABDOMINAL PAIN: 0
CHILLS: 0
FLANK PAIN: 0
FEVER: 0

## 2021-09-17 NOTE — PROGRESS NOTES
Subjective     Dorcas Michael is a 66 y.o. female who presents with Urinary Frequency (/retaining urine x2 days )    Medications:    • celecoxib Caps  • diphenhydrAMINE Tabs  • FIBER PO  • nystatin Crea  • sulfamethoxazole-trimethoprim  • VITAMIN B COMPLEX PO  • vitamin D3 Caps    Allergies: Patient has no known allergies.    Problem List: Dorcas Michael does not have any pertinent problems on file.    Surgical History:  Past Surgical History:   Procedure Laterality Date   • VENTRAL HERNIA REPAIR LAPAROSCOPIC  2/16/2021    Procedure: REPAIR, HERNIA, VENTRAL, LAPAROSCOPIC - UMBILICAL;  Surgeon: Lilliana Mejia M.D.;  Location: SURGERY SAME DAY [facility];  Service: General   • HERNIA REPAIR  02/16/2021    Umbilical    • FINGER ARTHROPLASTY Left 8/22/2017    Procedure: FINGER ARTHROPLASTY - CARPAL METACARPAL;  Surgeon: Magnus Anderson M.D.;  Location: [facility];  Service:    • TENDON TRANSFER Left 8/22/2017    Procedure: TENDON TRANSFER - FLEXI CARPI RADIALIS;  Surgeon: Magnus Anderson M.D.;  Location: [facility];  Service:    • CAPSULOTOMY  8/22/2017    Procedure: CAPSULOTOMY - METACARPAL PHALANGEAL JOINT CAPSULODESIS;  Surgeon: Magnus Anderson M.D.;  Location: [facility];  Service:    • FINGER ARTHROPLASTY Right 9/20/2016    Procedure: FINGER ARTHROPLASTY - CARPAL METACARPAL;  Surgeon: Magnus Anderson M.D.;  Location: [facility];  Service:    • TENDON TRANSFER Right 9/20/2016    Procedure: TENDON TRANSFER - FLEXI CARPI RADIALIS;  Surgeon: Magnus Anderson M.D.;  Location: [facility];  Service:    • PIN INSERTION Right 9/20/2016    Procedure: PIN INSERTION - METACARPAL PHALANGEAL JOINT;  Surgeon: Magnus Anderson M.D.;  Location: [facility];  Service:    • KNEE MANIPULATION  3/1/2010    Performed by OSGOOD, PATRICK J at [facility]   • KNEE ARTHROPLASTY TOTAL  11/3/2009    Performed by OSGOOD, PATRICK J at [facility]  "[facility] ORS   • ACHILLES TENDON REP Right 5/2005    right   • KNEE ARTHROPLASTY TOTAL Right 12/2003    right   • KNEE ARTHROSCOPY Right 5/1998    right   • RECTOCELE REPAIR  6/1990    repair cystocele   • KNEE ARTHROTOMY Left 1973    left lateral meniscectomy   • KNEE ARTHROTOMY Right 1971    right lateral meniscectomy       Past Social Hx: Dorcas Michael  reports that she has never smoked. She has never used smokeless tobacco. She reports current alcohol use of about 0.6 - 1.2 oz of alcohol per week. She reports that she does not use drugs.     Past Family Hx:  Dorcas Michael family history includes GI Disease in an other family member; Heart Disease in an other family member; Other in her mother; Thyroid in her daughter and sister.     Problem list, medications, and allergies reviewed by myself today in Epic.          Patient presents with:  Urinary Frequency and sometimes feels she is retaining urine x2 days.  No fever, chills or flank pain.  Feels like typical UTI symptoms.  No other complaints.     Urinary Frequency  This is a new problem. The current episode started in the past 7 days. The problem occurs constantly. The problem has been gradually worsening. Associated symptoms include urinary symptoms. Pertinent negatives include no abdominal pain, chills, fever or nausea. Nothing aggravates the symptoms. She has tried drinking for the symptoms. The treatment provided no relief.       Review of Systems   Constitutional: Negative for chills and fever.   Gastrointestinal: Negative for abdominal pain and nausea.   Genitourinary: Positive for dysuria, frequency and urgency. Negative for flank pain and hematuria.   All other systems reviewed and are negative.             Objective     /58   Pulse 68   Temp 36.2 °C (97.1 °F) (Temporal)   Resp 18   Ht 1.778 m (5' 10\")   Wt 113 kg (249 lb)   SpO2 98%   BMI 35.73 kg/m²      Physical Exam  Vitals and nursing note reviewed.   Constitutional:  "      General: She is not in acute distress.     Appearance: Normal appearance. She is well-developed and normal weight. She is not ill-appearing or toxic-appearing.   HENT:      Head: Normocephalic and atraumatic.      Nose: Nose normal.      Mouth/Throat:      Mouth: Mucous membranes are moist.   Eyes:      Extraocular Movements: Extraocular movements intact.      Conjunctiva/sclera: Conjunctivae normal.      Pupils: Pupils are equal, round, and reactive to light.   Cardiovascular:      Rate and Rhythm: Normal rate and regular rhythm.      Pulses: Normal pulses.      Heart sounds: Normal heart sounds.   Pulmonary:      Effort: Pulmonary effort is normal.      Breath sounds: Normal breath sounds.   Abdominal:      General: Bowel sounds are normal.      Palpations: Abdomen is soft.      Tenderness: There is no guarding or rebound.   Musculoskeletal:         General: Normal range of motion.      Cervical back: Normal range of motion and neck supple.   Skin:     General: Skin is warm and dry.      Capillary Refill: Capillary refill takes less than 2 seconds.   Neurological:      General: No focal deficit present.      Mental Status: She is alert and oriented to person, place, and time.      Gait: Gait normal.   Psychiatric:         Mood and Affect: Mood normal.         Behavior: Behavior is cooperative.            UA results interpreted by me:+ LE, cloudy         Assessment & Plan        1. Acute cystitis without hematuria    - sulfamethoxazole-trimethoprim (BACTRIM DS) 800-160 MG tablet; Take 1 Tablet by mouth 2 times a day for 5 days.  Dispense: 10 Tablet; Refill: 0  - Urine Culture; Future  - POCT Urinalysis    2. Urinary frequency    - sulfamethoxazole-trimethoprim (BACTRIM DS) 800-160 MG tablet; Take 1 Tablet by mouth 2 times a day for 5 days.  Dispense: 10 Tablet; Refill: 0  - Urine Culture; Future  - POCT Urinalysis    3. Dysuria    - sulfamethoxazole-trimethoprim (BACTRIM DS) 800-160 MG tablet; Take 1 Tablet by  mouth 2 times a day for 5 days.  Dispense: 10 Tablet; Refill: 0  - Urine Culture; Future  - POCT Urinalysis          Patient was evaluated in clinic today while wearing appropriate personal protective equipment.      PT to begin prescription medications today as discussed.     Culture sent to lab, will call with any necessary treatment or treatment changes.     PT should follow up with PCP in 1-2 days for re-evaluation if symptoms have not improved.      Discussed red flags and reasons to return to UC or ED.      Pt and/or family verbalized understanding of diagnosis and follow up instructions and was offered informational handout on diagnosis.  PT discharged.

## 2021-09-18 DIAGNOSIS — R35.0 URINARY FREQUENCY: ICD-10-CM

## 2021-09-18 DIAGNOSIS — N30.00 ACUTE CYSTITIS WITHOUT HEMATURIA: ICD-10-CM

## 2021-09-18 DIAGNOSIS — R30.0 DYSURIA: ICD-10-CM

## 2021-11-02 ENCOUNTER — OFFICE VISIT (OUTPATIENT)
Dept: URGENT CARE | Facility: CLINIC | Age: 66
End: 2021-11-02
Payer: MEDICARE

## 2021-11-02 ENCOUNTER — HOSPITAL ENCOUNTER (OUTPATIENT)
Facility: MEDICAL CENTER | Age: 66
End: 2021-11-02
Attending: PHYSICIAN ASSISTANT
Payer: MEDICARE

## 2021-11-02 VITALS
TEMPERATURE: 96.8 F | DIASTOLIC BLOOD PRESSURE: 76 MMHG | WEIGHT: 250 LBS | HEART RATE: 74 BPM | OXYGEN SATURATION: 99 % | BODY MASS INDEX: 35.79 KG/M2 | HEIGHT: 70 IN | RESPIRATION RATE: 18 BRPM | SYSTOLIC BLOOD PRESSURE: 130 MMHG

## 2021-11-02 DIAGNOSIS — N30.01 ACUTE CYSTITIS WITH HEMATURIA: ICD-10-CM

## 2021-11-02 DIAGNOSIS — N39.0 RECURRENT UTI: ICD-10-CM

## 2021-11-02 DIAGNOSIS — R30.0 DYSURIA: ICD-10-CM

## 2021-11-02 LAB
APPEARANCE UR: NORMAL
BILIRUB UR STRIP-MCNC: NORMAL MG/DL
COLOR UR AUTO: NORMAL
GLUCOSE UR STRIP.AUTO-MCNC: NORMAL MG/DL
KETONES UR STRIP.AUTO-MCNC: NORMAL MG/DL
LEUKOCYTE ESTERASE UR QL STRIP.AUTO: NORMAL
NITRITE UR QL STRIP.AUTO: NORMAL
PH UR STRIP.AUTO: 6 [PH] (ref 5–8)
PROT UR QL STRIP: NORMAL MG/DL
RBC UR QL AUTO: NORMAL
SP GR UR STRIP.AUTO: 1.02
UROBILINOGEN UR STRIP-MCNC: 0.2 MG/DL

## 2021-11-02 PROCEDURE — 87086 URINE CULTURE/COLONY COUNT: CPT

## 2021-11-02 PROCEDURE — 87186 SC STD MICRODIL/AGAR DIL: CPT

## 2021-11-02 PROCEDURE — 81002 URINALYSIS NONAUTO W/O SCOPE: CPT | Performed by: PHYSICIAN ASSISTANT

## 2021-11-02 PROCEDURE — 99214 OFFICE O/P EST MOD 30 MIN: CPT | Performed by: PHYSICIAN ASSISTANT

## 2021-11-02 PROCEDURE — 87077 CULTURE AEROBIC IDENTIFY: CPT

## 2021-11-02 RX ORDER — SULFAMETHOXAZOLE AND TRIMETHOPRIM 800; 160 MG/1; MG/1
1 TABLET ORAL EVERY 12 HOURS
Qty: 10 TABLET | Refills: 0 | Status: SHIPPED | OUTPATIENT
Start: 2021-11-02 | End: 2021-11-07

## 2021-11-02 ASSESSMENT — ENCOUNTER SYMPTOMS
FLANK PAIN: 0
VOMITING: 0
SWEATS: 0
CHILLS: 0
NAUSEA: 0

## 2021-11-02 NOTE — PROGRESS NOTES
Subjective:   Dorcas Michael is a 66 y.o. female who presents for Urinary Frequency, Dysuria (x1 week), and Fatigue        Dysuria   This is a new problem. The current episode started in the past 7 days. The problem occurs every urination. The problem has been gradually worsening. The quality of the pain is described as burning. The pain is mild. There has been no fever. There is no history of pyelonephritis. Associated symptoms include frequency and urgency. Pertinent negatives include no chills, discharge, flank pain, hematuria, hesitancy, nausea, sweats or vomiting. Associated symptoms comments: fatigue. She has tried increased fluids for the symptoms. The treatment provided no relief. Her past medical history is significant for recurrent UTIs.     Review of Systems   Constitutional: Negative for chills.   Gastrointestinal: Negative for nausea and vomiting.   Genitourinary: Positive for dysuria, frequency and urgency. Negative for flank pain, hematuria and hesitancy.       PMH:  has a past medical history of Arthritis, Lymphedema (1995), Pain, Pain, and Pain.  MEDS:   Current Outpatient Medications:   •  sulfamethoxazole-trimethoprim (BACTRIM DS) 800-160 MG tablet, Take 1 Tablet by mouth every 12 hours for 5 days., Disp: 10 Tablet, Rfl: 0  •  celecoxib (CELEBREX) 200 MG Cap, Take 1 Capsule by mouth every day., Disp: 90 Capsule, Rfl: 0  •  nystatin (MYCOSTATIN) 705491 UNIT/GM Cream topical cream, APPLY 1 GM TO AFFECTED AREA(S) 2 TIMES A DAY., Disp: 90 g, Rfl: 2  •  B Complex Vitamins (VITAMIN B COMPLEX PO), every day., Disp: , Rfl:   •  Cholecalciferol (VITAMIN D3) 5000 UNITS Cap, Take 1 Cap by mouth every day., Disp: , Rfl:   •  FIBER PO, Take  by mouth every day., Disp: , Rfl:   •  diphenhydrAMINE (BENADRYL) 25 MG Tab, Take 25 mg by mouth every 6 hours as needed for Sleep., Disp: , Rfl:   ALLERGIES: No Known Allergies  SURGHX:   Past Surgical History:   Procedure Laterality Date   • VENTRAL HERNIA REPAIR  LAPAROSCOPIC  2/16/2021    Procedure: REPAIR, HERNIA, VENTRAL, LAPAROSCOPIC - UMBILICAL;  Surgeon: Lilliana Mejia M.D.;  Location: SURGERY SAME DAY [facility];  Service: General   • HERNIA REPAIR  02/16/2021    Umbilical    • FINGER ARTHROPLASTY Left 8/22/2017    Procedure: FINGER ARTHROPLASTY - CARPAL METACARPAL;  Surgeon: Magnus Anderson M.D.;  Location: SURGERY [facility];  Service:    • TENDON TRANSFER Left 8/22/2017    Procedure: TENDON TRANSFER - FLEXI CARPI RADIALIS;  Surgeon: Magnus Anderson M.D.;  Location: [facility];  Service:    • CAPSULOTOMY  8/22/2017    Procedure: CAPSULOTOMY - METACARPAL PHALANGEAL JOINT CAPSULODESIS;  Surgeon: Magnus Anderson M.D.;  Location: [facility];  Service:    • FINGER ARTHROPLASTY Right 9/20/2016    Procedure: FINGER ARTHROPLASTY - CARPAL METACARPAL;  Surgeon: Magnus Anderson M.D.;  Location: [facility];  Service:    • TENDON TRANSFER Right 9/20/2016    Procedure: TENDON TRANSFER - FLEXI CARPI RADIALIS;  Surgeon: Magnus Anderson M.D.;  Location: [facility];  Service:    • PIN INSERTION Right 9/20/2016    Procedure: PIN INSERTION - METACARPAL PHALANGEAL JOINT;  Surgeon: Magnus Anderson M.D.;  Location: [facility];  Service:    • KNEE MANIPULATION  3/1/2010    Performed by OSGOOD, PATRICK J at [facility]   • KNEE ARTHROPLASTY TOTAL  11/3/2009    Performed by OSGOOD, PATRICK J at [facility]   • ACHILLES TENDON REP Right 5/2005    right   • KNEE ARTHROPLASTY TOTAL Right 12/2003    right   • KNEE ARTHROSCOPY Right 5/1998    right   • RECTOCELE REPAIR  6/1990    repair cystocele   • KNEE ARTHROTOMY Left 1973    left lateral meniscectomy   • KNEE ARTHROTOMY Right 1971    right lateral meniscectomy     SOCHX:  reports that she has never smoked. She has never used smokeless tobacco. She reports current alcohol use of about 0.6 - 1.2 oz of alcohol per week. She reports that she  "does not use drugs.  FH: Family history was reviewed, no pertinent findings to report   Objective:   /76   Pulse 74   Temp 36 °C (96.8 °F) (Temporal)   Resp 18   Ht 1.778 m (5' 10\")   Wt 113 kg (250 lb)   SpO2 99%   BMI 35.87 kg/m²   Physical Exam  Vitals reviewed.   Constitutional:       General: She is not in acute distress.     Appearance: Normal appearance. She is well-developed. She is not toxic-appearing.   HENT:      Head: Normocephalic and atraumatic.      Right Ear: External ear normal.      Left Ear: External ear normal.      Nose: Nose normal.   Eyes:      General: Gaze aligned appropriately.   Cardiovascular:      Rate and Rhythm: Normal rate and regular rhythm.      Heart sounds: Normal heart sounds, S1 normal and S2 normal.   Pulmonary:      Effort: Pulmonary effort is normal. No respiratory distress.      Breath sounds: Normal breath sounds. No stridor. No decreased breath sounds, wheezing, rhonchi or rales.   Abdominal:      Tenderness: There is no right CVA tenderness or left CVA tenderness.   Skin:     General: Skin is dry.   Neurological:      Mental Status: She is alert and oriented to person, place, and time.      Comments: CN2-12 grossly intact   Psychiatric:         Speech: Speech normal.         Behavior: Behavior normal.           Assessment/Plan:   1. Acute cystitis with hematuria  - Urine Culture; Future  - sulfamethoxazole-trimethoprim (BACTRIM DS) 800-160 MG tablet; Take 1 Tablet by mouth every 12 hours for 5 days.  Dispense: 10 Tablet; Refill: 0    2. Dysuria  - POCT Urinalysis    3. Recurrent UTI    1. UA and PE consistent with acute cystitis. Pyelonephritis unlikely as pt has no flank pain, CVA tenderness, N/V, F/C.     Drink 8, 8oz glasses of water every day.  If symptoms fail to improve in 48 hours, worsen or you develop fever, flank pain, nausea, vomiting, or other new symptoms return to the clinic immediately or go the ER.    Differential diagnosis, natural history, " supportive care, and indications for immediate follow-up discussed.    2.  Patient has been experiencing frequent urinary tract infections the last 6 months.  Recommend following up with PCP to have the further evaluated.

## 2021-11-10 ENCOUNTER — OFFICE VISIT (OUTPATIENT)
Dept: MEDICAL GROUP | Facility: IMAGING CENTER | Age: 66
End: 2021-11-10
Payer: MEDICARE

## 2021-11-10 VITALS
DIASTOLIC BLOOD PRESSURE: 70 MMHG | RESPIRATION RATE: 16 BRPM | SYSTOLIC BLOOD PRESSURE: 114 MMHG | TEMPERATURE: 98 F | HEIGHT: 70 IN | HEART RATE: 71 BPM | BODY MASS INDEX: 36.08 KG/M2 | OXYGEN SATURATION: 100 % | WEIGHT: 252 LBS

## 2021-11-10 DIAGNOSIS — I89.0 LYMPHEDEMA OF LEFT LOWER EXTREMITY: ICD-10-CM

## 2021-11-10 DIAGNOSIS — M79.651 RIGHT THIGH PAIN: ICD-10-CM

## 2021-11-10 DIAGNOSIS — M18.0 BILATERAL PRIMARY OSTEOARTHRITIS OF FIRST CARPOMETACARPAL JOINTS: ICD-10-CM

## 2021-11-10 DIAGNOSIS — N39.0 RECURRENT UTI: ICD-10-CM

## 2021-11-10 PROCEDURE — 99214 OFFICE O/P EST MOD 30 MIN: CPT | Performed by: CLINICAL NURSE SPECIALIST

## 2021-11-10 RX ORDER — OXYCODONE HYDROCHLORIDE AND ACETAMINOPHEN 5; 325 MG/1; MG/1
1 TABLET ORAL EVERY 4 HOURS PRN
Qty: 15 TABLET | Refills: 0 | Status: SHIPPED | OUTPATIENT
Start: 2021-11-10 | End: 2021-12-21 | Stop reason: SDUPTHER

## 2021-11-10 ASSESSMENT — PAIN SCALES - GENERAL: PAINLEVEL: 6=MODERATE PAIN

## 2021-11-10 NOTE — ASSESSMENT & PLAN NOTE
Dorcas has osteoarthritis in her hands and also reports multiple areas of pain throughout her body.  The pain is well controlled with Celebrex and Percocet.

## 2021-11-10 NOTE — ASSESSMENT & PLAN NOTE
Dorcas has had multiple UTIs in the last year.  Previously she has not had this issue.  She has a history of bladder repair many years ago and has had good urination, but feels that sometimes her bladder may not be emptying completely.  She is concerned that she has had some any UTIs.  She just recently completed antibiotics for this issue.  She drinks cranberry juice without sugar.  She was previously ordered an ultrasound of the bladder which she will complete.  She will take 1 bottle of probiotics as well.  She is to return to care once she has completed her imaging.

## 2021-11-10 NOTE — ASSESSMENT & PLAN NOTE
This is a chronic problem. Dorcas has lymphedema of her left leg which is it a 2+ today.  This leg occasionally becomes painful.  She has tried acupuncture for this issue.  The pain is well controlled with Celebrex and Percocet.  Percocet prescription ordered today.

## 2021-11-10 NOTE — ASSESSMENT & PLAN NOTE
This is a chronic condition.  Right thigh pain returned after 7 years of resolution. She has pain with sitting and using stairs.  Tenderness over right greater trochanter and L5-S1.  No deformities felt.  No trauma.  Right hip and low back xray ordered as her symptoms may also be representative of a sciatic issue.  She was given a prescription for Percocet as she has used this for many years judiciously and it controls her multiple pains well.  See other problems.  Acupuncture and chiropractic were suggested as alternative treatments to help with pain reduction.

## 2021-11-10 NOTE — PROGRESS NOTES
Subjective     Dorcas Michael is a 66 y.o. female who presents with UTI (3 times in the last 4 months) and Bursitis (right hip, refill on medication)            HPI    Recurrent UTI's.  Most recent last week and completed antibiotics with symptom resolution.  No other changes in urination. No vaginal dryness.  She drinks cranberry juice without sugar.  She had a bladder lift in 1990 and has been great until recently with multiple episodes of UTI.  Ultrasound bladder previously ordered.      Chronic bursitis. Diagnosed 8 years ago. Steroid shot given and resolved for 7 years. One year ago the pain returned.  Pain on right side of thigh, aching and burning, not sharp, continuous. Worse sitting or using stairs.  Pain does not wake at night.  Seems to affect her knee.  She has not tried acupuncture or chiropractic treatment.     Lymphedema.  Chronic condition of left leg.  She experiences intermittent pain and difficulty wearing shoes.    Arthritis of carpometacarpal joints. Dorcas has chronic pain in multiple areas of her body including her hands. Pain is well controlled with occasional Percocet and celebrex.     ROS  See HPI     No Known Allergies    Current Outpatient Medications on File Prior to Visit   Medication Sig Dispense Refill   • celecoxib (CELEBREX) 200 MG Cap Take 1 Capsule by mouth every day. 90 Capsule 0   • nystatin (MYCOSTATIN) 177959 UNIT/GM Cream topical cream APPLY 1 GM TO AFFECTED AREA(S) 2 TIMES A DAY. 90 g 2   • B Complex Vitamins (VITAMIN B COMPLEX PO) every day.     • Cholecalciferol (VITAMIN D3) 5000 UNITS Cap Take 1 Cap by mouth every day.     • FIBER PO Take  by mouth every day.     • diphenhydrAMINE (BENADRYL) 25 MG Tab Take 25 mg by mouth every 6 hours as needed for Sleep.       No current facility-administered medications on file prior to visit.         Objective     /70 (BP Location: Left arm, Patient Position: Sitting, BP Cuff Size: Adult)   Pulse 71   Temp 36.7 °C (98 °F)  "(Temporal)   Resp 16   Ht 1.778 m (5' 10\")   Wt 114 kg (252 lb)   SpO2 100%   BMI 36.16 kg/m²      Physical Exam  Constitutional:       General: She is not in acute distress.     Appearance: Normal appearance. She is not ill-appearing, toxic-appearing or diaphoretic.   HENT:      Head: Normocephalic and atraumatic.   Eyes:      Pupils: Pupils are equal, round, and reactive to light.   Cardiovascular:      Rate and Rhythm: Normal rate and regular rhythm.      Heart sounds: Normal heart sounds.   Pulmonary:      Effort: Pulmonary effort is normal.      Breath sounds: Normal breath sounds.   Musculoskeletal:         General: Tenderness (right greater trochanter and L5/S1 with palpation, no tenderness over right thigh) present.      Right lower leg: Edema (trace) present.      Left lower leg: Edema (2+ pitting) present.   Skin:     General: Skin is warm and dry.      Coloration: Skin is not jaundiced.      Findings: No bruising, erythema or rash.   Neurological:      Mental Status: She is alert and oriented to person, place, and time.      Gait: Gait normal.   Psychiatric:         Mood and Affect: Mood normal.         Behavior: Behavior normal.         Thought Content: Thought content normal.         Judgment: Judgment normal.               MRA Pelvis 11/2019   IMPRESSION:     1.  No significant venous compression to suggest May Thurner syndrome.     2.  Diverticulosis.     3.  Small umbilical hernia containing fat.       DEXA 2019  IMPRESSION:     According to the World Health Organization classification, bone mineral density of this patient is normal in the lumbar spine and normal in the left femur. Percentage increase in bone mineral density in the lumbar region is statistically significant.           10-year Probability of Fracture:  Major Osteoporotic     6.0%  Hip     0.2%         Assessment & Plan        Problem List Items Addressed This Visit     Bilateral primary osteoarthritis of first carpometacarpal " joints     Dorcas has osteoarthritis in her hands and also reports multiple areas of pain throughout her body.  The pain is well controlled with Celebrex and Percocet.         Relevant Medications    oxyCODONE-acetaminophen (PERCOCET) 5-325 MG Tab    Other Relevant Orders    Consent for Opiate Prescription    Lymphedema of left lower extremity     This is a chronic problem. Dorcas has lymphedema of her left leg which is it a 2+ today.  This leg occasionally becomes painful.  She has tried acupuncture for this issue.  The pain is well controlled with Celebrex and Percocet.  Percocet prescription ordered today.           Relevant Medications    oxyCODONE-acetaminophen (PERCOCET) 5-325 MG Tab    Other Relevant Orders    Consent for Opiate Prescription    Recurrent UTI     Dorcas has had multiple UTIs in the last year.  Previously she has not had this issue.  She has a history of bladder repair many years ago and has had good urination, but feels that sometimes her bladder may not be emptying completely.  She is concerned that she has had some any UTIs.  She just recently completed antibiotics for this issue.  She drinks cranberry juice without sugar.  She was previously ordered an ultrasound of the bladder which she will complete.  She will take 1 bottle of probiotics as well.  She is to return to care once she has completed her imaging.         Right thigh pain     This is a chronic condition.  Right thigh pain returned after 7 years of resolution. She has pain with sitting and using stairs.  Tenderness over right greater trochanter and L5-S1.  No deformities felt.  No trauma.  Right hip and low back xray ordered as her symptoms may also be representative of a sciatic issue.  She was given a prescription for Percocet as she has used this for many years judiciously and it controls her multiple pains well.  See other problems.  Acupuncture and chiropractic were suggested as alternative treatments to help with pain  reduction.         Relevant Medications    oxyCODONE-acetaminophen (PERCOCET) 5-325 MG Tab    Other Relevant Orders    DX-HIP-UNILATERAL-WITH PELVIS-1 VIEW RIGHT    DX-LUMBAR SPINE-2 OR 3 VIEWS    Consent for Opiate Prescription                Return if symptoms worsen or fail to improve, for With test results.

## 2021-11-11 ENCOUNTER — HOSPITAL ENCOUNTER (OUTPATIENT)
Dept: RADIOLOGY | Facility: MEDICAL CENTER | Age: 66
End: 2021-11-11
Attending: FAMILY MEDICINE
Payer: MEDICARE

## 2021-11-11 ENCOUNTER — HOSPITAL ENCOUNTER (OUTPATIENT)
Dept: RADIOLOGY | Facility: MEDICAL CENTER | Age: 66
End: 2021-11-11
Attending: CLINICAL NURSE SPECIALIST
Payer: MEDICARE

## 2021-11-11 DIAGNOSIS — M79.651 RIGHT THIGH PAIN: ICD-10-CM

## 2021-11-11 DIAGNOSIS — R33.9 URINE RETENTION: ICD-10-CM

## 2021-11-11 PROCEDURE — 76857 US EXAM PELVIC LIMITED: CPT

## 2021-11-11 PROCEDURE — 73501 X-RAY EXAM HIP UNI 1 VIEW: CPT | Mod: RT

## 2021-11-11 PROCEDURE — 72100 X-RAY EXAM L-S SPINE 2/3 VWS: CPT

## 2021-11-14 DIAGNOSIS — M18.0 PRIMARY ARTHROSIS OF FIRST CARPOMETACARPAL JOINTS, BILATERAL: ICD-10-CM

## 2021-11-17 RX ORDER — CELECOXIB 200 MG/1
200 CAPSULE ORAL
Qty: 90 CAPSULE | Refills: 0 | Status: SHIPPED | OUTPATIENT
Start: 2021-11-17 | End: 2022-02-28

## 2021-12-20 SDOH — ECONOMIC STABILITY: HOUSING INSECURITY
IN THE LAST 12 MONTHS, WAS THERE A TIME WHEN YOU DID NOT HAVE A STEADY PLACE TO SLEEP OR SLEPT IN A SHELTER (INCLUDING NOW)?: NO

## 2021-12-20 SDOH — ECONOMIC STABILITY: FOOD INSECURITY: WITHIN THE PAST 12 MONTHS, THE FOOD YOU BOUGHT JUST DIDN'T LAST AND YOU DIDN'T HAVE MONEY TO GET MORE.: NEVER TRUE

## 2021-12-20 SDOH — ECONOMIC STABILITY: INCOME INSECURITY: IN THE LAST 12 MONTHS, WAS THERE A TIME WHEN YOU WERE NOT ABLE TO PAY THE MORTGAGE OR RENT ON TIME?: NO

## 2021-12-20 SDOH — HEALTH STABILITY: PHYSICAL HEALTH: ON AVERAGE, HOW MANY DAYS PER WEEK DO YOU ENGAGE IN MODERATE TO STRENUOUS EXERCISE (LIKE A BRISK WALK)?: 7 DAYS

## 2021-12-20 SDOH — ECONOMIC STABILITY: TRANSPORTATION INSECURITY
IN THE PAST 12 MONTHS, HAS LACK OF RELIABLE TRANSPORTATION KEPT YOU FROM MEDICAL APPOINTMENTS, MEETINGS, WORK OR FROM GETTING THINGS NEEDED FOR DAILY LIVING?: NO

## 2021-12-20 SDOH — HEALTH STABILITY: PHYSICAL HEALTH: ON AVERAGE, HOW MANY MINUTES DO YOU ENGAGE IN EXERCISE AT THIS LEVEL?: 30 MIN

## 2021-12-20 SDOH — ECONOMIC STABILITY: HOUSING INSECURITY: IN THE LAST 12 MONTHS, HOW MANY PLACES HAVE YOU LIVED?: 1

## 2021-12-20 SDOH — ECONOMIC STABILITY: FOOD INSECURITY: WITHIN THE PAST 12 MONTHS, YOU WORRIED THAT YOUR FOOD WOULD RUN OUT BEFORE YOU GOT MONEY TO BUY MORE.: NEVER TRUE

## 2021-12-20 SDOH — ECONOMIC STABILITY: INCOME INSECURITY: HOW HARD IS IT FOR YOU TO PAY FOR THE VERY BASICS LIKE FOOD, HOUSING, MEDICAL CARE, AND HEATING?: NOT HARD AT ALL

## 2021-12-20 SDOH — ECONOMIC STABILITY: TRANSPORTATION INSECURITY
IN THE PAST 12 MONTHS, HAS LACK OF TRANSPORTATION KEPT YOU FROM MEETINGS, WORK, OR FROM GETTING THINGS NEEDED FOR DAILY LIVING?: NO

## 2021-12-20 SDOH — HEALTH STABILITY: MENTAL HEALTH
STRESS IS WHEN SOMEONE FEELS TENSE, NERVOUS, ANXIOUS, OR CAN'T SLEEP AT NIGHT BECAUSE THEIR MIND IS TROUBLED. HOW STRESSED ARE YOU?: NOT AT ALL

## 2021-12-20 SDOH — ECONOMIC STABILITY: TRANSPORTATION INSECURITY
IN THE PAST 12 MONTHS, HAS THE LACK OF TRANSPORTATION KEPT YOU FROM MEDICAL APPOINTMENTS OR FROM GETTING MEDICATIONS?: NO

## 2021-12-20 ASSESSMENT — LIFESTYLE VARIABLES
HOW OFTEN DO YOU HAVE A DRINK CONTAINING ALCOHOL: 4 OR MORE TIMES A WEEK
HOW OFTEN DO YOU HAVE SIX OR MORE DRINKS ON ONE OCCASION: LESS THAN MONTHLY
HOW MANY STANDARD DRINKS CONTAINING ALCOHOL DO YOU HAVE ON A TYPICAL DAY: 1 OR 2

## 2021-12-20 ASSESSMENT — SOCIAL DETERMINANTS OF HEALTH (SDOH)
HOW OFTEN DO YOU ATTEND CHURCH OR RELIGIOUS SERVICES?: NEVER
HOW HARD IS IT FOR YOU TO PAY FOR THE VERY BASICS LIKE FOOD, HOUSING, MEDICAL CARE, AND HEATING?: NOT HARD AT ALL
IN A TYPICAL WEEK, HOW MANY TIMES DO YOU TALK ON THE PHONE WITH FAMILY, FRIENDS, OR NEIGHBORS?: MORE THAN THREE TIMES A WEEK
WITHIN THE PAST 12 MONTHS, YOU WORRIED THAT YOUR FOOD WOULD RUN OUT BEFORE YOU GOT THE MONEY TO BUY MORE: NEVER TRUE
HOW OFTEN DO YOU ATTEND CHURCH OR RELIGIOUS SERVICES?: NEVER
DO YOU BELONG TO ANY CLUBS OR ORGANIZATIONS SUCH AS CHURCH GROUPS UNIONS, FRATERNAL OR ATHLETIC GROUPS, OR SCHOOL GROUPS?: YES
HOW OFTEN DO YOU GET TOGETHER WITH FRIENDS OR RELATIVES?: TWICE A WEEK
DO YOU BELONG TO ANY CLUBS OR ORGANIZATIONS SUCH AS CHURCH GROUPS UNIONS, FRATERNAL OR ATHLETIC GROUPS, OR SCHOOL GROUPS?: YES
HOW OFTEN DO YOU ATTENT MEETINGS OF THE CLUB OR ORGANIZATION YOU BELONG TO?: MORE THAN 4 TIMES PER YEAR
HOW OFTEN DO YOU HAVE A DRINK CONTAINING ALCOHOL: 4 OR MORE TIMES A WEEK
HOW MANY DRINKS CONTAINING ALCOHOL DO YOU HAVE ON A TYPICAL DAY WHEN YOU ARE DRINKING: 1 OR 2
IN A TYPICAL WEEK, HOW MANY TIMES DO YOU TALK ON THE PHONE WITH FAMILY, FRIENDS, OR NEIGHBORS?: MORE THAN THREE TIMES A WEEK
HOW OFTEN DO YOU HAVE SIX OR MORE DRINKS ON ONE OCCASION: LESS THAN MONTHLY
HOW OFTEN DO YOU GET TOGETHER WITH FRIENDS OR RELATIVES?: TWICE A WEEK
HOW OFTEN DO YOU ATTENT MEETINGS OF THE CLUB OR ORGANIZATION YOU BELONG TO?: MORE THAN 4 TIMES PER YEAR

## 2021-12-21 ENCOUNTER — OFFICE VISIT (OUTPATIENT)
Dept: MEDICAL GROUP | Facility: IMAGING CENTER | Age: 66
End: 2021-12-21
Payer: MEDICARE

## 2021-12-21 VITALS
WEIGHT: 253 LBS | BODY MASS INDEX: 37.47 KG/M2 | HEIGHT: 69 IN | SYSTOLIC BLOOD PRESSURE: 116 MMHG | DIASTOLIC BLOOD PRESSURE: 74 MMHG | TEMPERATURE: 97.2 F | HEART RATE: 66 BPM | RESPIRATION RATE: 12 BRPM | OXYGEN SATURATION: 100 %

## 2021-12-21 DIAGNOSIS — G89.29 CHRONIC PAIN OF RIGHT KNEE: ICD-10-CM

## 2021-12-21 DIAGNOSIS — M25.561 CHRONIC PAIN OF RIGHT KNEE: ICD-10-CM

## 2021-12-21 DIAGNOSIS — Z13.0 SCREENING FOR DEFICIENCY ANEMIA: ICD-10-CM

## 2021-12-21 DIAGNOSIS — M18.0 BILATERAL PRIMARY OSTEOARTHRITIS OF FIRST CARPOMETACARPAL JOINTS: ICD-10-CM

## 2021-12-21 DIAGNOSIS — Z12.31 BREAST CANCER SCREENING BY MAMMOGRAM: ICD-10-CM

## 2021-12-21 DIAGNOSIS — I89.0 LYMPHEDEMA OF LEFT LOWER EXTREMITY: ICD-10-CM

## 2021-12-21 DIAGNOSIS — M79.651 RIGHT THIGH PAIN: ICD-10-CM

## 2021-12-21 DIAGNOSIS — G89.29 CHRONIC RIGHT SHOULDER PAIN: ICD-10-CM

## 2021-12-21 DIAGNOSIS — Z23 NEED FOR VACCINATION: ICD-10-CM

## 2021-12-21 DIAGNOSIS — M25.511 CHRONIC RIGHT SHOULDER PAIN: ICD-10-CM

## 2021-12-21 DIAGNOSIS — Z13.1 SCREENING FOR DIABETES MELLITUS (DM): ICD-10-CM

## 2021-12-21 DIAGNOSIS — Z13.220 SCREENING FOR HYPERLIPIDEMIA: ICD-10-CM

## 2021-12-21 DIAGNOSIS — M25.551 RIGHT HIP PAIN: ICD-10-CM

## 2021-12-21 PROCEDURE — 90471 IMMUNIZATION ADMIN: CPT | Performed by: FAMILY MEDICINE

## 2021-12-21 PROCEDURE — 90750 HZV VACC RECOMBINANT IM: CPT | Performed by: FAMILY MEDICINE

## 2021-12-21 PROCEDURE — 99213 OFFICE O/P EST LOW 20 MIN: CPT | Mod: 25 | Performed by: FAMILY MEDICINE

## 2021-12-21 RX ORDER — OXYCODONE HYDROCHLORIDE AND ACETAMINOPHEN 5; 325 MG/1; MG/1
1 TABLET ORAL
Qty: 30 TABLET | Refills: 0 | Status: SHIPPED | OUTPATIENT
Start: 2021-12-21 | End: 2022-01-20

## 2021-12-21 ASSESSMENT — PATIENT HEALTH QUESTIONNAIRE - PHQ9: CLINICAL INTERPRETATION OF PHQ2 SCORE: 0

## 2021-12-21 ASSESSMENT — PAIN SCALES - GENERAL: PAINLEVEL: 4=SLIGHT-MODERATE PAIN

## 2021-12-21 ASSESSMENT — ENCOUNTER SYMPTOMS: GENERAL WELL-BEING: EXCELLENT

## 2021-12-21 ASSESSMENT — ACTIVITIES OF DAILY LIVING (ADL): BATHING_REQUIRES_ASSISTANCE: 0

## 2021-12-21 NOTE — PROGRESS NOTES
Annual Health Assessment Questions:    1.  Are you currently engaging in any exercise or physical activity? Yes    2.  How would you describe your mood or emotional well-being today? good    3.  Have you had any falls in the last year? Yes    4.  Have you noticed any problems with your balance or had difficulty walking? No    5.  In the last six months have you experienced any leakage of urine? No    6. DPA/Advanced Directive: Patient does not have an Advanced Directive.  A packet and workshop information was given on Advanced Directives.     Chief Complaint   Patient presents with   • Annual Exam       HPI:  Dorcas is a 66 y.o. here for Medicare Annual Wellness Visit    Fall tripped over dog. No associated symptoms.     Patient Active Problem List    Diagnosis Date Noted   • Right thigh pain 11/10/2021   • Recurrent UTI 11/10/2021   • Umbilical hernia without obstruction and without gangrene 05/20/2020   • Achilles tendinitis of left lower extremity 05/20/2020   • Primary arthrosis of first carpometacarpal joints, bilateral 08/22/2017   • Obesity (BMI 30-39.9) 06/29/2017   • Bilateral primary osteoarthritis of first carpometacarpal joints 09/20/2016   • Lymphedema of left lower extremity 06/09/2016   • History of bilateral knee replacement 06/09/2016       Current Outpatient Medications   Medication Sig Dispense Refill   • oxyCODONE-acetaminophen (PERCOCET) 5-325 MG Tab Take 1 Tablet by mouth 1 time a day as needed for up to 30 days. 30 Tablet 0   • celecoxib (CELEBREX) 200 MG Cap TAKE 1 CAPSULE BY MOUTH EVERY DAY 90 Capsule 0   • nystatin (MYCOSTATIN) 609922 UNIT/GM Cream topical cream APPLY 1 GM TO AFFECTED AREA(S) 2 TIMES A DAY. 90 g 2   • B Complex Vitamins (VITAMIN B COMPLEX PO) every day.     • Cholecalciferol (VITAMIN D3) 5000 UNITS Cap Take 1 Cap by mouth every day.     • FIBER PO Take  by mouth every day.     • diphenhydrAMINE (BENADRYL) 25 MG Tab Take 25 mg by mouth every 6 hours as needed for Sleep.        No current facility-administered medications for this visit.        Patient is taking medications as noted in medication list.  Current supplements as per medication list.     Allergies: Patient has no known allergies.    Current social contact/activities: yes     Is patient current with immunizations? No, due for SHINGRIX (Shingles). Patient is interested in receiving SHINGRIX (Shingles) today.    She  reports that she has never smoked. She has never used smokeless tobacco. She reports current alcohol use of about 0.6 - 1.2 oz of alcohol per week. She reports that she does not use drugs.  Counseling given: Not Answered      DPA/Advanced directive: Patient does not have an Advanced Directive.  A packet and workshop information was given on Advanced Directives.    ROS:    Gait: Uses no assistive device   Ostomy: No   Other tubes: No   Amputations: No   Chronic oxygen use No   Last eye exam 12/2020   Wears hearing aids: No   : Denies any urinary leakage during the last 6 months      Screening:  See orders  Depression Screening  Little interest or pleasure in doing things?  0 - not at all  Feeling down, depressed, or hopeless? 0 - not at all  Trouble falling or staying asleep, or sleeping too much?     Feeling tired or having little energy?     Poor appetite or overeating?     Feeling bad about yourself - or that you are a failure or have let yourself or your family down?    Trouble concentrating on things, such as reading the newspaper or watching television?    Moving or speaking so slowly that other people could have noticed.  Or the opposite - being so fidgety or restless that you have been moving around a lot more than usual?     Thoughts that you would be better off dead, or of hurting yourself?     Patient Health Questionnaire Score:      If depressive symptoms identified deferred to follow up visit unless specifically addressed in assessment and plan.    Interpretation of PHQ-9 Total Score   Score  Severity   1-4 No Depression   5-9 Mild Depression   10-14 Moderate Depression   15-19 Moderately Severe Depression   20-27 Severe Depression      Screening for Cognitive Impairment  Three Minute Recall (captain, faiza, picture)  1/3    Draw clock face with all 12 numbers and set the hands to show 5 past 8.  Yes    If cognitive concerns identified, deferred for follow up unless specifically addressed in assessment and plan.    Fall Risk Assessment  Has the patient had two or more falls in the last year or any fall with injury in the last year?  Yes  If fall risk identified, deferred for follow up unless specifically addressed in assessment and plan.    Safety Assessment  Throw rugs on floor.  No  Handrails on all stairs.  Yes  Good lighting in all hallways.  Yes  Difficulty hearing.  No  Patient counseled about all safety risks that were identified.    Functional Assessment ADLs  Are there any barriers preventing you from cooking for yourself or meeting nutritional needs?  No.    Are there any barriers preventing you from driving safely or obtaining transportation?  No.    Are there any barriers preventing you from using a telephone or calling for help?  No.    Are there any barriers preventing you from shopping?  No.    Are there any barriers preventing you from taking care of your own finances?  No.    Are there any barriers preventing you from managing your medications?    No.    Are there any barriers preventing you from showering, bathing or dressing yourself?  No.    Are you currently engaging in any exercise or physical activity?  Yes.     What is your perception of your health?  Excellent.    Health Maintenance Summary          Overdue - IMM INFLUENZA (1) Overdue - never done    No completion history exists for this topic.          Ordered - MAMMOGRAM (Yearly) Ordered on 12/21/2021 12/21/2020  MA-SCREENING MAMMO BILAT W/TOMOSYNTHESIS W/CAD    09/24/2019  MA-SCREENING MAMMO BILAT W/TOMOSYNTHESIS W/CAD     03/29/2018  MA-MAMMO SCREENING BILAT W/CRISTINA W/CAD    09/21/2016  MA-DIAGNOSTIC DIGITAL MAMMO-UNILAT LEFT    09/15/2016  MA-SCREEN MAMMO W/CAD-BILAT    Only the first 5 history entries have been loaded, but more history exists.          IMM ZOSTER VACCINES (3 of 3) Next due on 2/15/2022    12/21/2021  Imm Admin: Zoster Vaccine Recombinant (RZV) (SHINGRIX)    10/01/2014  Imm Admin: Zoster Vaccine Live (ZVL) (Zostavax) - HISTORICAL DATA          BONE DENSITY (Every 5 Years) Tentatively due on 9/24/2024 09/24/2019  DS-BONE DENSITY STUDY (DEXA)    06/25/2013  DS-BONE DENSITY STUDY (DEXA)          COLORECTAL CANCER SCREENING (COLONOSCOPY - Every 5 Years) Tentatively due on 1/20/2026 01/20/2021  REFERRAL TO GI FOR COLONOSCOPY    07/31/2015  REFERRAL TO GI FOR COLONOSCOPY    02/24/2005  REFERRAL TO GI FOR COLONOSCOPY          PAP SMEAR (Every 5 Years) Tentatively due on 7/6/2026 07/06/2021  THINPREP PAP WITH HPV    07/06/2021  Pathology Gynecology Specimen    09/13/2016  THINPREP PAP WITH HPV    09/13/2016  PATHOLOGY GYN SPECIMEN          IMM DTaP/Tdap/Td Vaccine (2 - Td or Tdap) Next due on 11/14/2027 11/14/2017  Imm Admin: Tdap Vaccine          IMM PNEUMOCOCCAL VACCINE: 65+ Years (Series Information) Completed    11/19/2019  Imm Admin: Pneumococcal polysaccharide vaccine (PPSV-23)          COVID-19 Vaccine (Series Information) Completed    12/13/2021  Imm Admin: Moderna SARS-CoV-2 Vaccine    04/10/2021  Imm Admin: Moderna SARS-CoV-2 Vaccine    03/12/2021  Imm Admin: Moderna SARS-CoV-2 Vaccine          IMM HEP B VACCINE (Series Information) Aged Out    No completion history exists for this topic.          IMM MENINGOCOCCAL VACCINE (MCV4) (Series Information) Aged Out    No completion history exists for this topic.          Discontinued - HEPATITIS C SCREENING  Discontinued    No completion history exists for this topic.                Patient Care Team:  Theresa Villanueva M.D. as PCP - General (Family  Medicine)  Alfonso Henry Ass't (Inactive) as    [place]  as Consulting Physician  Matt Torrez O.D. as Consulting Physician (Optometry)    Social History     Tobacco Use   • Smoking status: Never Smoker   • Smokeless tobacco: Never Used   Vaping Use   • Vaping Use: Never used   Substance Use Topics   • Alcohol use: Yes     Alcohol/week: 0.6 - 1.2 oz     Types: 1 - 2 Glasses of wine per week     Comment: 1-2 per day   • Drug use: No     Family History   Problem Relation Age of Onset   • Heart Disease Other    • Other Mother         MS   • Thyroid Daughter    • GI Disease Other         Crohns   • Thyroid Sister      She  has a past medical history of Arthritis, Lymphedema (1995), Pain, Pain, and Pain.   Past Surgical History:   Procedure Laterality Date   • VENTRAL HERNIA REPAIR LAPAROSCOPIC  2/16/2021    Procedure: REPAIR, HERNIA, VENTRAL, LAPAROSCOPIC - UMBILICAL;  Surgeon: Lilliana Mejia M.D.;  Location: SURGERY SAME DAY [facility];  Service: General   • HERNIA REPAIR  02/16/2021    Umbilical    • FINGER ARTHROPLASTY Left 8/22/2017    Procedure: FINGER ARTHROPLASTY - CARPAL METACARPAL;  Surgeon: Magnus Anderson M.D.;  Location: [facility];  Service:    • TENDON TRANSFER Left 8/22/2017    Procedure: TENDON TRANSFER - FLEXI CARPI RADIALIS;  Surgeon: Magnus Anderson M.D.;  Location: [facility];  Service:    • CAPSULOTOMY  8/22/2017    Procedure: CAPSULOTOMY - METACARPAL PHALANGEAL JOINT CAPSULODESIS;  Surgeon: Magnus Anderson M.D.;  Location: [facility];  Service:    • FINGER ARTHROPLASTY Right 9/20/2016    Procedure: FINGER ARTHROPLASTY - CARPAL METACARPAL;  Surgeon: Magnus Anderson M.D.;  Location: [facility];  Service:    • TENDON TRANSFER Right 9/20/2016    Procedure: TENDON TRANSFER - FLEXI CARPI RADIALIS;  Surgeon: Magnus Anderson M.D.;  Location: [facility];  Service:    • PIN INSERTION Right  "9/20/2016    Procedure: PIN INSERTION - METACARPAL PHALANGEAL JOINT;  Surgeon: Magnus Anderson M.D.;  Location: [facility];  Service:    • KNEE MANIPULATION  3/1/2010    Performed by OSGOOD, PATRICK J at [facility]   • KNEE ARTHROPLASTY TOTAL  11/3/2009    Performed by OSGOOD, PATRICK J at [facility]   • ACHILLES TENDON REP Right 5/2005    right   • KNEE ARTHROPLASTY TOTAL Right 12/2003    right   • KNEE ARTHROSCOPY Right 5/1998    right   • RECTOCELE REPAIR  6/1990    repair cystocele   • KNEE ARTHROTOMY Left 1973    left lateral meniscectomy   • KNEE ARTHROTOMY Right 1971    right lateral meniscectomy         Exam:   /74   Pulse 66   Temp 36.2 °C (97.2 °F)   Resp 12   Ht 1.753 m (5' 9\")   Wt 115 kg (253 lb)   SpO2 100%  Body mass index is 37.36 kg/m².    Hearing excellent.    Dentition good  Alert, oriented in no acute distress.  Eye contact is good, speech goal directed, affect calm      Assessment and Plan. The following treatment and monitoring plan is recommended:    Discussed risks of chronic opiate therapy. Patient is agreeable to see PT and pain management for more long term therapy. Must return for controlled substance refills.   1. Right thigh pain  - oxyCODONE-acetaminophen (PERCOCET) 5-325 MG Tab; Take 1 Tablet by mouth 1 time a day as needed for up to 30 days.  Dispense: 30 Tablet; Refill: 0  - Referral to Pain Management  - Referral to Physical Therapy    2. Lymphedema of left lower extremity  - oxyCODONE-acetaminophen (PERCOCET) 5-325 MG Tab; Take 1 Tablet by mouth 1 time a day as needed for up to 30 days.  Dispense: 30 Tablet; Refill: 0  - Referral to Pain Management  - Referral to Physical Therapy    3. Bilateral primary osteoarthritis of first carpometacarpal joints  - oxyCODONE-acetaminophen (PERCOCET) 5-325 MG Tab; Take 1 Tablet by mouth 1 time a day as needed for up to 30 days.  Dispense: 30 Tablet; Refill: 0    4. Need for vaccination  - " Shingrix Vaccine    5. Chronic pain of right knee  - oxyCODONE-acetaminophen (PERCOCET) 5-325 MG Tab; Take 1 Tablet by mouth 1 time a day as needed for up to 30 days.  Dispense: 30 Tablet; Refill: 0  - Referral to Pain Management  - Referral to Physical Therapy    6. Right hip pain  - oxyCODONE-acetaminophen (PERCOCET) 5-325 MG Tab; Take 1 Tablet by mouth 1 time a day as needed for up to 30 days.  Dispense: 30 Tablet; Refill: 0  - Referral to Pain Management  - Referral to Physical Therapy    7. Chronic right shoulder pain  - oxyCODONE-acetaminophen (PERCOCET) 5-325 MG Tab; Take 1 Tablet by mouth 1 time a day as needed for up to 30 days.  Dispense: 30 Tablet; Refill: 0  - Referral to Pain Management  - Referral to Physical Therapy    8. Screening for hyperlipidemia  - Lipid Profile; Future    9. Screening for deficiency anemia  - CBC WITH DIFFERENTIAL; Future    10. Screening for diabetes mellitus (DM)  - Comp Metabolic Panel; Future    11. Breast cancer screening by mammogram  - MA-SCREENING MAMMO BILAT W/TOMOSYNTHESIS W/CAD; Future       Services suggested: No services needed at this time  Health Care Screening recommendations as per orders if indicated.  Referrals offered: PT/OT/Nutrition counseling/Behavioral Health/Smoking cessation as per orders if indicated.    Discussion today about general wellness and lifestyle habits:    · Prevent falls and reduce trip hazards; Cautioned about securing or removing rugs.  · Have a working fire alarm and carbon monoxide detector;   · Engage in regular physical activity and social activities     Follow-up: No follow-ups on file.

## 2022-01-23 NOTE — PROGRESS NOTES
"New Patient Note    Interventional Pain and Spine  Physiatry (Physical Medicine and Rehabilitation)     Patient Name: Dorcas Michael  : 1955  Date of Service: 2022  PCP: Theresa Villanueva M.D.  Referring Provider: Theresa Villanueva M.D.    Chief Complaint: No chief complaint on file.      HISTORY    HPI:  Dorcas Michael is a 66 y.o. female who presents today with pain that {radiate:00962}. This pain began ***. Her pain at its best-worse level during the course of the day is ***-***/10, respectively. Pain right now is ***/10 on the numeric pain scale. Pain worsens with {painaffectedby:34841} and improves with {painaffectedby:87411}. Her pain {paininterferewithadls:21970}. The patient {suredfla::\"otherwise denies new weakness, numbness, or bladder/bowel incontinence\"} {}    rec for Chronic R shoulder pain, R hip pain, R knee pain, OA of bilateral CMC joints, R thigh pain, RLE lymphedema    Given percocet 5-325mg 1 tab daily PRN by PCP on .    Saw Dr. Villalba for her shoulder who felt she does not need surgical intervention. Then saw Dr. Corcoran at [facility] who recommended PT and performed R SA bursa injection on .     M79.651 (ICD-10-CM) - Right thigh pain   I89.0 (ICD-10-CM) - Lymphedema of left lower extremity   M25.561,G89.29 (ICD-10-CM) - Chronic pain of right knee   M25.551 (ICD-10-CM) - Right hip pain   M25.511,G89.29 (ICD-10-CM) - Chronic right shoulder pain     Discussed risks of chronic opiate therapy. Patient is agreeable to see PT and pain management for more long term therapy. Must return for controlled substance refills.             Hx bilateral TKR, lymphedema,   The patient {suhashn:13815} done physical therapy for this problem, most recently ***.     Patient has tried the following medications with varied success (current meds in bold): ***    Therapeutic modalities and interventional therapies to date include:  -***    Medical history includes ***.    ***ckphq    Medical " records review:  I reviewed the note from the referring provider Theresa Villanueva M.D. including the note dated 12/21/21.    ROS:   Red Flags ROS: ***  Fever, Chills, Sweats: Denies  Involuntary Weight Loss: Denies  Bladder Incontinence: Denies  Bowel Incontinence: denies  Saddle Anesthesia: Denies    All other systems reviewed and negative.     PMHx:   Past Medical History:   Diagnosis Date   • Arthritis     both hands   • Lymphedema 1995    bilateral legs   • Pain     left knee 4/10   • Pain     hands, feet   • Pain     left hand       PSHx:   Past Surgical History:   Procedure Laterality Date   • VENTRAL HERNIA REPAIR LAPAROSCOPIC  2/16/2021    Procedure: REPAIR, HERNIA, VENTRAL, LAPAROSCOPIC - UMBILICAL;  Surgeon: Lilliana Mejia M.D.;  Location: SURGERY SAME DAY [facility];  Service: General   • HERNIA REPAIR  02/16/2021    Umbilical    • FINGER ARTHROPLASTY Left 8/22/2017    Procedure: FINGER ARTHROPLASTY - CARPAL METACARPAL;  Surgeon: Magnus Anderson M.D.;  Location: [facility];  Service:    • TENDON TRANSFER Left 8/22/2017    Procedure: TENDON TRANSFER - FLEXI CARPI RADIALIS;  Surgeon: Magnus Anderson M.D.;  Location: [facility];  Service:    • CAPSULOTOMY  8/22/2017    Procedure: CAPSULOTOMY - METACARPAL PHALANGEAL JOINT CAPSULODESIS;  Surgeon: Magnus Anderson M.D.;  Location: [facility];  Service:    • FINGER ARTHROPLASTY Right 9/20/2016    Procedure: FINGER ARTHROPLASTY - CARPAL METACARPAL;  Surgeon: Magnus Anderson M.D.;  Location: [facility];  Service:    • TENDON TRANSFER Right 9/20/2016    Procedure: TENDON TRANSFER - FLEXI CARPI RADIALIS;  Surgeon: Magnus Anderson M.D.;  Location: [facility];  Service:    • PIN INSERTION Right 9/20/2016    Procedure: PIN INSERTION - METACARPAL PHALANGEAL JOINT;  Surgeon: Magnus Anderson M.D.;  Location: [facility];  Service:    • KNEE MANIPULATION  3/1/2010    Performed by OSGOOD,  CLAYTON HARRINGTON at SURGERY [facility] ORS   • KNEE ARTHROPLASTY TOTAL  11/3/2009    Performed by OSGOOD, PATRICK J at SURGERY [facility] ORS   • ACHILLES TENDON REP Right 5/2005    right   • KNEE ARTHROPLASTY TOTAL Right 12/2003    right   • KNEE ARTHROSCOPY Right 5/1998    right   • RECTOCELE REPAIR  6/1990    repair cystocele   • KNEE ARTHROTOMY Left 1973    left lateral meniscectomy   • KNEE ARTHROTOMY Right 1971    right lateral meniscectomy       Family Hx:   Family History   Problem Relation Age of Onset   • Heart Disease Other    • Other Mother         MS   • Thyroid Daughter    • GI Disease Other         Crohns   • Thyroid Sister        Social Hx:  Social History     Socioeconomic History   • Marital status:      Spouse name: Not on file   • Number of children: Not on file   • Years of education: Not on file   • Highest education level: Associate degree: occupational, technical, or vocational program   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    Tobacco Use   • Smoking status: Never Smoker   • Smokeless tobacco: Never Used   Vaping Use   • Vaping Use: Never used   Substance and Sexual Activity   • Alcohol use: Yes     Alcohol/week: 0.6 - 1.2 oz     Types: 1 - 2 Glasses of wine per week     Comment: 1-2 per day   • Drug use: No   • Sexual activity: Yes     Partners: Male   Other Topics Concern   • Not on file   Social History Narrative   • Not on file     Social Determinants of Health     Financial Resource Strain: Low Risk    • Difficulty of Paying Living Expenses: Not hard at all   Food Insecurity: No Food Insecurity   • Worried About Running Out of Food in the Last Year: Never true   • Ran Out of Food in the Last Year: Never true   Transportation Needs: No Transportation Needs   • Lack of Transportation (Medical): No   • Lack of Transportation (Non-Medical): No   Physical Activity: Sufficiently Active   • Days of Exercise per Week: 7 days   • Minutes of Exercise per Session: 30 min   Stress:  No Stress Concern Present   • Feeling of Stress : Not at all   Social Connections: Moderately Integrated   • Frequency of Communication with Friends and Family: More than three times a week   • Frequency of Social Gatherings with Friends and Family: Twice a week   • Attends Scientologist Services: Never   • Active Member of Clubs or Organizations: Yes   • Attends Club or Organization Meetings: More than 4 times per year   • Marital Status:    Intimate Partner Violence:    • Fear of Current or Ex-Partner: Not on file   • Emotionally Abused: Not on file   • Physically Abused: Not on file   • Sexually Abused: Not on file   Housing Stability: Low Risk    • Unable to Pay for Housing in the Last Year: No   • Number of Places Lived in the Last Year: 1   • Unstable Housing in the Last Year: No       Allergies:  No Known Allergies    Medications: reviewed on epic.   No outpatient medications have been marked as taking for the 1/24/22 encounter (Appointment) with Elisabet Wyatt M.D..        Current Outpatient Medications on File Prior to Visit   Medication Sig Dispense Refill   • celecoxib (CELEBREX) 200 MG Cap TAKE 1 CAPSULE BY MOUTH EVERY DAY 90 Capsule 0   • nystatin (MYCOSTATIN) 688016 UNIT/GM Cream topical cream APPLY 1 GM TO AFFECTED AREA(S) 2 TIMES A DAY. 90 g 2   • B Complex Vitamins (VITAMIN B COMPLEX PO) every day.     • Cholecalciferol (VITAMIN D3) 5000 UNITS Cap Take 1 Cap by mouth every day.     • FIBER PO Take  by mouth every day.     • diphenhydrAMINE (BENADRYL) 25 MG Tab Take 25 mg by mouth every 6 hours as needed for Sleep.       No current facility-administered medications on file prior to visit.         EXAMINATION     Physical Exam:   There were no vitals taken for this visit.    Constitutional:   Body Habitus: There is no height or weight on file to calculate BMI.  Cooperation: Fully cooperates with exam  Appearance: Well-groomed, well-nourished.    Eyes: No scleral icterus to suggest severe liver  "disease, no proptosis to suggest severe hyperthyroidism    ENT -no obvious auditory deficits, no noticeable facial droop     Skin -no rashes or lesions noted     Respiratory-  breathing comfortably on room air, no audible wheezing    Cardiovascular-distal extremities warm and well perfused.  No lower extremity edema is noted.     Gastrointestinal - no obvious abdominal masses, non-distended    Psychiatric- alert and oriented ×3. Normal affect.     Gait - normal gait, no use of ambulatory device, nonantalgic. ***Heel walking and toe walking intact.    Musculoskeletal and Neuro -     Cervical spine   Inspection: No deformities of the skin over the cervical spine. No rashes or lesions.    {surom:11090} active range of motion in all directions    Spurling's sign  {suprovocativetests:87846::\"negative bilaterally\"}  Cervical facet loading maneuver  {suprovocativetests:96479::\"negative bilaterally\"}    No*** signs of muscular atrophy in bilateral upper extremities     Tenderness to palpation at {cervicalttp:45426}. No tenderness to palpation elsewhere including {cervicalttp:00273}.      Key points for the international standards for neurological classification of spinal cord injury (ISNCSCI) to light touch.     Dermatome R L   C4 2 2   C5 2 2   C6 2 2   C7 2 2   C8 2 2   T1 2 2   T2 2 2       Motor Exam Upper Extremities   ? Myotome R L   Shoulder abduction C5 5 5   Elbow flexion C5 5 5   Wrist extension C6 5 5   Elbow extension C7 5 5   Finger flexion C8 5 5   Finger abduction T1 5 5     Reflexes  ?  R L   Biceps  2+ 2+   Brachioradialis  2+ 2+     Olivo's sign {ck r/l positive:40799::\"negative bilaterally\"}            Thoracic/Lumbar Spine/Sacral Spine/Hips   Inspection: No*** evidence of atrophy in bilateral lower extremities throughout     ROM: {surom:93345} active range of motion with lumbar flexion, lateral flexion, and rotation bilaterally.   There is {surom:69747} active range of motion with lumbar " "extension    Facet loading maneuver {suprovocativetests:47569::\"negative bilaterally\"}    Palpation:   Tenderness to palpation over the {lumbarttp:41233}. No tenderness to palpation elsewhere in the low back/hips including {lumbarttp:50245}.    Lumbar spine /hip provocative exam maneuvers  Straight leg raise {suprovocativetests:83699::\"negative bilaterally\"}  Slump-sit test {suprovocativetests:94942::\"negative bilaterally\"}  FADIR test {suprovocativetests:51635::\"negative bilaterally\"}    HIP  Log roll test {suprovocativetests:81949::\"negative bilaterally\"}  Range of motion in the RIGHT hip is {ck rom:09236::\"full \"} in flexion, extension, abduction, internal rotation, external rotation.  Range of motion in the LEFT hip is {ck rom:50764::\"full \"} in flexion, extension, abduction, internal rotation, external rotation.    SI joint tests  CORY test {suprovocativetests:70156::\"negative bilaterally\"}  SI joint compression {suprovocativetests:95466::\"negative bilaterally\"}  SI joint distraction {suprovocativetests:52337::\"negative bilaterally\"}  Sacral thrust test {suprovocativetests:80148::\"negative bilaterally\"}  Thigh thrust test {suprovocativetests:61522::\"negative bilaterally\"}  Gaenslens maneuver {suprovocativetests:13658::\"negative bilaterally\"}  Blu finger sign {suprovocativetests:91286::\"negative bilaterally\"}      Key points for the international standards for neurological classification of spinal cord injury (ISNCSCI) to light touch.   Dermatome R L   L2 2 2   L3 2 2   L4 2 2   L5 2 2   S1 2 2   S2 2 2       Motor Exam Lower Extremities  ? Myotome R L   Hip flexion L2 5 5   Knee extension L3 5 5   Ankle dorsiflexion L4 5 5   Toe extension L5 5 5   Ankle plantarflexion S1 5 5       Reflexes  ?  R L   Patella  2+ 2+   Achilles   2+ 2+     Clonus of the ankle {ck r/l positive:79608::\"negative bilaterally\"}       MEDICAL DECISION MAKING    Medical records review: see under HPI section.     DATA    Labs: " ***  Lab Results   Component Value Date/Time    SODIUM 141 05/20/2020 10:32 AM    POTASSIUM 4.4 05/20/2020 10:32 AM    CHLORIDE 107 05/20/2020 10:32 AM    CO2 24 05/20/2020 10:32 AM    ANION 10.0 05/20/2020 10:32 AM    GLUCOSE 104 (H) 05/20/2020 10:32 AM    BUN 19 05/20/2020 10:32 AM    CREATININE 0.72 05/20/2020 10:32 AM    CALCIUM 9.6 05/20/2020 10:32 AM    ASTSGOT 24 05/20/2020 10:32 AM    ALTSGPT 26 05/20/2020 10:32 AM    TBILIRUBIN 1.0 05/20/2020 10:32 AM    ALBUMIN 4.3 05/20/2020 10:32 AM    TOTPROTEIN 6.7 05/20/2020 10:32 AM    GLOBULIN 2.4 05/20/2020 10:32 AM    AGRATIO 1.8 05/20/2020 10:32 AM       Lab Results   Component Value Date/Time    PROTHROMBTM 17.6 (H) 11/06/2009 04:20 AM    INR 1.53 (H) 11/06/2009 04:20 AM        Lab Results   Component Value Date/Time    WBC 6.5 09/21/2015 07:18 AM    RBC 4.50 09/21/2015 07:18 AM    HEMOGLOBIN 13.4 09/21/2015 07:18 AM    HEMATOCRIT 42.7 09/21/2015 07:18 AM    MCV 94.9 09/21/2015 07:18 AM    MCH 29.8 09/21/2015 07:18 AM    MCHC 31.4 (L) 09/21/2015 07:18 AM    MPV 10.1 09/21/2015 07:18 AM    NEUTSPOLYS 59.70 09/21/2015 07:18 AM    LYMPHOCYTES 28.30 09/21/2015 07:18 AM    MONOCYTES 7.80 09/21/2015 07:18 AM    EOSINOPHILS 2.60 09/21/2015 07:18 AM    BASOPHILS 1.10 09/21/2015 07:18 AM        Lab Results   Component Value Date/Time    HBA1C 5.4 09/21/2015 07:18 AM        Imaging:   I personally reviewed following images, these are my reads  ***        IMAGING radiology reads. I reviewed the following radiology reads                                                                 Results for orders placed during the hospital encounter of 11/20/17    DX-CERVICAL SPINE-2 OR 3 VIEWS    Impression  1.  Moderate degenerative changes in the mid cervical spine.    2.  No acute compression fracture or subluxation.          Results for orders placed during the hospital encounter of 07/02/08    DX-FINGER(S) 2+    Impression  IMPRESSION:    MILD FIRST CMC OSTEOARTHRITIS, WHICH IS  STABLE TO SLIGHTLY WORSE.    Results for orders placed during the hospital encounter of 04/05/07    DX-FOOT-COMPLETE 3+    Impression  IMPRESSION:    MILD DEGENERATIVE CHANGE AS DESCRIBED ABOVE.    GAK/djb      Read By JUAN HANKS MD on Apr 5 2007  1:11PM  : AGNIESZKA Transcription Date: Apr 5 2007  5:55PM  THIS DOCUMENT HAS BEEN ELECTRONICALLY SIGNED BY: JUAN HANKS MD on  Apr 6 2007 12:44PM              Results for orders placed in visit on 11/30/21    DX-KNEE COMPLETE 4+ RIGHT   Results for orders placed during the hospital encounter of 11/11/21    DX-LUMBAR SPINE-2 OR 3 VIEWS    Impression  1.  Multilevel degenerative disc disease is most pronounced at L3-4, L4-5, and L5-S1.    2.  Moderate facet arthropathy in the lower lumbar spine.    3.  Minimal degenerative retrolisthesis at L3-4.             Results for orders placed in visit on 01/14/22    DX-SHOULDER 2+ RIGHT       Results for orders placed during the hospital encounter of 07/02/08    DX-WRIST-COMPLETE 3+    Impression  IMPRESSION:    MILD STT OSTEOARTHRITIS WITHOUT EVIDENCE OF EROSIVE CHANGE OR FRACTURE.    TJS/srd    Read By DRAGAN MEDINA MD on Jul 2 2008  9:36AM  : JUANY Transcription Date: Jul 2 2008 11:57AM  THIS DOCUMENT HAS BEEN ELECTRONICALLY SIGNED BY: DRAGAN MEDINA MD on  Jul 2 2008  6:06PM         Diagnosis  {No diagnosis found. (Refresh or delete this SmartLink)}      ASSESSMENT AND PLAN:  Dorcas Michael is a 66 y.o. female with history of *** who presents with *** suggestive of ***.     There are no diagnoses linked to this encounter.      The above note documents my personal evaluation of this patient. In addition, I have reviewed and confirmed with the patient and MA the supportive information documented in today's Patient Health Questionnaire and Office Note.       PLAN  Physical Therapy: I ordered physical therapy to focus on strengthening and stretching as well as a home exercise  program. ***    Home Exercise Program: I provided the patient with a home exercise program focusing on strengthening and stretching.     Diagnostic workup: {suapimagin}    Medications: {sumeds:76596}  reviewed, records do not demonstrate increased risk of opioid abuse.    Interventions: ***     Referrals: *** pain psychology, EMG/NCS    Outside records requested:  The patient signed outside records request form for her outside records including outside images. This includes the records from {surequestrec:57262}    Follow-up: No follow-ups on file.  {cartagena followup:38010} or sooner as needed    No orders of the defined types were placed in this encounter.      Elisabet Cartagena MD  Interventional Pain Management  Physical Medicine and Rehabilitation  [facility]     Theresa Villanueva M.D.     Please note that this dictation was created using voice recognition software. I have made every reasonable attempt to correct obvious errors, but I expect that there are errors of grammar and possibly content that I did not discover before finalizing the note.

## 2022-01-24 ENCOUNTER — APPOINTMENT (OUTPATIENT)
Dept: PHYSICAL MEDICINE AND REHAB | Facility: MEDICAL CENTER | Age: 67
End: 2022-01-24
Payer: MEDICARE

## 2022-01-26 ENCOUNTER — HOSPITAL ENCOUNTER (OUTPATIENT)
Dept: LAB | Facility: MEDICAL CENTER | Age: 67
End: 2022-01-26
Attending: FAMILY MEDICINE
Payer: MEDICARE

## 2022-01-26 DIAGNOSIS — Z13.220 SCREENING FOR HYPERLIPIDEMIA: ICD-10-CM

## 2022-01-26 DIAGNOSIS — Z13.0 SCREENING FOR DEFICIENCY ANEMIA: ICD-10-CM

## 2022-01-26 DIAGNOSIS — Z13.1 SCREENING FOR DIABETES MELLITUS (DM): ICD-10-CM

## 2022-01-26 LAB
ALBUMIN SERPL BCP-MCNC: 4.3 G/DL (ref 3.2–4.9)
ALBUMIN/GLOB SERPL: 1.6 G/DL
ALP SERPL-CCNC: 63 U/L (ref 30–99)
ALT SERPL-CCNC: 20 U/L (ref 2–50)
ANION GAP SERPL CALC-SCNC: 11 MMOL/L (ref 7–16)
AST SERPL-CCNC: 17 U/L (ref 12–45)
BASOPHILS # BLD AUTO: 0.8 % (ref 0–1.8)
BASOPHILS # BLD: 0.06 K/UL (ref 0–0.12)
BILIRUB SERPL-MCNC: 1 MG/DL (ref 0.1–1.5)
BUN SERPL-MCNC: 23 MG/DL (ref 8–22)
CALCIUM SERPL-MCNC: 9.8 MG/DL (ref 8.4–10.2)
CHLORIDE SERPL-SCNC: 100 MMOL/L (ref 96–112)
CHOLEST SERPL-MCNC: 197 MG/DL (ref 100–199)
CO2 SERPL-SCNC: 26 MMOL/L (ref 20–33)
CREAT SERPL-MCNC: 0.64 MG/DL (ref 0.5–1.4)
EOSINOPHIL # BLD AUTO: 0.11 K/UL (ref 0–0.51)
EOSINOPHIL NFR BLD: 1.4 % (ref 0–6.9)
ERYTHROCYTE [DISTWIDTH] IN BLOOD BY AUTOMATED COUNT: 47.8 FL (ref 35.9–50)
FASTING STATUS PATIENT QL REPORTED: NORMAL
GLOBULIN SER CALC-MCNC: 2.7 G/DL (ref 1.9–3.5)
GLUCOSE SERPL-MCNC: 97 MG/DL (ref 65–99)
HCT VFR BLD AUTO: 44.4 % (ref 37–47)
HDLC SERPL-MCNC: 89 MG/DL
HGB BLD-MCNC: 14.8 G/DL (ref 12–16)
IMM GRANULOCYTES # BLD AUTO: 0.03 K/UL (ref 0–0.11)
IMM GRANULOCYTES NFR BLD AUTO: 0.4 % (ref 0–0.9)
LDLC SERPL CALC-MCNC: 101 MG/DL
LYMPHOCYTES # BLD AUTO: 1.94 K/UL (ref 1–4.8)
LYMPHOCYTES NFR BLD: 24.5 % (ref 22–41)
MCH RBC QN AUTO: 32.4 PG (ref 27–33)
MCHC RBC AUTO-ENTMCNC: 33.3 G/DL (ref 33.6–35)
MCV RBC AUTO: 97.2 FL (ref 81.4–97.8)
MONOCYTES # BLD AUTO: 0.52 K/UL (ref 0–0.85)
MONOCYTES NFR BLD AUTO: 6.6 % (ref 0–13.4)
NEUTROPHILS # BLD AUTO: 5.25 K/UL (ref 2–7.15)
NEUTROPHILS NFR BLD: 66.3 % (ref 44–72)
NRBC # BLD AUTO: 0 K/UL
NRBC BLD-RTO: 0 /100 WBC
PLATELET # BLD AUTO: 243 K/UL (ref 164–446)
PMV BLD AUTO: 10.1 FL (ref 9–12.9)
POTASSIUM SERPL-SCNC: 4.2 MMOL/L (ref 3.6–5.5)
PROT SERPL-MCNC: 7 G/DL (ref 6–8.2)
RBC # BLD AUTO: 4.57 M/UL (ref 4.2–5.4)
SODIUM SERPL-SCNC: 137 MMOL/L (ref 135–145)
TRIGL SERPL-MCNC: 37 MG/DL (ref 0–149)
WBC # BLD AUTO: 7.9 K/UL (ref 4.8–10.8)

## 2022-01-26 PROCEDURE — 85025 COMPLETE CBC W/AUTO DIFF WBC: CPT

## 2022-01-26 PROCEDURE — 36415 COLL VENOUS BLD VENIPUNCTURE: CPT

## 2022-01-26 PROCEDURE — 80053 COMPREHEN METABOLIC PANEL: CPT

## 2022-01-26 PROCEDURE — 80061 LIPID PANEL: CPT

## 2022-02-03 ENCOUNTER — OFFICE VISIT (OUTPATIENT)
Dept: PHYSICAL MEDICINE AND REHAB | Facility: MEDICAL CENTER | Age: 67
End: 2022-02-03
Payer: MEDICARE

## 2022-02-03 VITALS
OXYGEN SATURATION: 96 % | BODY MASS INDEX: 37.03 KG/M2 | HEART RATE: 64 BPM | DIASTOLIC BLOOD PRESSURE: 88 MMHG | TEMPERATURE: 98.1 F | WEIGHT: 250 LBS | HEIGHT: 69 IN | SYSTOLIC BLOOD PRESSURE: 128 MMHG

## 2022-02-03 DIAGNOSIS — M25.562 CHRONIC PAIN OF BOTH KNEES: ICD-10-CM

## 2022-02-03 DIAGNOSIS — M25.561 CHRONIC PAIN OF BOTH KNEES: ICD-10-CM

## 2022-02-03 DIAGNOSIS — I89.0 LYMPHEDEMA OF LEFT LOWER EXTREMITY: Primary | ICD-10-CM

## 2022-02-03 DIAGNOSIS — G89.29 CHRONIC PAIN OF BOTH KNEES: ICD-10-CM

## 2022-02-03 DIAGNOSIS — M79.605 LEFT LEG PAIN: ICD-10-CM

## 2022-02-03 DIAGNOSIS — Z96.653 HISTORY OF BILATERAL KNEE REPLACEMENT: ICD-10-CM

## 2022-02-03 PROCEDURE — 99204 OFFICE O/P NEW MOD 45 MIN: CPT | Performed by: STUDENT IN AN ORGANIZED HEALTH CARE EDUCATION/TRAINING PROGRAM

## 2022-02-03 RX ORDER — OXYCODONE HYDROCHLORIDE AND ACETAMINOPHEN 5; 325 MG/1; MG/1
1 TABLET ORAL EVERY 4 HOURS PRN
COMMUNITY
End: 2022-04-05 | Stop reason: SDUPTHER

## 2022-02-03 ASSESSMENT — PATIENT HEALTH QUESTIONNAIRE - PHQ9
5. POOR APPETITE OR OVEREATING: 0 - NOT AT ALL
CLINICAL INTERPRETATION OF PHQ2 SCORE: 0

## 2022-02-03 ASSESSMENT — PAIN SCALES - GENERAL: PAINLEVEL: 2=MINIMAL-SLIGHT

## 2022-02-03 ASSESSMENT — FIBROSIS 4 INDEX: FIB4 SCORE: 1.05

## 2022-02-03 NOTE — PATIENT INSTRUCTIONS
- take tylenol as needed up to 4 grams per day, in divided doses at least 6 hours apart. Do not take more than 1 gram at a time.

## 2022-02-03 NOTE — Clinical Note
Gen Villanueva,    Thanks for referring Dorcas Michael.  We discussed her lymphedema which seems to cause her to fall, leading to her pain.  I agree with your PT referral.  I advised her to increase the duration of leg compression, with wrapping or compression stockings.  Her R shoulder pain has improved since receiving an injection last month. Her knees are bothersome and I discussed genicular nerve blocks which she deferred.    At this time my office is not accepting new referrals for chronic opioid management but we do offer consultations to wean opioids or for interventions.  She takes Percocet PRN typically only when traveling or after a fall, which significantly helps her perform her ADLs.  She states that 60 pills typically last for 6 months.  It seems that her Percocet is working well for her.  I did  her that she may try Tylenol PRN up to 4 g/day and see if this could adequately substitute for Percocet.  I told her that I would share this with you.    Elisabet

## 2022-02-03 NOTE — PROGRESS NOTES
New Patient Note    Interventional Pain and Spine  Physiatry (Physical Medicine and Rehabilitation)     Patient Name: Dorcas Michael  : 1955  Date of Service: 22  PCP: Theresa Villanueva M.D.  Referring Provider: Theresa Villanueva M.D.    Chief Complaint:   Chief Complaint   Patient presents with   • New Patient     Right thigh pain       HISTORY    HPI:  Dorcas Michael is a 66 y.o. female who presents today with pain at her left leg. She attributes this to lymphedema which she was diagnosed with in her 40s. Pain severity rates 3/10 and can either improve or worsen with sitting and standing. Her pain improves with celebrex daily and percocet rarely PRN.    Her lymphedema primarily affects her left leg and also affects her right leg to a much lesser degree. Lymphedema results in fluctuating swelling in her left leg and foot making it difficult to don and doff socks. She manages her lymphedema with a pump that she applies for 30 minutes at a time and compression socks.  She also elevates her leg.  She last did physical therapy for lymphedema years ago and has been referred to resume physical therapy soon.  She states she has had a work-up for lymphedema which was unrevealing.      She also reports pain at different locations including her right shoulder for which she had an injection at [facility] with significant relief.  She also reports history of right greater trochanteric bursitis and low back pain which previously improved with injections, and now do not significantly bother her. She also has bilateral knee pain, worsened since tripping over her dog around [facility], and pain in her posterior calves. She has a history of bilateral TKRs. She is unsure why her pain has worsened but thinks it could be related to lymphedema.    The patient denies new weakness, numbness, or bladder/bowel incontinence     Saw Dr. Villalba for her shoulder who felt she does not need surgical intervention. Then saw Dr. Corcoran  at [facility] who recommended PT and performed R SA bursa injection on 1/14 with significant relief.    The patient has done physical therapy for this problem and is still doing her home exercise program. Has PT scheduled again in March.    Patient has tried the following medications with varied success (current meds in bold):   percocet 5-325mg 1 tab daily PRN by PCP on 12/21. States this helps her when she travels. Says she uses about 60 pills in 6 months.  celebrex    Therapeutic modalities and interventional therapies to date include:  - R SA bursa injection at [facility] 1/14/22  - epidural - significant relief  - right greater trochanteric bursa injection with PCP - significant relief  - Bilateral knee replacements    Medical history includes bilateral TKR, lymphedema, bilateral knee replacement, bilateral CMC arthritis    Psychological testing for pain as depression and pain commonly coexist and need to both be treated.     Opioid Risk Score: 0     Interpretation of Opioid Risk Score   Score 0-3 = Low risk of abuse. Do UDS at least once per year.  Score 4-7 = Moderate risk of abuse. Do UDS 1-4 times per year.  Score 8+ = High risk of abuse. Refer to specialist.    PHQ  Depression Screen (PHQ-2/PHQ-9) 7/6/2021 12/21/2021 2/3/2022   PHQ-2 Total Score 0 0 0       Interpretation of PHQ-9 Total Score   Score Severity   1-4 No Depression   5-9 Mild Depression   10-14 Moderate Depression   15-19 Moderately Severe Depression   20-27 Severe Depression      Medical records review:  I reviewed the note from the referring provider Theresa Villanueva M.D. including the note dated 12/21/21.    ROS:   Red Flags ROS:   Fever, Chills, Sweats: Denies  Involuntary Weight Loss: Denies  Bladder Incontinence: Denies  Bowel Incontinence: denies  Saddle Anesthesia: Denies    All other systems reviewed and negative.     PMHx:   Past Medical History:   Diagnosis Date   • Arthritis     both hands   • Lymphedema 1995    bilateral legs   • Pain      left knee 4/10   • Pain     hands, feet   • Pain     left hand       PSHx:   Past Surgical History:   Procedure Laterality Date   • VENTRAL HERNIA REPAIR LAPAROSCOPIC  2/16/2021    Procedure: REPAIR, HERNIA, VENTRAL, LAPAROSCOPIC - UMBILICAL;  Surgeon: Lilliana Mejia M.D.;  Location: SURGERY SAME DAY [facility];  Service: General   • HERNIA REPAIR  02/16/2021    Umbilical    • FINGER ARTHROPLASTY Left 8/22/2017    Procedure: FINGER ARTHROPLASTY - CARPAL METACARPAL;  Surgeon: Magnus Anderson M.D.;  Location: SURGERY [facility];  Service:    • TENDON TRANSFER Left 8/22/2017    Procedure: TENDON TRANSFER - FLEXI CARPI RADIALIS;  Surgeon: Magnus Anderson M.D.;  Location: [facility];  Service:    • CAPSULOTOMY  8/22/2017    Procedure: CAPSULOTOMY - METACARPAL PHALANGEAL JOINT CAPSULODESIS;  Surgeon: Magnus Anderson M.D.;  Location: [facility];  Service:    • FINGER ARTHROPLASTY Right 9/20/2016    Procedure: FINGER ARTHROPLASTY - CARPAL METACARPAL;  Surgeon: Magnus Anderson M.D.;  Location: [facility];  Service:    • TENDON TRANSFER Right 9/20/2016    Procedure: TENDON TRANSFER - FLEXI CARPI RADIALIS;  Surgeon: Magnus Anderson M.D.;  Location: [facility];  Service:    • PIN INSERTION Right 9/20/2016    Procedure: PIN INSERTION - METACARPAL PHALANGEAL JOINT;  Surgeon: Magnus Anderson M.D.;  Location: [facility];  Service:    • KNEE MANIPULATION  3/1/2010    Performed by OSGOOD, PATRICK J at [facility]   • KNEE ARTHROPLASTY TOTAL  11/3/2009    Performed by OSGOOD, PATRICK J at [facility]   • ACHILLES TENDON REP Right 5/2005    right   • KNEE ARTHROPLASTY TOTAL Right 12/2003    right   • KNEE ARTHROSCOPY Right 5/1998    right   • RECTOCELE REPAIR  6/1990    repair cystocele   • KNEE ARTHROTOMY Left 1973    left lateral meniscectomy   • KNEE ARTHROTOMY Right 1971    right lateral meniscectomy       Family Hx:   Family  History   Problem Relation Age of Onset   • Heart Disease Other    • Other Mother         MS   • Thyroid Daughter    • GI Disease Other         Crohns   • Thyroid Sister        Social Hx:  Social History     Socioeconomic History   • Marital status:      Spouse name: Not on file   • Number of children: Not on file   • Years of education: Not on file   • Highest education level: Associate degree: occupational, technical, or vocational program   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    Tobacco Use   • Smoking status: Never Smoker   • Smokeless tobacco: Never Used   Vaping Use   • Vaping Use: Never used   Substance and Sexual Activity   • Alcohol use: Yes     Alcohol/week: 0.6 - 1.2 oz     Types: 1 - 2 Glasses of wine per week     Comment: 1-2 per day   • Drug use: No   • Sexual activity: Yes     Partners: Male   Other Topics Concern   • Not on file   Social History Narrative   • Not on file     Social Determinants of Health     Financial Resource Strain: Low Risk    • Difficulty of Paying Living Expenses: Not hard at all   Food Insecurity: No Food Insecurity   • Worried About Running Out of Food in the Last Year: Never true   • Ran Out of Food in the Last Year: Never true   Transportation Needs: No Transportation Needs   • Lack of Transportation (Medical): No   • Lack of Transportation (Non-Medical): No   Physical Activity: Sufficiently Active   • Days of Exercise per Week: 7 days   • Minutes of Exercise per Session: 30 min   Stress: No Stress Concern Present   • Feeling of Stress : Not at all   Social Connections: Moderately Integrated   • Frequency of Communication with Friends and Family: More than three times a week   • Frequency of Social Gatherings with Friends and Family: Twice a week   • Attends Temple Services: Never   • Active Member of Clubs or Organizations: Yes   • Attends Club or Organization Meetings: More than 4 times per year   • Marital Status:    Intimate Partner Violence:     • Fear of Current or Ex-Partner: Not on file   • Emotionally Abused: Not on file   • Physically Abused: Not on file   • Sexually Abused: Not on file   Housing Stability: Low Risk    • Unable to Pay for Housing in the Last Year: No   • Number of Places Lived in the Last Year: 1   • Unstable Housing in the Last Year: No       Allergies:  No Known Allergies    Medications: reviewed on epic.   Outpatient Medications Marked as Taking for the 2/3/22 encounter (Office Visit) with Elisabet Wyatt M.D.   Medication Sig Dispense Refill   • Probiotic Product (PROBIOTIC BLEND PO)      • oxyCODONE-acetaminophen (PERCOCET) 5-325 MG Tab Take 1 Tablet by mouth every four hours as needed.     • celecoxib (CELEBREX) 200 MG Cap TAKE 1 CAPSULE BY MOUTH EVERY DAY 90 Capsule 0   • nystatin (MYCOSTATIN) 982351 UNIT/GM Cream topical cream APPLY 1 GM TO AFFECTED AREA(S) 2 TIMES A DAY. 90 g 2   • B Complex Vitamins (VITAMIN B COMPLEX PO) every day.     • Cholecalciferol (VITAMIN D3) 5000 UNITS Cap Take 1 Cap by mouth every day.     • FIBER PO Take  by mouth every day.     • diphenhydrAMINE (BENADRYL) 25 MG Tab Take 25 mg by mouth every 6 hours as needed for Sleep.          Current Outpatient Medications on File Prior to Visit   Medication Sig Dispense Refill   • Probiotic Product (PROBIOTIC BLEND PO)      • oxyCODONE-acetaminophen (PERCOCET) 5-325 MG Tab Take 1 Tablet by mouth every four hours as needed.     • celecoxib (CELEBREX) 200 MG Cap TAKE 1 CAPSULE BY MOUTH EVERY DAY 90 Capsule 0   • nystatin (MYCOSTATIN) 650503 UNIT/GM Cream topical cream APPLY 1 GM TO AFFECTED AREA(S) 2 TIMES A DAY. 90 g 2   • B Complex Vitamins (VITAMIN B COMPLEX PO) every day.     • Cholecalciferol (VITAMIN D3) 5000 UNITS Cap Take 1 Cap by mouth every day.     • FIBER PO Take  by mouth every day.     • diphenhydrAMINE (BENADRYL) 25 MG Tab Take 25 mg by mouth every 6 hours as needed for Sleep.       No current facility-administered medications on file prior to  "visit.         EXAMINATION     Physical Exam:   /88 (BP Location: Right arm, Patient Position: Sitting, BP Cuff Size: Adult)   Pulse 64   Temp 36.7 °C (98.1 °F) (Temporal)   Ht 1.753 m (5' 9\")   Wt 113 kg (250 lb)   SpO2 96%     Constitutional:   Body Habitus: Body mass index is 36.92 kg/m².  Cooperation: Fully cooperates with exam  Appearance: Well-groomed, well-nourished.    Eyes: No scleral icterus to suggest severe liver disease, no proptosis to suggest severe hyperthyroidism    ENT -no obvious auditory deficits, no noticeable facial droop     Skin -no rashes or lesions noted     Respiratory-  breathing comfortably on room air, no audible wheezing    Cardiovascular-distal extremities warm and well perfused.  Lymphedema of left lower extremity up to thigh.  Mild lymphedema right lower extremity    Gastrointestinal - no obvious abdominal masses, non-distended    Psychiatric- alert and oriented ×3. Normal affect.     Gait - normal gait, no use of ambulatory device, nonantalgic.    Musculoskeletal and Neuro -       Thoracic/Lumbar Spine/Sacral Spine/Hips   Inspection: No evidence of atrophy in bilateral lower extremities throughout     Facet loading maneuver negative bilaterally    Palpation:   No tenderness to palpation over the midline of lumbosacral spine, paraspinal muscles bilaterally, lumbar facets bilaterally, sacroiliac joints bilaterally, PSIS bilaterally and greater trochanters bilaterally.     Key points for the international standards for neurological classification of spinal cord injury (ISNCSCI) to light touch.   Dermatome R L   L2 2 2   L3 2 2   L4 2 2   L5 2 2   S1 2 2   S2 2 2       Motor Exam Lower Extremities  ? Myotome R L   Hip flexion L2 5 5   Knee extension L3 5 5   Ankle dorsiflexion L4 5 5   Toe extension L5 5 5   Ankle plantarflexion S1 5 5     RIGHT KNEE   Inspection: no swelling, rashes, or deformities  Palpation: Tenderness to palpation at bilateral suprapatellar and " infrapatellar facets    Also with tenderness to palpation at bilateral posterior calves    MEDICAL DECISION MAKING    Medical records review: see under HPI section.     DATA    Labs: Personally reviewed at today's visit    Lab Results   Component Value Date/Time    SODIUM 137 01/26/2022 01:34 PM    POTASSIUM 4.2 01/26/2022 01:34 PM    CHLORIDE 100 01/26/2022 01:34 PM    CO2 26 01/26/2022 01:34 PM    ANION 11.0 01/26/2022 01:34 PM    GLUCOSE 97 01/26/2022 01:34 PM    BUN 23 (H) 01/26/2022 01:34 PM    CREATININE 0.64 01/26/2022 01:34 PM    CALCIUM 9.8 01/26/2022 01:34 PM    ASTSGOT 17 01/26/2022 01:34 PM    ALTSGPT 20 01/26/2022 01:34 PM    TBILIRUBIN 1.0 01/26/2022 01:34 PM    ALBUMIN 4.3 01/26/2022 01:34 PM    TOTPROTEIN 7.0 01/26/2022 01:34 PM    GLOBULIN 2.7 01/26/2022 01:34 PM    AGRATIO 1.6 01/26/2022 01:34 PM       Lab Results   Component Value Date/Time    PROTHROMBTM 17.6 (H) 11/06/2009 04:20 AM    INR 1.53 (H) 11/06/2009 04:20 AM        Lab Results   Component Value Date/Time    WBC 7.9 01/26/2022 01:34 PM    RBC 4.57 01/26/2022 01:34 PM    HEMOGLOBIN 14.8 01/26/2022 01:34 PM    HEMATOCRIT 44.4 01/26/2022 01:34 PM    MCV 97.2 01/26/2022 01:34 PM    MCH 32.4 01/26/2022 01:34 PM    MCHC 33.3 (L) 01/26/2022 01:34 PM    MPV 10.1 01/26/2022 01:34 PM    NEUTSPOLYS 66.30 01/26/2022 01:34 PM    LYMPHOCYTES 24.50 01/26/2022 01:34 PM    MONOCYTES 6.60 01/26/2022 01:34 PM    EOSINOPHILS 1.40 01/26/2022 01:34 PM    BASOPHILS 0.80 01/26/2022 01:34 PM        Lab Results   Component Value Date/Time    HBA1C 5.4 09/21/2015 07:18 AM        Imaging:   I personally reviewed following images, these are my reads  XR lumbar spine 11/11/21  Moderate facet arthropathy at bilateral L4-5 and L5-S1.    X-ray bilateral knees 11/30/2021 total knee replacement hardware appears intact.    IMAGING radiology reads. I reviewed the following radiology reads   Results for orders placed during the hospital encounter of 11/11/21    DX-LUMBAR  SPINE-2 OR 3 VIEWS    Impression  1.  Multilevel degenerative disc disease is most pronounced at L3-4, L4-5, and L5-S1.    2.  Moderate facet arthropathy in the lower lumbar spine.    3.  Minimal degenerative retrolisthesis at L3-4.      Diagnosis  Visit Diagnoses     ICD-10-CM   1. Lymphedema of left lower extremity  I89.0   2. Chronic pain of both knees  M25.561    M25.562    G89.29   3. History of bilateral knee replacement  Z96.653   4. Left leg pain  M79.605         ASSESSMENT AND PLAN:  Dorcas Michael is a 66 y.o. female with history of bilateral TKR, lymphedema (L>R legs), bilateral knee replacement, and bilateral CMC arthritis who presents with pain in multiple locations which she attributes to falling from sequela of lymphedema.  She does have significant swelling on exam today in her left leg.  Today she endorses pain at her bilateral knees and posterior calves since a fall around Thanksgiving.  She does have tenderness to palpation at her patellar facets bilaterally.  X-rays of bilateral knees reviewed by me today, showing intact hardware placement.    She also reports history of low back pain and right greater trochanteric bursitis which improved after injections.  These pains do not significantly limit her at this time.    She also has right shoulder pain which is being managed by [facility].       Dorcas was seen today for new patient.    Diagnoses and all orders for this visit:    Lymphedema of left lower extremity    Chronic pain of both knees    History of bilateral knee replacement    Left leg pain          The above note documents my personal evaluation of this patient. In addition, I have reviewed and confirmed with the patient and MA the supportive information documented in today's Patient Health Questionnaire and Office Note.       PLAN  Physical Therapy: Agree with physical therapy as already ordered.  Discussed that I that she should continue to use her pump for her left leg and compression  stockings for her left leg.  It appears that her swelling can fluctuate significantly over the course of the day, and I advised that she increase the duration of wrapping for her left leg as well as use compression stockings more often.    Diagnostic workup: no further imaging needed at this time    Medications:  -Okay to continue Percocet as prescribed by PCP which patient endorses significant relief on and no unwanted side effects.  She states that a 60 will supply lasts her about 6 months.  reviewed, records do not demonstrate increased risk of opioid abuse.  Discussed that at this time we do not accept new referrals for chronic opioid prescriptions and that I would relay this message to her PCP.  - take tylenol as needed up to 4 grams per day, in divided doses at least 6 hours apart. Do not take more than 1 gram at a time.  -Okay to continue Celebrex.  Patient's creatinine reviewed, within normal limits.  Counseled on risk of chronic renal injury with long term daily use of NSAIDs.    Interventions:  - Discussed that if patient's low back pain or right greater trochanteric bursitis were to return, would consider injections targeting them.  - Also discussed possible genicular nerve blocks for bilateral knees if her pain worsens.     Follow-up: CALI Wyatt MD  Interventional Pain Management  Physical Medicine and Rehabilitation  [facility]     Theresa Villanueva M.D.     Please note that this dictation was created using voice recognition software. I have made every reasonable attempt to correct obvious errors, but I expect that there are errors of grammar and possibly content that I did not discover before finalizing the note.

## 2022-02-08 ENCOUNTER — PATIENT MESSAGE (OUTPATIENT)
Dept: HEALTH INFORMATION MANAGEMENT | Facility: OTHER | Age: 67
End: 2022-02-08
Payer: MEDICARE

## 2022-02-28 ENCOUNTER — HOSPITAL ENCOUNTER (OUTPATIENT)
Dept: RADIOLOGY | Facility: MEDICAL CENTER | Age: 67
End: 2022-02-28
Attending: FAMILY MEDICINE
Payer: MEDICARE

## 2022-02-28 DIAGNOSIS — Z12.31 BREAST CANCER SCREENING BY MAMMOGRAM: ICD-10-CM

## 2022-02-28 DIAGNOSIS — M18.0 PRIMARY ARTHROSIS OF FIRST CARPOMETACARPAL JOINTS, BILATERAL: ICD-10-CM

## 2022-02-28 PROCEDURE — 77063 BREAST TOMOSYNTHESIS BI: CPT

## 2022-02-28 RX ORDER — CELECOXIB 200 MG/1
200 CAPSULE ORAL
Qty: 90 CAPSULE | Refills: 0 | Status: SHIPPED | OUTPATIENT
Start: 2022-02-28 | End: 2022-05-31

## 2022-03-01 ENCOUNTER — PHYSICAL THERAPY (OUTPATIENT)
Dept: PHYSICAL THERAPY | Facility: REHABILITATION | Age: 67
End: 2022-03-01
Attending: FAMILY MEDICINE
Payer: MEDICARE

## 2022-03-01 DIAGNOSIS — G89.29 CHRONIC PAIN OF RIGHT KNEE: ICD-10-CM

## 2022-03-01 DIAGNOSIS — G89.29 CHRONIC RIGHT SHOULDER PAIN: ICD-10-CM

## 2022-03-01 DIAGNOSIS — M25.551 RIGHT HIP PAIN: ICD-10-CM

## 2022-03-01 DIAGNOSIS — M79.651 RIGHT THIGH PAIN: ICD-10-CM

## 2022-03-01 DIAGNOSIS — M25.511 CHRONIC RIGHT SHOULDER PAIN: ICD-10-CM

## 2022-03-01 DIAGNOSIS — M25.561 CHRONIC PAIN OF RIGHT KNEE: ICD-10-CM

## 2022-03-01 DIAGNOSIS — I89.0 LYMPHEDEMA OF LEFT LOWER EXTREMITY: ICD-10-CM

## 2022-03-01 PROCEDURE — 97535 SELF CARE MNGMENT TRAINING: CPT

## 2022-03-01 PROCEDURE — 97110 THERAPEUTIC EXERCISES: CPT

## 2022-03-01 PROCEDURE — 97161 PT EVAL LOW COMPLEX 20 MIN: CPT

## 2022-03-01 NOTE — OP THERAPY EVALUATION
"  Outpatient Physical Therapy  LYMPHEDEMA THERAPY INITIAL EVALUATION    [facility] Physical [address].  [address]  [place] [address]  Phone:  [phone]  Fax:  [phone]    Date of Evaluation: 03/01/2022    Patient: Dorcas Michael  YOB: 1955  MRN: [id number]     Referring Provider: [address] [address]   Referring Diagnosis Right thigh pain [M79.651];Lymphedema of left lower extremity [I89.0];Chronic pain of right knee [M25.561, G89.29];Right hip pain [M25.551];Chronic right shoulder pain [M25.511, G89.29]     Time Calculation    Start time: 0945  Stop time: 1030 Time Calculation (min): 45 minutes             Chief Complaint: Lymphedema Congenital    Visit Diagnoses     ICD-10-CM   1. Right thigh pain  M79.651   2. Lymphedema of left lower extremity  I89.0   3. Chronic pain of right knee  M25.561    G89.29   4. Right hip pain  M25.551   5. Chronic right shoulder pain  M25.511    G89.29       Subjective:   History of Present Illness:     Mechanism of injury:  Pt reports she has had lymphedema since her early 40's. Has a pump and compression stockings. She reports she takes Percocet. She also has difficulty wearing good-fitting shoes and tends to hit her feet on things, injuring herself. She would like to know \"how to fall\". Pt reports she would like to know what she can do at home to help relieve her leg as well as shoulder.   Patient Goals:     Patient goals for therapy:  Decreased edema and increased strength      Past Medical History:   Diagnosis Date   • Arthritis     both hands   • Lymphedema 1995    bilateral legs   • Pain     left knee 4/10   • Pain     hands, feet   • Pain     left hand     Past Surgical History:   Procedure Laterality Date   • VENTRAL HERNIA REPAIR LAPAROSCOPIC  2/16/2021    Procedure: REPAIR, HERNIA, VENTRAL, LAPAROSCOPIC - UMBILICAL;  Surgeon: Lilliana Mejia M.D.;  Location: SURGERY SAME DAY " [facility];  Service: General   • HERNIA REPAIR  02/16/2021    Umbilical    • FINGER ARTHROPLASTY Left 8/22/2017    Procedure: FINGER ARTHROPLASTY - CARPAL METACARPAL;  Surgeon: Magnus Anderson M.D.;  Location: [facility];  Service:    • TENDON TRANSFER Left 8/22/2017    Procedure: TENDON TRANSFER - FLEXI CARPI RADIALIS;  Surgeon: Magnus Anderson M.D.;  Location: [facility];  Service:    • CAPSULOTOMY  8/22/2017    Procedure: CAPSULOTOMY - METACARPAL PHALANGEAL JOINT CAPSULODESIS;  Surgeon: Magnus Anderson M.D.;  Location: [facility];  Service:    • FINGER ARTHROPLASTY Right 9/20/2016    Procedure: FINGER ARTHROPLASTY - CARPAL METACARPAL;  Surgeon: Magnus Anderson M.D.;  Location: [facility];  Service:    • TENDON TRANSFER Right 9/20/2016    Procedure: TENDON TRANSFER - FLEXI CARPI RADIALIS;  Surgeon: Magnus Anderson M.D.;  Location: [facility];  Service:    • PIN INSERTION Right 9/20/2016    Procedure: PIN INSERTION - METACARPAL PHALANGEAL JOINT;  Surgeon: Magnus Anderson M.D.;  Location: [facility];  Service:    • KNEE MANIPULATION  3/1/2010    Performed by OSGOOD, PATRICK J at [facility]   • KNEE ARTHROPLASTY TOTAL  11/3/2009    Performed by OSGOOD, PATRICK J at [facility]   • ACHILLES TENDON REP Right 5/2005    right   • KNEE ARTHROPLASTY TOTAL Right 12/2003    right   • KNEE ARTHROSCOPY Right 5/1998    right   • RECTOCELE REPAIR  6/1990    repair cystocele   • KNEE ARTHROTOMY Left 1973    left lateral meniscectomy   • KNEE ARTHROTOMY Right 1971    right lateral meniscectomy     Social History     Tobacco Use   • Smoking status: Never Smoker   • Smokeless tobacco: Never Used   Substance Use Topics   • Alcohol use: Yes     Alcohol/week: 0.6 - 1.2 oz     Types: 1 - 2 Glasses of wine per week     Comment: 1-2 per day     Family and Occupational History     Socioeconomic History   • Marital status:       Spouse name: Not on file   • Number of children: Not on file   • Years of education: Not on file   • Highest education level: Associate degree: occupational, technical, or vocational program   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        Lymphedema Objective    Left Lower Extremity Circumferential Measurements  Waist: cm  Hip: cm  Scrotum: cm  Ground/Upper Thigh: cm  Mid Thigh: cm  Knee: 44.2 cm  Upper Calf: 48.6 cm  Mid Calf: 44.5 cm  Ankle: 35.3 cm  Heel to Foot: 26.3 cm  Total: 198.9 cm    Right Lower Extremity Circumferential Measurements  Waist: cm  Hip: cm  Scrotum: cm  Ground/Upper Thigh: cm  Mid Thigh: cm  Knee: 43.7 cm  Upper Calf: 46.7 cm  Mid Calf: 36.5 cm  Ankle: 26.4 cm  Heel to Foot: 22.4 cm  Total: 175.7 cm    Lymphedema Stage  Stage 2 Lymphedema          Therapeutic Exercises (CPT 41831):       Therapeutic Exercise Summary: HEP: provided patient with handout and demo of shoulder I, Y, T, scapular rows, ER with band with low scap engagement.   Discussed LE exercises patient is already performing including bicycle, pickle ball, swimming.       Time-based treatments/modalities:    Physical Therapy Timed Treatment Charges  Therapeutic exercise minutes (CPT 25219): 15 minutes      Assessment and Plan:   Functional Impairments: swelling    Assessment details:  Pt is a 65yo F who has been suffering from L LE lymphedema for the past 40years. Onset insidious and more pronounced in L LE. Pt does have a hx of bilateral LE veinous ablation after onset of swelling as well as a hx of knee replacements and trauma to L LE. It does sound as though patient may have congenital lymphedema as she has no significant hx that would indicate otherwise. She has been managing her swelling with a pump and compression, however, her L LE remains very uncomfortable at her ankle while wearing compression and she cannot tolerate closed-toed shoes. Her current garment appears to be a circular knit and upon visual  inspection, is too large. However, pt reports she cannot tolerate a smaller size due to discomfort at ankle. Pt has undergone CDT in years past and is not interested in resuming at this point. Discussed custom L LE garment with patient as she is likely not being contained with the current fabric as well as compression class (pt unsure which compression class she is wearing). Patient will be seen to obtain an appropriate garment that will contain her swelling and prevent ongoing congestion. This will likely include a custom, flat knit thigh-high stocking in either 20-30mmHg or 30-40mmHg if patient can tolerate.  Without a proper garment, she will continue to swell. Pt has verbalized understanding to this education and is agreeable to proceed.   Prognosis: fair      Goals:   Short Term Goals:  1. Pt will perform LE activity including cycling and walking while wearing her current compression garment to assist in reduction to L LE.   2. Pt will obtain an appropriate compression garment and wear during the day to assist in decongestion of L LE.   Short term goal time span:  2-4 weeks    Long Term Goals:  1. Patient will be independent with maintenance phase management of lymphedema including: compression garments, skin care education, risk reduction strategies, manual lymphatic drainage, and therapeutic exercise.  Long term goal time span:  4-6 weeks    Plan:  Therapy options:  Physical therapy treatment to continue  Planned therapy interventions:  Addressing equipment needs, caregiver education, decongestive exercises, compression stocking, home exercise program, intermittent compression, manual lymph drainage, myofascial release techniques, orthotic measurements/fitting, patient education, postural exercises, range of motion exercises, self-care/training (CPT 29696), sequential compression pump, skin/wound care, soft tissue manual techniques (CPT 76934) and strengthening exercises  Planned education:  Functional  anatomy and physiology of the lymphatic system, pathophysiology of lymphedema, lymphedema exercise, lymphedema precautions, proper skin care/nutrition, compression bandaging, self massage, infection prevention, scar tissue management, activity guidelines, skin care guidelines, dietary guidelines, home pump use, bandage removal and long term self-management of lymphedema  Frequency:  1x week  Duration in weeks:  8  Discussed with:  Patient      Functional Assessment Used    LLIS    Referring provider co-signature:  I have reviewed this plan of care and my co-signature certifies the need for services.    Certification Period: 03/01/2022 to  04/26/22    Physician Signature: ________________________________ Date: ______________

## 2022-03-08 ENCOUNTER — APPOINTMENT (OUTPATIENT)
Dept: PHYSICAL THERAPY | Facility: REHABILITATION | Age: 67
End: 2022-03-08
Attending: FAMILY MEDICINE
Payer: MEDICARE

## 2022-03-10 ENCOUNTER — PHYSICAL THERAPY (OUTPATIENT)
Dept: PHYSICAL THERAPY | Facility: REHABILITATION | Age: 67
End: 2022-03-10
Attending: FAMILY MEDICINE
Payer: MEDICARE

## 2022-03-10 DIAGNOSIS — I89.0 LYMPHEDEMA OF LEFT LOWER EXTREMITY: ICD-10-CM

## 2022-03-10 PROCEDURE — 97535 SELF CARE MNGMENT TRAINING: CPT

## 2022-03-10 NOTE — OP THERAPY DAILY TREATMENT
Outpatient Physical Therapy  LYMPHEDEMA THERAPY DAILY TREATMENT     [facility] Physical Therapy [address]  [address]  [place] [address]  Phone:  [phone]  Fax:  [phone]    Date: 03/10/2022    Patient: Dorcas Michael  YOB: 1955  MRN: [id number]     Time Calculation    Start time: 1550  Stop time: 1632 Time Calculation (min): 42 minutes         Chief Complaint: Lymphedema Lymphedectomy    Visit #: 2    Subjective:   History of Present Illness:     Mechanism of injury:  Pt reporting her L LE remains largely swollen. Pump does not seem to be helping.       Lymphedema Objective         Left Lower Extremity Circumferential Measurements 3/1  Waist: cm  Hip: cm  Scrotum: cm  Ground/Upper Thigh: cm  Mid Thigh: cm  Knee: 44.2 cm  Upper Calf: 48.6 cm  Mid Calf: 44.5 cm  Ankle: 35.3 cm  Heel to Foot: 26.3 cm  Total: 198.9 cm     Right Lower Extremity Circumferential Measurements 3/1  Waist: cm  Hip: cm  Scrotum: cm  Ground/Upper Thigh: cm  Mid Thigh: cm  Knee: 43.7 cm  Upper Calf: 46.7 cm  Mid Calf: 36.5 cm  Ankle: 26.4 cm  Heel to Foot: 22.4 cm  Total: 175.7 cm    Therapeutic Treatments and Modalities:     1. Functional Training, Self Care (CPT 80575)    Therapeutic Treatment and Modalities Summary: Assisted pt with measuring L LE for custom L LE garment. She will benefit from flat knit for containment as her current circular knit garment does not offer enough containment and has allowed for return of swelling.     Time-based treatments/modalities:    Physical Therapy Timed Treatment Charges  Functional training, self care minutes (CPT 94868): 42 minutes      Assessment and Plan:   Assessment details:  Pt is suffering from ongoing swelling to L LE. She requires a custom garment to allow for the maximal containment possible as well as to allow for reduction and prohibit return of fluid. She will benefit from follow up to ensure appropriate sizing and fit once she obtains her  lizeth.     Plan:  Therapy options:  Physical therapy treatment to continue  Discussed with:  Patient

## 2022-03-15 ENCOUNTER — APPOINTMENT (OUTPATIENT)
Dept: PHYSICAL THERAPY | Facility: REHABILITATION | Age: 67
End: 2022-03-15
Attending: FAMILY MEDICINE
Payer: MEDICARE

## 2022-03-17 ENCOUNTER — APPOINTMENT (OUTPATIENT)
Dept: PHYSICAL THERAPY | Facility: REHABILITATION | Age: 67
End: 2022-03-17
Attending: FAMILY MEDICINE
Payer: MEDICARE

## 2022-03-22 ENCOUNTER — APPOINTMENT (OUTPATIENT)
Dept: PHYSICAL THERAPY | Facility: REHABILITATION | Age: 67
End: 2022-03-22
Attending: FAMILY MEDICINE
Payer: MEDICARE

## 2022-03-24 ENCOUNTER — APPOINTMENT (OUTPATIENT)
Dept: PHYSICAL THERAPY | Facility: REHABILITATION | Age: 67
End: 2022-03-24
Attending: FAMILY MEDICINE
Payer: MEDICARE

## 2022-03-29 ENCOUNTER — APPOINTMENT (OUTPATIENT)
Dept: PHYSICAL THERAPY | Facility: REHABILITATION | Age: 67
End: 2022-03-29
Attending: FAMILY MEDICINE
Payer: MEDICARE

## 2022-03-31 ENCOUNTER — APPOINTMENT (OUTPATIENT)
Dept: PHYSICAL THERAPY | Facility: REHABILITATION | Age: 67
End: 2022-03-31
Attending: FAMILY MEDICINE
Payer: MEDICARE

## 2022-04-05 ENCOUNTER — OFFICE VISIT (OUTPATIENT)
Dept: MEDICAL GROUP | Facility: IMAGING CENTER | Age: 67
End: 2022-04-05
Payer: MEDICARE

## 2022-04-05 VITALS
OXYGEN SATURATION: 94 % | RESPIRATION RATE: 12 BRPM | BODY MASS INDEX: 37.18 KG/M2 | HEIGHT: 69 IN | SYSTOLIC BLOOD PRESSURE: 114 MMHG | TEMPERATURE: 96.1 F | DIASTOLIC BLOOD PRESSURE: 62 MMHG | HEART RATE: 71 BPM | WEIGHT: 251 LBS

## 2022-04-05 DIAGNOSIS — I89.0 LYMPHEDEMA OF LEFT LOWER EXTREMITY: ICD-10-CM

## 2022-04-05 PROCEDURE — 99214 OFFICE O/P EST MOD 30 MIN: CPT | Performed by: FAMILY MEDICINE

## 2022-04-05 PROCEDURE — 99000 SPECIMEN HANDLING OFFICE-LAB: CPT | Performed by: FAMILY MEDICINE

## 2022-04-05 RX ORDER — OXYCODONE HYDROCHLORIDE AND ACETAMINOPHEN 5; 325 MG/1; MG/1
1 TABLET ORAL
Qty: 30 TABLET | Refills: 0 | Status: SHIPPED | OUTPATIENT
Start: 2022-04-05 | End: 2022-08-08 | Stop reason: SDUPTHER

## 2022-04-05 ASSESSMENT — FIBROSIS 4 INDEX: FIB4 SCORE: 1.05

## 2022-04-05 NOTE — PROGRESS NOTES
Chief Complaint   Patient presents with   • Medication Refill     HPI:   With a history of   Patient Active Problem List   Diagnosis   • Lymphedema of left lower extremity   • History of bilateral knee replacement   • Bilateral primary osteoarthritis of first carpometacarpal joints   • Obesity (BMI 30-39.9)   • Primary arthrosis of first carpometacarpal joints, bilateral   • Umbilical hernia without obstruction and without gangrene   • Achilles tendinitis of left lower extremity   • Right thigh pain   • Recurrent UTI     Here for lymphedema pain management. She has been doing well w 30 tabs percocet 6 months. Even less since seeing lymphedema clinic. Going on road trip and a cruise soon.  No changes in vision shortness of breath or chest pain.    No Known Allergies  Current Outpatient Medications   Medication Sig Dispense Refill   • oxyCODONE-acetaminophen (PERCOCET) 5-325 MG Tab Take 1 Tablet by mouth 1 time a day as needed for up to 90 days. 30 Tablet 0   • celecoxib (CELEBREX) 200 MG Cap TAKE 1 CAPSULE BY MOUTH EVERY DAY 90 Capsule 0   • Probiotic Product (PROBIOTIC BLEND PO)      • nystatin (MYCOSTATIN) 632373 UNIT/GM Cream topical cream APPLY 1 GM TO AFFECTED AREA(S) 2 TIMES A DAY. 90 g 2   • B Complex Vitamins (VITAMIN B COMPLEX PO) every day.     • Cholecalciferol (VITAMIN D3) 5000 UNITS Cap Take 1 Cap by mouth every day.     • FIBER PO Take  by mouth every day.     • diphenhydrAMINE (BENADRYL) 25 MG Tab Take 25 mg by mouth every 6 hours as needed for Sleep.       No current facility-administered medications for this visit.     Social History     Tobacco Use   • Smoking status: Never Smoker   • Smokeless tobacco: Never Used   Vaping Use   • Vaping Use: Never used   Substance Use Topics   • Alcohol use: Yes     Alcohol/week: 0.6 - 1.2 oz     Types: 1 - 2 Glasses of wine per week     Comment: 1-2 per day   • Drug use: No     Family History   Problem Relation Age of Onset   • Heart Disease Other    • Other  "Mother         MS   • Thyroid Daughter    • GI Disease Other         Crohns   • Thyroid Sister        /62 (BP Location: Right arm, Patient Position: Sitting, BP Cuff Size: Large adult)   Pulse 71   Temp (!) 35.6 °C (96.1 °F) (Temporal)   Resp 12   Ht 1.753 m (5' 9\")   Wt 114 kg (251 lb)   SpO2 94%  Body mass index is 37.07 kg/m².  Review of Systems   Constitutional: Negative for chills, fever and malaise/fatigue.   HENT: Negative for hearing loss and tinnitus.    Eyes: Negative for double vision and pain.   Respiratory: Negative for cough, shortness of breath and wheezing.    Cardiovascular: Negative for chest pain, palpitations and leg swelling.   Gastrointestinal: Negative for abdominal pain, diarrhea, nausea and vomiting.   Genitourinary: Negative for dysuria and frequency.   Musculoskeletal: Negative for joint pain and myalgias.   Skin: Negative for itching and rash.   Neurological: Negative for dizziness and headaches.     Physical Exam  Constitutional:       General: He is not in acute distress.     Appearance: He is not diaphoretic.   HENT:      Head: Normocephalic and atraumatic.   Eyes:      General: No scleral icterus.        Right eye: No discharge.         Left eye: No discharge.      Conjunctiva/sclera: Conjunctivae normal.      Pupils: Pupils are equal, round, and reactive to light.   Neck:      Thyroid: No thyromegaly.   Pulmonary:      Effort: Pulmonary effort is normal. No respiratory distress.   Abdominal:      General: There is no distension.   Skin:     General: Skin is warm and dry.   Neurological:      Mental Status: He is alert.   Psychiatric:         Mood and Affect: Affect normal.         Judgment: Judgment normal.         All labs (last 1 month):   No visits with results within 1 Month(s) from this visit.   Latest known visit with results is:   Hospital Outpatient Visit on 01/26/2022   Component Date Value Ref Range Status   • Cholesterol,Tot 01/26/2022 197  100 - 199 mg/dL " Final   • Triglycerides 01/26/2022 37  0 - 149 mg/dL Final   • HDL 01/26/2022 89  >=40 mg/dL Final   • LDL 01/26/2022 101 (A) <100 mg/dL Final   • Sodium 01/26/2022 137  135 - 145 mmol/L Final   • Potassium 01/26/2022 4.2  3.6 - 5.5 mmol/L Final   • Chloride 01/26/2022 100  96 - 112 mmol/L Final   • Co2 01/26/2022 26  20 - 33 mmol/L Final   • Anion Gap 01/26/2022 11.0  7.0 - 16.0 Final   • Glucose 01/26/2022 97  65 - 99 mg/dL Final   • Bun 01/26/2022 23 (A) 8 - 22 mg/dL Final   • Creatinine 01/26/2022 0.64  0.50 - 1.40 mg/dL Final   • Calcium 01/26/2022 9.8  8.4 - 10.2 mg/dL Final   • AST(SGOT) 01/26/2022 17  12 - 45 U/L Final   • ALT(SGPT) 01/26/2022 20  2 - 50 U/L Final   • Alkaline Phosphatase 01/26/2022 63  30 - 99 U/L Final   • Total Bilirubin 01/26/2022 1.0  0.1 - 1.5 mg/dL Final   • Albumin 01/26/2022 4.3  3.2 - 4.9 g/dL Final   • Total Protein 01/26/2022 7.0  6.0 - 8.2 g/dL Final   • Globulin 01/26/2022 2.7  1.9 - 3.5 g/dL Final   • A-G Ratio 01/26/2022 1.6  g/dL Final   • WBC 01/26/2022 7.9  4.8 - 10.8 K/uL Final   • RBC 01/26/2022 4.57  4.20 - 5.40 M/uL Final   • Hemoglobin 01/26/2022 14.8  12.0 - 16.0 g/dL Final   • Hematocrit 01/26/2022 44.4  37.0 - 47.0 % Final   • MCV 01/26/2022 97.2  81.4 - 97.8 fL Final   • MCH 01/26/2022 32.4  27.0 - 33.0 pg Final   • MCHC 01/26/2022 33.3 (A) 33.6 - 35.0 g/dL Final   • RDW 01/26/2022 47.8  35.9 - 50.0 fL Final   • Platelet Count 01/26/2022 243  164 - 446 K/uL Final   • MPV 01/26/2022 10.1  9.0 - 12.9 fL Final   • Neutrophils-Polys 01/26/2022 66.30  44.00 - 72.00 % Final   • Lymphocytes 01/26/2022 24.50  22.00 - 41.00 % Final   • Monocytes 01/26/2022 6.60  0.00 - 13.40 % Final   • Eosinophils 01/26/2022 1.40  0.00 - 6.90 % Final   • Basophils 01/26/2022 0.80  0.00 - 1.80 % Final   • Immature Granulocytes 01/26/2022 0.40  0.00 - 0.90 % Final   • Nucleated RBC 01/26/2022 0.00  /100 WBC Final   • Neutrophils (Absolute) 01/26/2022 5.25  2.00 - 7.15 K/uL Final    Includes  immature neutrophils, if present.   • Lymphs (Absolute) 01/26/2022 1.94  1.00 - 4.80 K/uL Final   • Monos (Absolute) 01/26/2022 0.52  0.00 - 0.85 K/uL Final   • Eos (Absolute) 01/26/2022 0.11  0.00 - 0.51 K/uL Final   • Baso (Absolute) 01/26/2022 0.06  0.00 - 0.12 K/uL Final   • Immature Granulocytes (abs) 01/26/2022 0.03  0.00 - 0.11 K/uL Final   • NRBC (Absolute) 01/26/2022 0.00  K/uL Final   • Fasting Status 01/26/2022 Fasting   Final   • GFR If  01/26/2022 >60  >60 mL/min/1.73 m 2 Final   • GFR If Non  01/26/2022 >60  >60 mL/min/1.73 m 2 Final       Lipids:   Lab Results   Component Value Date/Time    CHOLSTRLTOT 197 01/26/2022 01:34 PM    TRIGLYCERIDE 37 01/26/2022 01:34 PM    HDL 89 01/26/2022 01:34 PM     (H) 01/26/2022 01:34 PM       Imaging: No results found.    A/P  pdmp reviewed  Return in about 6 months (around 10/5/2022).    Problem List Items Addressed This Visit     Lymphedema of left lower extremity    Relevant Medications    oxyCODONE-acetaminophen (PERCOCET) 5-325 MG Tab    Other Relevant Orders    Controlled Substance Treatment Agreement    PAIN MANAGEMENT SCRN, UR          Portions of this note may be dictated using [employer] voice recognition software.  Variances in spelling and vocabulary are possible and unintentional.  Not all areas may be caught/corrected.  Please notify me if any discrepancies are noted or if the meaning of any statement is not correct/clear.

## 2022-04-06 ENCOUNTER — APPOINTMENT (OUTPATIENT)
Dept: PHYSICAL THERAPY | Facility: REHABILITATION | Age: 67
End: 2022-04-06
Attending: FAMILY MEDICINE
Payer: COMMERCIAL

## 2022-04-12 DIAGNOSIS — I89.0 LYMPHEDEMA OF LEFT LOWER EXTREMITY: ICD-10-CM

## 2022-04-18 ENCOUNTER — PHYSICAL THERAPY (OUTPATIENT)
Dept: PHYSICAL THERAPY | Facility: REHABILITATION | Age: 67
End: 2022-04-18
Attending: FAMILY MEDICINE
Payer: MEDICARE

## 2022-04-18 DIAGNOSIS — I89.0 LYMPHEDEMA OF LEFT LEG: ICD-10-CM

## 2022-04-18 PROCEDURE — 97535 SELF CARE MNGMENT TRAINING: CPT

## 2022-04-18 NOTE — OP THERAPY DAILY TREATMENT
Outpatient Physical Therapy  LYMPHEDEMA THERAPY DAILY TREATMENT     [facility] Physical Therapy [address].  [address]  [place] [address]  Phone:  [phone]  Fax:  [phone]    Date: 04/18/2022    Patient: Dorcas Michael  YOB: 1955  MRN: [id number]     Time Calculation    Start time: 1116  Stop time: 1133 Time Calculation (min): 17 minutes         Chief Complaint: Lymphedema Lymphedectomy    Visit #: 3    Subjective:   History of Present Illness:     Mechanism of injury:  Received garment; too loose proximally      Lymphedema Objective         Left Lower Extremity Circumferential Measurements 3/1  Waist: cm  Hip: cm  Scrotum: cm  Ground/Upper Thigh: cm  Mid Thigh: cm  Knee: 44.2 cm  Upper Calf: 48.6 cm  Mid Calf: 44.5 cm  Ankle: 35.3 cm  Heel to Foot: 26.3 cm  Total: 198.9 cm     Right Lower Extremity Circumferential Measurements 3/1  Waist: cm  Hip: cm  Scrotum: cm  Ground/Upper Thigh: cm  Mid Thigh: cm  Knee: 43.7 cm  Upper Calf: 46.7 cm  Mid Calf: 36.5 cm  Ankle: 26.4 cm  Heel to Foot: 22.4 cm  Total: 175.7 cm    Therapeutic Treatments and Modalities:     1. Functional Training, Self Care (CPT 99591)    Therapeutic Treatment and Modalities Summary: Evaluated legging for sizing concerns. Requires adjustments at base of toes, proximal thigh, and leg length.     Time-based treatments/modalities:    Physical Therapy Timed Treatment Charges  Functional training, self care minutes (CPT 46855): 17 minutes      Assessment and Plan:   Assessment details:  Proximal aspect of Dorcas's custom thigh garment too loose and is not high enough for adequate compression. She will benefit from an additional visit to evaluate her re-make.

## 2022-05-18 ENCOUNTER — PHYSICAL THERAPY (OUTPATIENT)
Dept: PHYSICAL THERAPY | Facility: REHABILITATION | Age: 67
End: 2022-05-18
Attending: FAMILY MEDICINE
Payer: MEDICARE

## 2022-05-18 DIAGNOSIS — I89.0 LYMPHEDEMA OF LEFT LEG: ICD-10-CM

## 2022-05-18 PROCEDURE — 97535 SELF CARE MNGMENT TRAINING: CPT

## 2022-05-18 NOTE — OP THERAPY PROGRESS SUMMARY
Outpatient Physical Therapy  PROGRESS SUMMARY NOTE      [facility] Therapy [address].  [address]  [place] [address]  Phone:  [phone]  Fax:  [phone]    Date of Visit: 05/18/2022    Patient: Dorcas Michael  YOB: 1955  MRN: [id number]     Referring Provider: Theresa Villanueva M.D.  [address] [place] [address]   Referring Diagnosis Pain in right thigh [M79.651];Lymphedema, not elsewhere classified [I89.0];Pain in right knee [M25.561];Other chronic pain [G89.29];Pain in right hip [M25.551];Pain in right shoulder [M25.511];Other chronic pain [G89.29]     Visit Diagnoses     ICD-10-CM   1. Lymphedema of left leg  I89.0       Rehab Potential: good    Progress Report Period: 3/1-5/18/22        Objective Findings and Assessment:   Patient progression towards goals: Pt is being seen to allow her to manage her L LE lymphedema. She has been measured for a custom flat knit garment to allow for maximal containment as well as compression. Her most recent garment is slightly too big at this time. She will benefit from 1-2 visits more to ensure proper fit so she can manage swelling long-term.       Goals:   Short Term Goals:    1. Pt will perform LE activity including cycling and walking while wearing her current compression garment to assist in reduction to L LE. Goal Met  2. Pt will obtain an appropriate compression garment and wear during the day to assist in decongestion of L LE. Goal Partially Met  Short term goal time span:  2-4 weeks      Long Term Goals:    1. Patient will be independent with maintenance phase management of lymphedema including: compression garments, skin care education, risk reduction strategies, manual lymphatic drainage, and therapeutic exercise. Goal Not Met  Long term goal time span:  4-6 weeks    Plan:   Planned therapy interventions:  Manual Therapy (CPT 77585), Self Care ADL Training (CPT 37867), Therapeutic Activities (CPT 97275)  and Therapeutic Exercise (CPT 21701)  Frequency:  1x month  Duration in weeks:  8      Referring provider co-signature:  I have reviewed this plan of care and my co-signature certifies the need for services.     Certification Period: 05/18/2022 to 07/13/22    Physician Signature: ________________________________ Date: ______________

## 2022-05-18 NOTE — OP THERAPY DAILY TREATMENT
Outpatient Physical Therapy  LYMPHEDEMA THERAPY DAILY TREATMENT     [facility] Physical Therapy [address].  [address]  [place] [address]  Phone:  [phone]  Fax:  [phone]    Date: 05/18/2022    Patient: Dorcas Michael  YOB: 1955  MRN: [id number]     Time Calculation    Start time: 1330  Stop time: 1347 Time Calculation (min): 17 minutes         Chief Complaint: Lymphedema Aquired    Visit #: 4    Subjective:   History of Present Illness:     Mechanism of injury:  Received new legging. Feels too loose.       Lymphedema Objective    Left Lower Extremity Circumferential Measurements  Waist: cm  Hip: cm  Scrotum: cm  Ground/Upper Thigh: cm  Mid Thigh: cm  Knee: 44.4 cm  Upper Calf: 47.2 cm  Mid Calf: 45.1 cm  Ankle: 36 cm  Heel to Foot: 26.1 cm  Total: 198.8 cm      Left Lower Extremity Circumferential Measurements 3/1  Waist: cm  Hip: cm  Scrotum: cm  Ground/Upper Thigh: cm  Mid Thigh: cm  Knee: 44.2 cm  Upper Calf: 48.6 cm  Mid Calf: 44.5 cm  Ankle: 35.3 cm  Heel to Foot: 26.3 cm  Total: 198.9 cm        Therapeutic Treatments and Modalities:     1. Self Care ADL Training (CPT 82045)    Therapeutic Treatment and Modalities Summary: Evaluated new legging. Re-measured in problem areas. Discussed plan with patient who is in agreement.     Time-based treatments/modalities:    Physical Therapy Timed Treatment Charges  Functional training, self care minutes (CPT 74117): 17 minutes      Assessment and Plan:   Functional Impairments: swelling    Assessment details:  Pt is managing with pump and current legging, however, it is ill-fitting. APpears to be too loose. Will submit for remake.   Discussed with:  Patient

## 2022-05-24 ENCOUNTER — APPOINTMENT (OUTPATIENT)
Dept: PHYSICAL THERAPY | Facility: REHABILITATION | Age: 67
End: 2022-05-24
Attending: FAMILY MEDICINE
Payer: MEDICARE

## 2022-05-30 DIAGNOSIS — M18.0 PRIMARY ARTHROSIS OF FIRST CARPOMETACARPAL JOINTS, BILATERAL: ICD-10-CM

## 2022-05-31 RX ORDER — CELECOXIB 200 MG/1
200 CAPSULE ORAL
Qty: 90 CAPSULE | Refills: 0 | Status: SHIPPED | OUTPATIENT
Start: 2022-05-31 | End: 2022-09-09

## 2022-06-15 ENCOUNTER — APPOINTMENT (OUTPATIENT)
Dept: PHYSICAL THERAPY | Facility: REHABILITATION | Age: 67
End: 2022-06-15
Payer: COMMERCIAL

## 2022-07-27 ENCOUNTER — TELEMEDICINE (OUTPATIENT)
Dept: MEDICAL GROUP | Facility: IMAGING CENTER | Age: 67
End: 2022-07-27
Payer: MEDICARE

## 2022-07-27 VITALS
TEMPERATURE: 97.3 F | OXYGEN SATURATION: 97 % | SYSTOLIC BLOOD PRESSURE: 135 MMHG | HEART RATE: 67 BPM | WEIGHT: 245 LBS | BODY MASS INDEX: 36.29 KG/M2 | DIASTOLIC BLOOD PRESSURE: 93 MMHG | HEIGHT: 69 IN

## 2022-07-27 DIAGNOSIS — N30.00 ACUTE CYSTITIS WITHOUT HEMATURIA: ICD-10-CM

## 2022-07-27 PROCEDURE — 99213 OFFICE O/P EST LOW 20 MIN: CPT | Mod: 95 | Performed by: FAMILY MEDICINE

## 2022-07-27 RX ORDER — SULFAMETHOXAZOLE AND TRIMETHOPRIM 800; 160 MG/1; MG/1
1 TABLET ORAL EVERY 12 HOURS
Qty: 6 TABLET | Refills: 0 | Status: SHIPPED | OUTPATIENT
Start: 2022-07-27 | End: 2022-07-30

## 2022-07-27 ASSESSMENT — FIBROSIS 4 INDEX: FIB4 SCORE: 1.05

## 2022-07-27 ASSESSMENT — ENCOUNTER SYMPTOMS
DOUBLE VISION: 0
ABDOMINAL PAIN: 0
VOMITING: 0
MYALGIAS: 0
DIARRHEA: 0
COUGH: 0
FLANK PAIN: 0
NAUSEA: 0
SHORTNESS OF BREATH: 0
EYE PAIN: 0
WHEEZING: 0
PALPITATIONS: 0
HEADACHES: 0
FEVER: 0
DIZZINESS: 0
CHILLS: 0

## 2022-07-27 ASSESSMENT — PAIN SCALES - GENERAL: PAINLEVEL: NO PAIN

## 2022-07-27 NOTE — PROGRESS NOTES
Telemedicine Visit: New Patient     This encounter was conducted via Zoom.   Verbal consent was obtained. Patient's identity was verified. Patient aware this will be   billed the same as an in person evaluation.  Patient in home, I am in office at [address] [place] [address]  Subjective:     Chief Complaint   Patient presents with   • Urinary Frequency     Saw dispatch health beginning of July, no improvement       Dorcas Michael is a 66 y.o. female with history of   Patient Active Problem List   Diagnosis   • Lymphedema of left lower extremity   • History of bilateral knee replacement   • Bilateral primary osteoarthritis of first carpometacarpal joints   • Obesity (BMI 30-39.9)   • Primary arthrosis of first carpometacarpal joints, bilateral   • Umbilical hernia without obstruction and without gangrene   • Achilles tendinitis of left lower extremity   • Right thigh pain   • Recurrent UTI      on virtual visit to discuss uti. Polyuria, pressure subrapubic. She took an at home test for urine. Both LE and nitrites positive. Also has covid so on virtual visit. Symptoms started 5 days ago. previously had dispatch come out and urine grew e coli greater than 100,000 so given cephelixin q6hrs for 7 days. Symptoms completely resolved. Drinking cranberry juice and water. Had a series of utis last year with three in a row. She started probiotics and started urinating when she needed to instead of holding it. She still doing these things. She did recently travel one week prior camping.     Review of Systems   Constitutional: Negative for chills, fever and malaise/fatigue.   HENT: Negative for hearing loss and tinnitus.    Eyes: Negative for double vision and pain.   Respiratory: Negative for cough, shortness of breath and wheezing.    Cardiovascular: Negative for chest pain, palpitations and leg swelling.   Gastrointestinal: Negative for abdominal pain, diarrhea, nausea and vomiting.   Genitourinary: Positive for  frequency and urgency. Negative for dysuria, flank pain and hematuria.   Musculoskeletal: Negative for joint pain and myalgias.   Skin: Negative for itching and rash.   Neurological: Negative for dizziness and headaches.         No Known Allergies    Current medicines (including changes today)  Current Outpatient Medications   Medication Sig Dispense Refill   • sulfamethoxazole-trimethoprim (BACTRIM DS) 800-160 MG tablet Take 1 Tablet by mouth every 12 hours for 3 days. 6 Tablet 0   • celecoxib (CELEBREX) 200 MG Cap TAKE 1 CAPSULE BY MOUTH EVERY DAY 90 Capsule 0   • Probiotic Product (PROBIOTIC BLEND PO)      • nystatin (MYCOSTATIN) 069597 UNIT/GM Cream topical cream APPLY 1 GM TO AFFECTED AREA(S) 2 TIMES A DAY. 90 g 2   • B Complex Vitamins (VITAMIN B COMPLEX PO) every day.     • Cholecalciferol (VITAMIN D3) 5000 UNITS Cap Take 1 Cap by mouth every day.     • FIBER PO Take  by mouth every day.     • diphenhydrAMINE (BENADRYL) 25 MG Tab Take 25 mg by mouth every 6 hours as needed for Sleep.       No current facility-administered medications for this visit.       She  has a past medical history of Arthritis, Lymphedema (1995), Pain, Pain, and Pain.  She  has a past surgical history that includes knee arthrotomy (Right, 1971); knee arthrotomy (Left, 1973); rectocele repair (6/1990); knee arthroscopy (Right, 5/1998); knee arthroplasty total (Right, 12/2003); achilles tendon rep (Right, 5/2005); knee arthroplasty total (11/3/2009); knee manipulation (3/1/2010); finger arthroplasty (Right, 9/20/2016); tendon transfer (Right, 9/20/2016); pin insertion (Right, 9/20/2016); finger arthroplasty (Left, 8/22/2017); tendon transfer (Left, 8/22/2017); capsulotomy (8/22/2017); ventral hernia repair laparoscopic (2/16/2021); and hernia repair (02/16/2021).      Family History   Problem Relation Age of Onset   • Heart Disease Other    • Other Mother         MS   • Thyroid Daughter    • GI Disease Other         Crohns   • Thyroid  "Sister      Family Status   Relation Name Status   • OTHER  (Not Specified)   • Mo  (Not Specified)   • Ramesh  (Not Specified)   • OTHER  (Not Specified)   • Sis  (Not Specified)       Patient Active Problem List    Diagnosis Date Noted   • Right thigh pain 11/10/2021   • Recurrent UTI 11/10/2021   • Umbilical hernia without obstruction and without gangrene 05/20/2020   • Achilles tendinitis of left lower extremity 05/20/2020   • Primary arthrosis of first carpometacarpal joints, bilateral 08/22/2017   • Obesity (BMI 30-39.9) 06/29/2017   • Bilateral primary osteoarthritis of first carpometacarpal joints 09/20/2016   • Lymphedema of left lower extremity 06/09/2016   • History of bilateral knee replacement 06/09/2016          Objective:   Vitals obtained by patient:  BP (!) 135/93   Pulse 67   Temp 36.3 °C (97.3 °F)   Ht 1.753 m (5' 9\")   Wt 111 kg (245 lb)   SpO2 97%   BMI 36.18 kg/m²     Physical Exam:  Constitutional: Alert, no distress, well-groomed.  Skin: No rashes in visible areas.  Eye: Round. Conjunctiva clear, lids normal. No icterus.   ENMT: Lips pink without lesions, good dentition, moist mucous membranes. Phonation normal.  Neck: No masses, no thyromegaly. Moves freely without pain.  CV: Pulse as reported by patient  Respiratory: Unlabored respiratory effort, no cough or audible wheeze  Psych: Alert and oriented x3, normal affect and mood.   Neuro: symmetric face. Alert and oriented. Follows commands. No aphasia or dysarthria.    Labs:  No visits with results within 1 Month(s) from this visit.   Latest known visit with results is:   Hospital Outpatient Visit on 01/26/2022   Component Date Value Ref Range Status   • Cholesterol,Tot 01/26/2022 197  100 - 199 mg/dL Final   • Triglycerides 01/26/2022 37  0 - 149 mg/dL Final   • HDL 01/26/2022 89  >=40 mg/dL Final   • LDL 01/26/2022 101 (A) <100 mg/dL Final   • Sodium 01/26/2022 137  135 - 145 mmol/L Final   • Potassium 01/26/2022 4.2  3.6 - 5.5 mmol/L " Final   • Chloride 01/26/2022 100  96 - 112 mmol/L Final   • Co2 01/26/2022 26  20 - 33 mmol/L Final   • Anion Gap 01/26/2022 11.0  7.0 - 16.0 Final   • Glucose 01/26/2022 97  65 - 99 mg/dL Final   • Bun 01/26/2022 23 (A) 8 - 22 mg/dL Final   • Creatinine 01/26/2022 0.64  0.50 - 1.40 mg/dL Final   • Calcium 01/26/2022 9.8  8.4 - 10.2 mg/dL Final   • AST(SGOT) 01/26/2022 17  12 - 45 U/L Final   • ALT(SGPT) 01/26/2022 20  2 - 50 U/L Final   • Alkaline Phosphatase 01/26/2022 63  30 - 99 U/L Final   • Total Bilirubin 01/26/2022 1.0  0.1 - 1.5 mg/dL Final   • Albumin 01/26/2022 4.3  3.2 - 4.9 g/dL Final   • Total Protein 01/26/2022 7.0  6.0 - 8.2 g/dL Final   • Globulin 01/26/2022 2.7  1.9 - 3.5 g/dL Final   • A-G Ratio 01/26/2022 1.6  g/dL Final   • WBC 01/26/2022 7.9  4.8 - 10.8 K/uL Final   • RBC 01/26/2022 4.57  4.20 - 5.40 M/uL Final   • Hemoglobin 01/26/2022 14.8  12.0 - 16.0 g/dL Final   • Hematocrit 01/26/2022 44.4  37.0 - 47.0 % Final   • MCV 01/26/2022 97.2  81.4 - 97.8 fL Final   • MCH 01/26/2022 32.4  27.0 - 33.0 pg Final   • MCHC 01/26/2022 33.3 (A) 33.6 - 35.0 g/dL Final   • RDW 01/26/2022 47.8  35.9 - 50.0 fL Final   • Platelet Count 01/26/2022 243  164 - 446 K/uL Final   • MPV 01/26/2022 10.1  9.0 - 12.9 fL Final   • Neutrophils-Polys 01/26/2022 66.30  44.00 - 72.00 % Final   • Lymphocytes 01/26/2022 24.50  22.00 - 41.00 % Final   • Monocytes 01/26/2022 6.60  0.00 - 13.40 % Final   • Eosinophils 01/26/2022 1.40  0.00 - 6.90 % Final   • Basophils 01/26/2022 0.80  0.00 - 1.80 % Final   • Immature Granulocytes 01/26/2022 0.40  0.00 - 0.90 % Final   • Nucleated RBC 01/26/2022 0.00  /100 WBC Final   • Neutrophils (Absolute) 01/26/2022 5.25  2.00 - 7.15 K/uL Final    Includes immature neutrophils, if present.   • Lymphs (Absolute) 01/26/2022 1.94  1.00 - 4.80 K/uL Final   • Monos (Absolute) 01/26/2022 0.52  0.00 - 0.85 K/uL Final   • Eos (Absolute) 01/26/2022 0.11  0.00 - 0.51 K/uL Final   • Baso (Absolute)  01/26/2022 0.06  0.00 - 0.12 K/uL Final   • Immature Granulocytes (abs) 01/26/2022 0.03  0.00 - 0.11 K/uL Final   • NRBC (Absolute) 01/26/2022 0.00  K/uL Final   • Fasting Status 01/26/2022 Fasting   Final   • GFR If  01/26/2022 >60  >60 mL/min/1.73 m 2 Final   • GFR If Non  01/26/2022 >60  >60 mL/min/1.73 m 2 Final       Imaging:   No results found.    Assessment and Plan:   The following treatment plan was discussed:   Problem List Items Addressed This Visit    None     Visit Diagnoses     Acute cystitis without hematuria        Relevant Medications    sulfamethoxazole-trimethoprim (BACTRIM DS) 800-160 MG tablet          Follow-up: Return in about 2 weeks (around 8/10/2022).        Portions of this note may be dictated using Dragon NaturallySpeaking voice recognition software.  Variances in spelling and vocabulary are possible and unintentional.  Not all areas may be caught/corrected.  Please notify me if any discrepancies are noted or if the meaning of any statement is not correct/clear.

## 2022-08-01 ENCOUNTER — TELEPHONE (OUTPATIENT)
Dept: HEALTH INFORMATION MANAGEMENT | Facility: OTHER | Age: 67
End: 2022-08-01

## 2022-08-08 ENCOUNTER — OFFICE VISIT (OUTPATIENT)
Dept: MEDICAL GROUP | Facility: IMAGING CENTER | Age: 67
End: 2022-08-08
Payer: MEDICARE

## 2022-08-08 VITALS
TEMPERATURE: 98.2 F | RESPIRATION RATE: 18 BRPM | HEART RATE: 80 BPM | OXYGEN SATURATION: 97 % | SYSTOLIC BLOOD PRESSURE: 120 MMHG | WEIGHT: 245 LBS | HEIGHT: 69 IN | DIASTOLIC BLOOD PRESSURE: 78 MMHG | BODY MASS INDEX: 36.29 KG/M2

## 2022-08-08 DIAGNOSIS — I89.0 LYMPHEDEMA OF LEFT LOWER EXTREMITY: ICD-10-CM

## 2022-08-08 DIAGNOSIS — L98.9 SKIN LESION: ICD-10-CM

## 2022-08-08 PROCEDURE — 99213 OFFICE O/P EST LOW 20 MIN: CPT | Performed by: FAMILY MEDICINE

## 2022-08-08 RX ORDER — OXYCODONE HYDROCHLORIDE AND ACETAMINOPHEN 5; 325 MG/1; MG/1
1 TABLET ORAL
Qty: 30 TABLET | Refills: 0 | Status: SHIPPED | OUTPATIENT
Start: 2022-08-08 | End: 2023-04-18 | Stop reason: SDUPTHER

## 2022-08-08 ASSESSMENT — FIBROSIS 4 INDEX: FIB4 SCORE: 1.05

## 2022-08-08 NOTE — PROGRESS NOTES
Chief Complaint   Patient presents with   • Follow-Up     UTI   • Medication Refill     Percocet   • Referral Needed     Dermatology for skin lesions     HPI:   With a history of   Patient Active Problem List   Diagnosis   • Lymphedema of left lower extremity   • History of bilateral knee replacement   • Bilateral primary osteoarthritis of first carpometacarpal joints   • Obesity (BMI 30-39.9)   • Primary arthrosis of first carpometacarpal joints, bilateral   • Umbilical hernia without obstruction and without gangrene   • Achilles tendinitis of left lower extremity   • Right thigh pain   • Recurrent UTI     uti concerns have resolved   Bumps on face and arms  Wart right pointer finger    Requesting refill of percocet for her lymphedema. Controlled sub agreement 4/22  She says she only takes it when she travels. Urine drug screen April 2022 consistent with self reported use of the medication. Has continue with recs from lymphedema clinic.   No Known Allergies  Current Outpatient Medications   Medication Sig Dispense Refill   • oxyCODONE-acetaminophen (PERCOCET) 5-325 MG Tab Take 1 Tablet by mouth 1 time a day as needed (Pain) for up to 90 days. 30 Tablet 0   • celecoxib (CELEBREX) 200 MG Cap TAKE 1 CAPSULE BY MOUTH EVERY DAY 90 Capsule 0   • Probiotic Product (PROBIOTIC BLEND PO)      • nystatin (MYCOSTATIN) 806957 UNIT/GM Cream topical cream APPLY 1 GM TO AFFECTED AREA(S) 2 TIMES A DAY. 90 g 2   • B Complex Vitamins (VITAMIN B COMPLEX PO) every day.     • Cholecalciferol (VITAMIN D3) 5000 UNITS Cap Take 1 Cap by mouth every day.     • FIBER PO Take  by mouth every day.     • diphenhydrAMINE (BENADRYL) 25 MG Tab Take 25 mg by mouth every 6 hours as needed for Sleep.       No current facility-administered medications for this visit.     Social History     Tobacco Use   • Smoking status: Never Smoker   • Smokeless tobacco: Never Used   Vaping Use   • Vaping Use: Never used   Substance Use Topics   • Alcohol use: Yes     " Alcohol/week: 0.6 - 1.2 oz     Types: 1 - 2 Glasses of wine per week     Comment: 1-2 per day   • Drug use: No     Family History   Problem Relation Age of Onset   • Heart Disease Other    • Other Mother         MS   • Thyroid Daughter    • GI Disease Other         Crohns   • Thyroid Sister        /78 (BP Location: Right arm, Patient Position: Sitting, BP Cuff Size: Large adult)   Pulse 80   Temp 36.8 °C (98.2 °F) (Temporal)   Resp 18   Ht 1.753 m (5' 9\")   Wt 111 kg (245 lb)   SpO2 97%  Body mass index is 36.18 kg/m².  Review of Systems   Constitutional: Negative for chills, fever and malaise/fatigue.   HENT: Negative for hearing loss and tinnitus.    Eyes: Negative for double vision and pain.   Respiratory: Negative for cough, shortness of breath and wheezing.    Cardiovascular: Negative for chest pain, palpitations and leg swelling.   Gastrointestinal: Negative for abdominal pain, diarrhea, nausea and vomiting.   Genitourinary: Negative for dysuria and frequency.   Musculoskeletal: Negative for joint pain and myalgias.   Skin: Negative for itching and rash.   Neurological: Negative for dizziness and headaches.     Physical Exam  Constitutional:       General: She is not in acute distress.     Appearance: She is not diaphoretic.   HENT:      Head: Normocephalic and atraumatic.   Eyes:      General: No scleral icterus.        Right eye: No discharge.         Left eye: No discharge.      Conjunctiva/sclera: Conjunctivae normal.      Pupils: Pupils are equal, round, and reactive to light.   Neck:      Thyroid: No thyromegaly.   Cardiovascular:      Rate and Rhythm: Normal rate and regular rhythm.      Heart sounds: Normal heart sounds. No murmur heard.    No friction rub. No gallop.   Pulmonary:      Effort: Pulmonary effort is normal. No respiratory distress.      Breath sounds: Normal breath sounds. No wheezing or rales.   Abdominal:      General: There is no distension.   Musculoskeletal:      " Comments: Swelling left leg with compression sock   Skin:     General: Skin is warm and dry.      Comments: Multiple scaly lesions w red base on face and arm   Neurological:      Mental Status: She is alert and oriented to person, place, and time.   Psychiatric:         Mood and Affect: Affect normal.         Judgment: Judgment normal.             All labs (last 1 month):   No visits with results within 1 Month(s) from this visit.   Latest known visit with results is:   Hospital Outpatient Visit on 01/26/2022   Component Date Value Ref Range Status   • Cholesterol,Tot 01/26/2022 197  100 - 199 mg/dL Final   • Triglycerides 01/26/2022 37  0 - 149 mg/dL Final   • HDL 01/26/2022 89  >=40 mg/dL Final   • LDL 01/26/2022 101 (A) <100 mg/dL Final   • Sodium 01/26/2022 137  135 - 145 mmol/L Final   • Potassium 01/26/2022 4.2  3.6 - 5.5 mmol/L Final   • Chloride 01/26/2022 100  96 - 112 mmol/L Final   • Co2 01/26/2022 26  20 - 33 mmol/L Final   • Anion Gap 01/26/2022 11.0  7.0 - 16.0 Final   • Glucose 01/26/2022 97  65 - 99 mg/dL Final   • Bun 01/26/2022 23 (A) 8 - 22 mg/dL Final   • Creatinine 01/26/2022 0.64  0.50 - 1.40 mg/dL Final   • Calcium 01/26/2022 9.8  8.4 - 10.2 mg/dL Final   • AST(SGOT) 01/26/2022 17  12 - 45 U/L Final   • ALT(SGPT) 01/26/2022 20  2 - 50 U/L Final   • Alkaline Phosphatase 01/26/2022 63  30 - 99 U/L Final   • Total Bilirubin 01/26/2022 1.0  0.1 - 1.5 mg/dL Final   • Albumin 01/26/2022 4.3  3.2 - 4.9 g/dL Final   • Total Protein 01/26/2022 7.0  6.0 - 8.2 g/dL Final   • Globulin 01/26/2022 2.7  1.9 - 3.5 g/dL Final   • A-G Ratio 01/26/2022 1.6  g/dL Final   • WBC 01/26/2022 7.9  4.8 - 10.8 K/uL Final   • RBC 01/26/2022 4.57  4.20 - 5.40 M/uL Final   • Hemoglobin 01/26/2022 14.8  12.0 - 16.0 g/dL Final   • Hematocrit 01/26/2022 44.4  37.0 - 47.0 % Final   • MCV 01/26/2022 97.2  81.4 - 97.8 fL Final   • MCH 01/26/2022 32.4  27.0 - 33.0 pg Final   • MCHC 01/26/2022 33.3 (A) 33.6 - 35.0 g/dL Final   •  RDW 01/26/2022 47.8  35.9 - 50.0 fL Final   • Platelet Count 01/26/2022 243  164 - 446 K/uL Final   • MPV 01/26/2022 10.1  9.0 - 12.9 fL Final   • Neutrophils-Polys 01/26/2022 66.30  44.00 - 72.00 % Final   • Lymphocytes 01/26/2022 24.50  22.00 - 41.00 % Final   • Monocytes 01/26/2022 6.60  0.00 - 13.40 % Final   • Eosinophils 01/26/2022 1.40  0.00 - 6.90 % Final   • Basophils 01/26/2022 0.80  0.00 - 1.80 % Final   • Immature Granulocytes 01/26/2022 0.40  0.00 - 0.90 % Final   • Nucleated RBC 01/26/2022 0.00  /100 WBC Final   • Neutrophils (Absolute) 01/26/2022 5.25  2.00 - 7.15 K/uL Final    Includes immature neutrophils, if present.   • Lymphs (Absolute) 01/26/2022 1.94  1.00 - 4.80 K/uL Final   • Monos (Absolute) 01/26/2022 0.52  0.00 - 0.85 K/uL Final   • Eos (Absolute) 01/26/2022 0.11  0.00 - 0.51 K/uL Final   • Baso (Absolute) 01/26/2022 0.06  0.00 - 0.12 K/uL Final   • Immature Granulocytes (abs) 01/26/2022 0.03  0.00 - 0.11 K/uL Final   • NRBC (Absolute) 01/26/2022 0.00  K/uL Final   • Fasting Status 01/26/2022 Fasting   Final   • GFR If  01/26/2022 >60  >60 mL/min/1.73 m 2 Final   • GFR If Non  01/26/2022 >60  >60 mL/min/1.73 m 2 Final       Lipids:   Lab Results   Component Value Date/Time    CHOLSTRLTOT 197 01/26/2022 01:34 PM    TRIGLYCERIDE 37 01/26/2022 01:34 PM    HDL 89 01/26/2022 01:34 PM     (H) 01/26/2022 01:34 PM       Imaging: No results found.    A/P  Will send to derm for what looks like AK. Discussed nature of these lesions.   Urine drug screen and controlled substance agreement on file  Return if symptoms worsen or fail to improve.    Problem List Items Addressed This Visit     Lymphedema of left lower extremity    Relevant Medications    oxyCODONE-acetaminophen (PERCOCET) 5-325 MG Tab      Other Visit Diagnoses     Skin lesion        Relevant Orders    Referral to Dermatology          Portions of this note may be dictated using Dragon NaturallySpeaking  voice recognition software.  Variances in spelling and vocabulary are possible and unintentional.  Not all areas may be caught/corrected.  Please notify me if any discrepancies are noted or if the meaning of any statement is not correct/clear.

## 2022-08-12 ENCOUNTER — TELEPHONE (OUTPATIENT)
Dept: MEDICAL GROUP | Facility: IMAGING CENTER | Age: 67
End: 2022-08-12
Payer: MEDICARE

## 2022-09-09 DIAGNOSIS — E66.9 OBESITY (BMI 30-39.9): ICD-10-CM

## 2022-09-20 ENCOUNTER — OFFICE VISIT (OUTPATIENT)
Dept: DERMATOLOGY | Facility: IMAGING CENTER | Age: 67
End: 2022-09-20
Payer: MEDICARE

## 2022-09-20 ENCOUNTER — HOSPITAL ENCOUNTER (OUTPATIENT)
Dept: LAB | Facility: MEDICAL CENTER | Age: 67
End: 2022-09-20
Attending: CLINICAL NURSE SPECIALIST
Payer: MEDICARE

## 2022-09-20 DIAGNOSIS — E66.9 OBESITY (BMI 30-39.9): ICD-10-CM

## 2022-09-20 DIAGNOSIS — L57.0 ACTINIC KERATOSIS: ICD-10-CM

## 2022-09-20 DIAGNOSIS — B07.9 VERRUCAS: ICD-10-CM

## 2022-09-20 DIAGNOSIS — L85.3 XEROSIS CUTIS: ICD-10-CM

## 2022-09-20 DIAGNOSIS — L82.1 SK (SEBORRHEIC KERATOSIS): ICD-10-CM

## 2022-09-20 LAB
25(OH)D3 SERPL-MCNC: 89 NG/ML (ref 30–100)
ALBUMIN SERPL BCP-MCNC: 4.4 G/DL (ref 3.2–4.9)
ALBUMIN/GLOB SERPL: 1.7 G/DL
ALP SERPL-CCNC: 62 U/L (ref 30–99)
ALT SERPL-CCNC: 21 U/L (ref 2–50)
ANION GAP SERPL CALC-SCNC: 8 MMOL/L (ref 7–16)
AST SERPL-CCNC: 19 U/L (ref 12–45)
BASOPHILS # BLD AUTO: 0.9 % (ref 0–1.8)
BASOPHILS # BLD: 0.05 K/UL (ref 0–0.12)
BILIRUB SERPL-MCNC: 1.2 MG/DL (ref 0.1–1.5)
BUN SERPL-MCNC: 14 MG/DL (ref 8–22)
CALCIUM SERPL-MCNC: 9.7 MG/DL (ref 8.4–10.2)
CHLORIDE SERPL-SCNC: 104 MMOL/L (ref 96–112)
CHOLEST SERPL-MCNC: 199 MG/DL (ref 100–199)
CO2 SERPL-SCNC: 27 MMOL/L (ref 20–33)
CREAT SERPL-MCNC: 0.59 MG/DL (ref 0.5–1.4)
EOSINOPHIL # BLD AUTO: 0.09 K/UL (ref 0–0.51)
EOSINOPHIL NFR BLD: 1.6 % (ref 0–6.9)
ERYTHROCYTE [DISTWIDTH] IN BLOOD BY AUTOMATED COUNT: 49.5 FL (ref 35.9–50)
EST. AVERAGE GLUCOSE BLD GHB EST-MCNC: 111 MG/DL
FASTING STATUS PATIENT QL REPORTED: NORMAL
GFR SERPLBLD CREATININE-BSD FMLA CKD-EPI: 98 ML/MIN/1.73 M 2
GLOBULIN SER CALC-MCNC: 2.6 G/DL (ref 1.9–3.5)
GLUCOSE SERPL-MCNC: 105 MG/DL (ref 65–99)
HBA1C MFR BLD: 5.5 % (ref 4–5.6)
HCT VFR BLD AUTO: 44 % (ref 37–47)
HDLC SERPL-MCNC: 84 MG/DL
HGB BLD-MCNC: 14.2 G/DL (ref 12–16)
IMM GRANULOCYTES # BLD AUTO: 0.02 K/UL (ref 0–0.11)
IMM GRANULOCYTES NFR BLD AUTO: 0.4 % (ref 0–0.9)
LDLC SERPL CALC-MCNC: 103 MG/DL
LYMPHOCYTES # BLD AUTO: 1.67 K/UL (ref 1–4.8)
LYMPHOCYTES NFR BLD: 29.5 % (ref 22–41)
MCH RBC QN AUTO: 31.2 PG (ref 27–33)
MCHC RBC AUTO-ENTMCNC: 32.3 G/DL (ref 33.6–35)
MCV RBC AUTO: 96.7 FL (ref 81.4–97.8)
MONOCYTES # BLD AUTO: 0.41 K/UL (ref 0–0.85)
MONOCYTES NFR BLD AUTO: 7.2 % (ref 0–13.4)
NEUTROPHILS # BLD AUTO: 3.42 K/UL (ref 2–7.15)
NEUTROPHILS NFR BLD: 60.4 % (ref 44–72)
NRBC # BLD AUTO: 0 K/UL
NRBC BLD-RTO: 0 /100 WBC
PLATELET # BLD AUTO: 222 K/UL (ref 164–446)
PMV BLD AUTO: 9.7 FL (ref 9–12.9)
POTASSIUM SERPL-SCNC: 4.6 MMOL/L (ref 3.6–5.5)
PROT SERPL-MCNC: 7 G/DL (ref 6–8.2)
RBC # BLD AUTO: 4.55 M/UL (ref 4.2–5.4)
SODIUM SERPL-SCNC: 139 MMOL/L (ref 135–145)
TRIGL SERPL-MCNC: 62 MG/DL (ref 0–149)
TSH SERPL DL<=0.005 MIU/L-ACNC: 1.69 UIU/ML (ref 0.38–5.33)
WBC # BLD AUTO: 5.7 K/UL (ref 4.8–10.8)

## 2022-09-20 PROCEDURE — 83036 HEMOGLOBIN GLYCOSYLATED A1C: CPT

## 2022-09-20 PROCEDURE — 84443 ASSAY THYROID STIM HORMONE: CPT

## 2022-09-20 PROCEDURE — 82306 VITAMIN D 25 HYDROXY: CPT

## 2022-09-20 PROCEDURE — 17000 DESTRUCT PREMALG LESION: CPT | Mod: 59 | Performed by: NURSE PRACTITIONER

## 2022-09-20 PROCEDURE — 85025 COMPLETE CBC W/AUTO DIFF WBC: CPT

## 2022-09-20 PROCEDURE — 99213 OFFICE O/P EST LOW 20 MIN: CPT | Mod: 25 | Performed by: NURSE PRACTITIONER

## 2022-09-20 PROCEDURE — 80061 LIPID PANEL: CPT

## 2022-09-20 PROCEDURE — 17110 DESTRUCTION B9 LES UP TO 14: CPT | Performed by: NURSE PRACTITIONER

## 2022-09-20 PROCEDURE — 17003 DESTRUCT PREMALG LES 2-14: CPT | Mod: 59 | Performed by: NURSE PRACTITIONER

## 2022-09-20 PROCEDURE — 36415 COLL VENOUS BLD VENIPUNCTURE: CPT

## 2022-09-20 PROCEDURE — 80053 COMPREHEN METABOLIC PANEL: CPT

## 2022-09-20 NOTE — PROGRESS NOTES
DERMATOLOGY NOTE  NEW VISIT       Chief complaint: Establish Care and Skin Lesion       HPI/location: face and arms lesions   Time present: lifetime   Painful lesion: No  Itching lesion: No  Enlarging lesion: No  Anything make it better or worse?    Also pt states has been experiencing itchy ness on bilateral arms that come and go just recently broke out in a rash, rash not present today, pt using moisturizer daily, heat maybe makes it worse     History of skin cancer: No  History of precancers/actinic keratoses: No  History of biopsies:No  History of blistering/severe sunburns:No  Family history of skin cancer:No  Family history of atypical moles:No      No Known Allergies     MEDICATIONS:  Medications relevant to specialty reviewed.     REVIEW OF SYSTEMS:   Positive for skin (see HPI)  Negative for fevers and chills       EXAM:  There were no vitals taken for this visit.  Constitutional: Well-developed, well-nourished, and in no distress.     A focused skin exam was performed including the affected areas of the face, LUE, R hand. Notable findings on exam today listed below and/or in assessment/plan.   Ill-defined erythematous gritty/scaly papules over the  L forearm, L shoulder  Verruca lateral aspect of R first finger  Flesh colored waxy stuck on appearing papules to face      IMPRESSION / PLAN:    1. Actinic keratosis  - [facility] education/counseling   CRYOTHERAPY:  Risks (including, but not limited to: skin discoloration, redness, blister, blood blister, recurrence, need for further treatment, infection, scar) and benefits of cryotherapy discussed. Patient verbally agreed to proceed with treatment. 1 cryotherapy freeze thaw cycles of 10 seconds were applied to 2 lesions on areas as noted on exam with cryac. Patient tolerated procedure well. Aftercare instructions given--no specific care needed unless irritated during healing process, can apply Vaseline with small band-aid if needed.      2. Xerosis cutis  Stressed  importance of emollient use, handouts given    3. Verrucas  - Benign-appearing nature of lesions discussed during exam.   CRYOTHERAPY:  Risks (including, but not limited to: skin discoloration, redness, blister, blood blister, recurrence, need for further treatment, infection, scar) and benefits of cryotherapy discussed. Patient verbally agreed to proceed with treatment. 2 cryotherapy freeze thaw cycles of 10 seconds were applied to 1 lesion on R first finger with cryac. Patient tolerated procedure well. Aftercare instructions given--no specific care needed unless irritated during healing process, can apply Vaseline with small band-aid if needed.    - Advised to continue to monitor for any return to clinic for new or concerning changes.      4. SK (seborrheic keratosis)  - Benign-appearing nature of lesions discussed during exam.   - Advised to continue to monitor for any return to clinic for new or concerning changes.        Discussed risks, benefits, alternative treatments as well as common side effects associated with prescribed treatment, Patient verbalized understanding and agrees with plan regarding the above                 Please note that this dictation was created using voice recognition software. I have made every reasonable attempt to correct obvious errors, but I expect that there are errors of grammar and possibly content that I did not discover before finalizing the note.      Return to clinic in: Return for PRN. and as needed for any new or changing skin lesions.

## 2022-11-07 ENCOUNTER — OFFICE VISIT (OUTPATIENT)
Dept: MEDICAL GROUP | Facility: IMAGING CENTER | Age: 67
End: 2022-11-07
Payer: MEDICARE

## 2022-11-07 ENCOUNTER — HOSPITAL ENCOUNTER (OUTPATIENT)
Dept: LAB | Facility: MEDICAL CENTER | Age: 67
End: 2022-11-07
Attending: CLINICAL NURSE SPECIALIST
Payer: MEDICARE

## 2022-11-07 VITALS
TEMPERATURE: 97.5 F | HEIGHT: 69 IN | HEART RATE: 85 BPM | RESPIRATION RATE: 16 BRPM | OXYGEN SATURATION: 97 % | DIASTOLIC BLOOD PRESSURE: 60 MMHG | SYSTOLIC BLOOD PRESSURE: 112 MMHG | BODY MASS INDEX: 37.18 KG/M2 | WEIGHT: 251 LBS

## 2022-11-07 DIAGNOSIS — M18.0 BILATERAL PRIMARY OSTEOARTHRITIS OF FIRST CARPOMETACARPAL JOINTS: ICD-10-CM

## 2022-11-07 DIAGNOSIS — R19.7 DIARRHEA, UNSPECIFIED TYPE: ICD-10-CM

## 2022-11-07 DIAGNOSIS — E66.9 OBESITY (BMI 30-39.9): ICD-10-CM

## 2022-11-07 LAB
ALBUMIN SERPL BCP-MCNC: 4.2 G/DL (ref 3.2–4.9)
ALBUMIN/GLOB SERPL: 1.6 G/DL
ALP SERPL-CCNC: 76 U/L (ref 30–99)
ALT SERPL-CCNC: 21 U/L (ref 2–50)
AMYLASE SERPL-CCNC: 48 U/L (ref 20–103)
ANION GAP SERPL CALC-SCNC: 13 MMOL/L (ref 7–16)
AST SERPL-CCNC: 23 U/L (ref 12–45)
BILIRUB SERPL-MCNC: 0.8 MG/DL (ref 0.1–1.5)
BUN SERPL-MCNC: 20 MG/DL (ref 8–22)
CALCIUM SERPL-MCNC: 9.9 MG/DL (ref 8.5–10.5)
CHLORIDE SERPL-SCNC: 103 MMOL/L (ref 96–112)
CO2 SERPL-SCNC: 24 MMOL/L (ref 20–33)
CREAT SERPL-MCNC: 0.7 MG/DL (ref 0.5–1.4)
GFR SERPLBLD CREATININE-BSD FMLA CKD-EPI: 94 ML/MIN/1.73 M 2
GLOBULIN SER CALC-MCNC: 2.7 G/DL (ref 1.9–3.5)
GLUCOSE SERPL-MCNC: 84 MG/DL (ref 65–99)
LIPASE SERPL-CCNC: 25 U/L (ref 11–82)
POTASSIUM SERPL-SCNC: 4.2 MMOL/L (ref 3.6–5.5)
PROT SERPL-MCNC: 6.9 G/DL (ref 6–8.2)
SODIUM SERPL-SCNC: 140 MMOL/L (ref 135–145)

## 2022-11-07 PROCEDURE — 99214 OFFICE O/P EST MOD 30 MIN: CPT | Performed by: CLINICAL NURSE SPECIALIST

## 2022-11-07 PROCEDURE — 82150 ASSAY OF AMYLASE: CPT

## 2022-11-07 PROCEDURE — 36415 COLL VENOUS BLD VENIPUNCTURE: CPT

## 2022-11-07 PROCEDURE — 83690 ASSAY OF LIPASE: CPT

## 2022-11-07 PROCEDURE — 80053 COMPREHEN METABOLIC PANEL: CPT

## 2022-11-07 ASSESSMENT — FIBROSIS 4 INDEX: FIB4 SCORE: 1.27

## 2022-11-07 ASSESSMENT — PAIN SCALES - GENERAL: PAINLEVEL: NO PAIN

## 2022-11-07 NOTE — ASSESSMENT & PLAN NOTE
Dorcas woke up a couple of weeks ago and had diarrhea with multiple movements a day.  Last couple of days less frequency but still loose.  Stool appears greasy. She feels constipated and bloated now.  Takes fiber. Drinks coffee.  Drinks a lot of water.  Exercises by walking up and down stairs and does an elliptical or stationary bike, walks dog.  Cramping when needs to have a BM.  No sharp pain.  No fevers, nausea/vomiting. Some poor appetite. No melena or hematochezia. No recent travel. Taking probiotics.

## 2022-11-07 NOTE — ASSESSMENT & PLAN NOTE
Takes Celebrex daily for arthritis chronically. Stopped Celebrex prior to surgery a year ago and the pain became severe. Resumed and pain is now controlled.

## 2022-11-07 NOTE — ASSESSMENT & PLAN NOTE
Exercises regularly.  Eats vegetables, meat, rare fruit.  Drinks water and coffee, cranberry juice, wine.  No sweets. Rarely fried food.

## 2022-11-07 NOTE — PROGRESS NOTES
Subjective     Dorcas Michael is a 67 y.o. female who presents with Diarrhea (X1week, with constipation as well, cramping )            HPI  Diarrhea  Dorcas woke up a couple of weeks ago and had diarrhea with multiple movements a day.  Last couple of days less frequency but still loose.  Stool appears greasy. She feels constipated and bloated now.  Takes fiber. Drinks coffee.  Drinks a lot of water.  Exercises by walking up and down stairs and does an elliptical or stationary bike, walks dog.  Cramping when needs to have a BM.  No sharp pain.  No fevers, nausea/vomiting. Some poor appetite. No melena or hematochezia. No recent travel. Taking probiotics.    Obesity (BMI 30-39.9)  Exercises regularly.  Eats vegetables, meat, rare fruit.  Drinks water and coffee, cranberry juice, wine.  No sweets. Rarely fried food.     Bilateral primary osteoarthritis of first carpometacarpal joints  Takes Celebrex daily for arthritis chronically. Stopped Celebrex prior to surgery a year ago and the pain became severe. Resumed and pain is now controlled.     ROS  See HPI    No Known Allergies    Current Outpatient Medications on File Prior to Visit   Medication Sig Dispense Refill    celecoxib (CELEBREX) 200 MG Cap TAKE 1 CAPSULE BY MOUTH EVERY DAY 90 Capsule 0    Probiotic Product (PROBIOTIC BLEND PO)       nystatin (MYCOSTATIN) 706155 UNIT/GM Cream topical cream APPLY 1 GM TO AFFECTED AREA(S) 2 TIMES A DAY. 90 g 2    B Complex Vitamins (VITAMIN B COMPLEX PO) every day.      Cholecalciferol (VITAMIN D3) 5000 UNITS Cap Take 1 Capsule by mouth every day.      FIBER PO Take  by mouth every day.      diphenhydrAMINE (BENADRYL) 25 MG Tab Take 1 Tablet by mouth every 6 hours as needed for Sleep.       No current facility-administered medications on file prior to visit.              Objective     /60 (BP Location: Left arm, Patient Position: Sitting, BP Cuff Size: Adult)   Pulse 85   Temp 36.4 °C (97.5 °F) (Temporal)   Resp 16  "  Ht 1.753 m (5' 9\")   Wt 114 kg (251 lb)   SpO2 97%   BMI 37.07 kg/m²      Physical Exam  Constitutional:       General: She is not in acute distress.     Appearance: Normal appearance. She is not ill-appearing, toxic-appearing or diaphoretic.   HENT:      Head: Normocephalic and atraumatic.   Eyes:      Pupils: Pupils are equal, round, and reactive to light.   Cardiovascular:      Rate and Rhythm: Normal rate.   Pulmonary:      Effort: Pulmonary effort is normal.   Skin:     General: Skin is warm and dry.   Neurological:      Mental Status: She is alert and oriented to person, place, and time.      Gait: Gait normal.   Psychiatric:         Mood and Affect: Mood normal.         Behavior: Behavior normal.         Thought Content: Thought content normal.         Judgment: Judgment normal.                           Assessment & Plan      1. Diarrhea, unspecified type  Physical exam unremarkable.  No red flags. Continue probiotics.  Start digestive enzymes.  Get labs.  Continue fiber and hydration.     - Comp Metabolic Panel; Future  - LIPASE; Future  - AMYLASE; Future    2. Obesity (BMI 30-39.9)  Continue to exercise.    3. Bilateral primary osteoarthritis of first carpometacarpal joints  Continue Celebrex          Return if symptoms worsen or fail to improve, for With test results.          "

## 2022-11-10 DIAGNOSIS — I89.0 LYMPHEDEMA OF LEFT LOWER EXTREMITY: ICD-10-CM

## 2022-11-13 NOTE — PROGRESS NOTES
Attempt #:Final    Verify PCP: yes    Communication Preference Obtained: yes      Care Gap Scheduling (Attempt to Schedule EACH Overdue Care Gap!)  Health Maintenance Due   Topic Date Due   • IMM INFLUENZA (1) 09/01/2017 // Declined   • MAMMOGRAM  09/21/2017 // Scheduled       MyChart Activation: already active    
Outcome: Left Message    Please transfer to Patient Outreach Team at [phone] when patient returns call.    WebIZ Checked & Epic Updated:  yes    Attempt # 1      
20

## 2022-12-19 SDOH — HEALTH STABILITY: PHYSICAL HEALTH: ON AVERAGE, HOW MANY MINUTES DO YOU ENGAGE IN EXERCISE AT THIS LEVEL?: 30 MIN

## 2022-12-19 SDOH — ECONOMIC STABILITY: INCOME INSECURITY: IN THE LAST 12 MONTHS, WAS THERE A TIME WHEN YOU WERE NOT ABLE TO PAY THE MORTGAGE OR RENT ON TIME?: NO

## 2022-12-19 SDOH — HEALTH STABILITY: PHYSICAL HEALTH: ON AVERAGE, HOW MANY DAYS PER WEEK DO YOU ENGAGE IN MODERATE TO STRENUOUS EXERCISE (LIKE A BRISK WALK)?: 7 DAYS

## 2022-12-19 SDOH — ECONOMIC STABILITY: FOOD INSECURITY: WITHIN THE PAST 12 MONTHS, THE FOOD YOU BOUGHT JUST DIDN'T LAST AND YOU DIDN'T HAVE MONEY TO GET MORE.: NEVER TRUE

## 2022-12-19 SDOH — ECONOMIC STABILITY: HOUSING INSECURITY: IN THE LAST 12 MONTHS, HOW MANY PLACES HAVE YOU LIVED?: 1

## 2022-12-19 SDOH — ECONOMIC STABILITY: FOOD INSECURITY: WITHIN THE PAST 12 MONTHS, YOU WORRIED THAT YOUR FOOD WOULD RUN OUT BEFORE YOU GOT MONEY TO BUY MORE.: NEVER TRUE

## 2022-12-19 SDOH — ECONOMIC STABILITY: INCOME INSECURITY: HOW HARD IS IT FOR YOU TO PAY FOR THE VERY BASICS LIKE FOOD, HOUSING, MEDICAL CARE, AND HEATING?: NOT HARD AT ALL

## 2022-12-19 ASSESSMENT — SOCIAL DETERMINANTS OF HEALTH (SDOH)
HOW OFTEN DO YOU ATTENT MEETINGS OF THE CLUB OR ORGANIZATION YOU BELONG TO?: MORE THAN 4 TIMES PER YEAR
HOW MANY DRINKS CONTAINING ALCOHOL DO YOU HAVE ON A TYPICAL DAY WHEN YOU ARE DRINKING: 1 OR 2
HOW OFTEN DO YOU GET TOGETHER WITH FRIENDS OR RELATIVES?: THREE TIMES A WEEK
DO YOU BELONG TO ANY CLUBS OR ORGANIZATIONS SUCH AS CHURCH GROUPS UNIONS, FRATERNAL OR ATHLETIC GROUPS, OR SCHOOL GROUPS?: YES
WITHIN THE PAST 12 MONTHS, YOU WORRIED THAT YOUR FOOD WOULD RUN OUT BEFORE YOU GOT THE MONEY TO BUY MORE: NEVER TRUE
HOW OFTEN DO YOU GET TOGETHER WITH FRIENDS OR RELATIVES?: THREE TIMES A WEEK
HOW OFTEN DO YOU HAVE SIX OR MORE DRINKS ON ONE OCCASION: LESS THAN MONTHLY
HOW OFTEN DO YOU ATTEND CHURCH OR RELIGIOUS SERVICES?: NEVER
IN A TYPICAL WEEK, HOW MANY TIMES DO YOU TALK ON THE PHONE WITH FAMILY, FRIENDS, OR NEIGHBORS?: MORE THAN THREE TIMES A WEEK
HOW OFTEN DO YOU HAVE A DRINK CONTAINING ALCOHOL: 4 OR MORE TIMES A WEEK
HOW OFTEN DO YOU ATTEND CHURCH OR RELIGIOUS SERVICES?: NEVER
HOW OFTEN DO YOU ATTENT MEETINGS OF THE CLUB OR ORGANIZATION YOU BELONG TO?: MORE THAN 4 TIMES PER YEAR
IN A TYPICAL WEEK, HOW MANY TIMES DO YOU TALK ON THE PHONE WITH FAMILY, FRIENDS, OR NEIGHBORS?: MORE THAN THREE TIMES A WEEK
DO YOU BELONG TO ANY CLUBS OR ORGANIZATIONS SUCH AS CHURCH GROUPS UNIONS, FRATERNAL OR ATHLETIC GROUPS, OR SCHOOL GROUPS?: YES
HOW HARD IS IT FOR YOU TO PAY FOR THE VERY BASICS LIKE FOOD, HOUSING, MEDICAL CARE, AND HEATING?: NOT HARD AT ALL

## 2022-12-19 ASSESSMENT — LIFESTYLE VARIABLES
HOW MANY STANDARD DRINKS CONTAINING ALCOHOL DO YOU HAVE ON A TYPICAL DAY: 1 OR 2
SKIP TO QUESTIONS 9-10: 0
AUDIT-C TOTAL SCORE: 5
HOW OFTEN DO YOU HAVE SIX OR MORE DRINKS ON ONE OCCASION: LESS THAN MONTHLY
HOW OFTEN DO YOU HAVE A DRINK CONTAINING ALCOHOL: 4 OR MORE TIMES A WEEK

## 2022-12-22 ENCOUNTER — OFFICE VISIT (OUTPATIENT)
Dept: MEDICAL GROUP | Facility: IMAGING CENTER | Age: 67
End: 2022-12-22
Payer: MEDICARE

## 2022-12-22 VITALS
OXYGEN SATURATION: 99 % | DIASTOLIC BLOOD PRESSURE: 80 MMHG | HEIGHT: 69 IN | SYSTOLIC BLOOD PRESSURE: 124 MMHG | RESPIRATION RATE: 16 BRPM | WEIGHT: 252.5 LBS | HEART RATE: 80 BPM | BODY MASS INDEX: 37.4 KG/M2 | TEMPERATURE: 97 F

## 2022-12-22 DIAGNOSIS — M51.36 DDD (DEGENERATIVE DISC DISEASE), LUMBAR: ICD-10-CM

## 2022-12-22 DIAGNOSIS — G89.29 CHRONIC MIDLINE LOW BACK PAIN WITHOUT SCIATICA: ICD-10-CM

## 2022-12-22 DIAGNOSIS — M54.50 CHRONIC MIDLINE LOW BACK PAIN WITHOUT SCIATICA: ICD-10-CM

## 2022-12-22 DIAGNOSIS — I89.0 LYMPHEDEMA OF LEFT LOWER EXTREMITY: ICD-10-CM

## 2022-12-22 PROBLEM — M51.369 DDD (DEGENERATIVE DISC DISEASE), LUMBAR: Status: ACTIVE | Noted: 2022-12-22

## 2022-12-22 PROCEDURE — 99214 OFFICE O/P EST MOD 30 MIN: CPT | Performed by: CLINICAL NURSE SPECIALIST

## 2022-12-22 RX ORDER — OXYCODONE HYDROCHLORIDE AND ACETAMINOPHEN 5; 325 MG/1; MG/1
1 TABLET ORAL
Qty: 30 TABLET | Refills: 0 | Status: SHIPPED | OUTPATIENT
Start: 2022-12-22 | End: 2023-01-21

## 2022-12-22 ASSESSMENT — FIBROSIS 4 INDEX: FIB4 SCORE: 1.54

## 2022-12-22 ASSESSMENT — PAIN SCALES - GENERAL: PAINLEVEL: 4=SLIGHT-MODERATE PAIN

## 2022-12-22 NOTE — ASSESSMENT & PLAN NOTE
Chronic low back pain midline for at least 20 years starting with an injury.  She went to pain management and has had imaging.  She now has arthritis and DDD in her lumbar spine. She manages with Celebrex and core exercises.  Pain now 2/10, worst 6/10 recently intermittently.

## 2022-12-22 NOTE — ASSESSMENT & PLAN NOTE
Currently wearing sock.  Left leg pain at ankle 5/10, aching.  Dorcas reports Celebrex daily helps with pain but she occasionally needs narcotics when pain worsens such as after sitting for long periods.  She has taken Percocet which has helped with the breakthrough pain.

## 2022-12-22 NOTE — PROGRESS NOTES
"Subjective     Dorcas Michael is a 67 y.o. female who presents with Referral Needed ([facility] ) and Medication Management            HPI  Lymphedema of left lower extremity  Currently wearing sock.  Left leg pain at ankle 5/10, aching.  oDrcas reports Celebrex daily helps with pain but she occasionally needs narcotics when pain worsens such as after sitting for long periods.  She has taken Percocet which has helped with the breakthrough pain.      Chronic midline low back pain without sciatica  Chronic low back pain midline for at least 20 years starting with an injury.  She went to pain management and has had imaging.  She now has arthritis and DDD in her lumbar spine. She manages with Celebrex and core exercises.  Pain now 2/10, worst 6/10 recently intermittently.     ROS  See HPI     No Known Allergies    Current Outpatient Medications on File Prior to Visit   Medication Sig Dispense Refill    celecoxib (CELEBREX) 200 MG Cap TAKE 1 CAPSULE BY MOUTH EVERY DAY 90 Capsule 0    Probiotic Product (PROBIOTIC BLEND PO)       nystatin (MYCOSTATIN) 697268 UNIT/GM Cream topical cream APPLY 1 GM TO AFFECTED AREA(S) 2 TIMES A DAY. 90 g 2    B Complex Vitamins (VITAMIN B COMPLEX PO) every day.      Cholecalciferol (VITAMIN D3) 5000 UNITS Cap Take 1 Capsule by mouth every day.      FIBER PO Take  by mouth every day.      diphenhydrAMINE (BENADRYL) 25 MG Tab Take 1 Tablet by mouth every 6 hours as needed for Sleep.       No current facility-administered medications on file prior to visit.         Objective     /80 (BP Location: Left arm, Patient Position: Sitting, BP Cuff Size: Adult)   Pulse 80   Temp 36.1 °C (97 °F) (Temporal)   Resp 16   Ht 1.753 m (5' 9\")   Wt 115 kg (252 lb 8 oz)   SpO2 99%   BMI 37.29 kg/m²      Physical Exam  Constitutional:       General: She is not in acute distress.     Appearance: Normal appearance. She is not ill-appearing, toxic-appearing or diaphoretic.   HENT:      Head: " Normocephalic and atraumatic.   Eyes:      General: No scleral icterus.     Pupils: Pupils are equal, round, and reactive to light.   Cardiovascular:      Rate and Rhythm: Normal rate.   Pulmonary:      Effort: Pulmonary effort is normal.   Musculoskeletal:      Right lower leg: Edema (mild) present.      Left lower leg: Edema (2+) present.   Skin:     General: Skin is warm and dry.   Neurological:      Mental Status: She is alert and oriented to person, place, and time.      Gait: Gait normal.   Psychiatric:         Mood and Affect: Mood normal.         Behavior: Behavior normal.         Thought Content: Thought content normal.         Judgment: Judgment normal.              Lumbar xray 11/2021  IMPRESSION:        1.  Multilevel degenerative disc disease is most pronounced at L3-4, L4-5, and L5-S1.     2.  Moderate facet arthropathy in the lower lumbar spine.     3.  Minimal degenerative retrolisthesis at L3-4.           Assessment & Plan      1. Lymphedema of left lower extremity  Chronic, partially controlled on Celebrex and Percocet. Left leg with 2+ edema today.     CS agreement signed and urine sample obtained. Reviewed opioid screening.    Continue Celebrex daily  Continue Percocet 5-325mg as needed for exacerbations of pain    - Controlled Substance Treatment Agreement  - PAIN MANAGEMENT SCRN, UR; Future  - oxyCODONE-acetaminophen (PERCOCET) 5-325 MG Tab; Take 1 Tablet by mouth 1 time a day as needed (Pain) for up to 30 days.  Dispense: 30 Tablet; Refill: 0    2. DDD (degenerative disc disease), lumbar  Chronic, uncontrolled.     - Referral for Acupuncture    3. Chronic midline low back pain without sciatica  Chronic, partially controlled.  Continue exercises. Referral to acupuncture placed.    - Controlled Substance Treatment Agreement  - PAIN MANAGEMENT SCRN, UR; Future  - Referral for Acupuncture            Return if symptoms worsen or fail to improve, for controlled substance.

## 2023-02-06 ENCOUNTER — HOSPITAL ENCOUNTER (OUTPATIENT)
Facility: MEDICAL CENTER | Age: 68
End: 2023-02-06
Attending: CLINICAL NURSE SPECIALIST
Payer: MEDICARE

## 2023-02-06 ENCOUNTER — OFFICE VISIT (OUTPATIENT)
Dept: MEDICAL GROUP | Facility: IMAGING CENTER | Age: 68
End: 2023-02-06
Payer: MEDICARE

## 2023-02-06 VITALS
RESPIRATION RATE: 16 BRPM | DIASTOLIC BLOOD PRESSURE: 82 MMHG | WEIGHT: 255.6 LBS | TEMPERATURE: 97.6 F | SYSTOLIC BLOOD PRESSURE: 120 MMHG | OXYGEN SATURATION: 99 % | HEIGHT: 69 IN | HEART RATE: 85 BPM | BODY MASS INDEX: 37.86 KG/M2

## 2023-02-06 DIAGNOSIS — N30.01 ACUTE CYSTITIS WITH HEMATURIA: ICD-10-CM

## 2023-02-06 DIAGNOSIS — R35.0 FREQUENCY OF URINATION: ICD-10-CM

## 2023-02-06 LAB
APPEARANCE UR: NORMAL
BILIRUB UR STRIP-MCNC: NEGATIVE MG/DL
COLOR UR AUTO: YELLOW
GLUCOSE UR STRIP.AUTO-MCNC: NEGATIVE MG/DL
KETONES UR STRIP.AUTO-MCNC: NEGATIVE MG/DL
LEUKOCYTE ESTERASE UR QL STRIP.AUTO: NORMAL
NITRITE UR QL STRIP.AUTO: NEGATIVE
PH UR STRIP.AUTO: 7 [PH] (ref 5–8)
PROT UR QL STRIP: NORMAL MG/DL
RBC UR QL AUTO: NORMAL
SP GR UR STRIP.AUTO: 1.02
UROBILINOGEN UR STRIP-MCNC: 1 MG/DL

## 2023-02-06 PROCEDURE — 87186 SC STD MICRODIL/AGAR DIL: CPT

## 2023-02-06 PROCEDURE — 81002 URINALYSIS NONAUTO W/O SCOPE: CPT | Performed by: CLINICAL NURSE SPECIALIST

## 2023-02-06 PROCEDURE — 99213 OFFICE O/P EST LOW 20 MIN: CPT | Performed by: CLINICAL NURSE SPECIALIST

## 2023-02-06 PROCEDURE — 87077 CULTURE AEROBIC IDENTIFY: CPT

## 2023-02-06 PROCEDURE — 87086 URINE CULTURE/COLONY COUNT: CPT

## 2023-02-06 RX ORDER — SULFAMETHOXAZOLE AND TRIMETHOPRIM 800; 160 MG/1; MG/1
1 TABLET ORAL 2 TIMES DAILY
Qty: 6 TABLET | Refills: 0 | Status: SHIPPED | OUTPATIENT
Start: 2023-02-06 | End: 2023-02-23

## 2023-02-06 RX ORDER — NITROFURANTOIN 25; 75 MG/1; MG/1
100 CAPSULE ORAL 2 TIMES DAILY
Qty: 14 CAPSULE | Refills: 0 | Status: SHIPPED | OUTPATIENT
Start: 2023-02-06 | End: 2023-02-06

## 2023-02-06 ASSESSMENT — PAIN SCALES - GENERAL: PAINLEVEL: NO PAIN

## 2023-02-06 ASSESSMENT — FIBROSIS 4 INDEX: FIB4 SCORE: 1.54

## 2023-02-06 ASSESSMENT — PATIENT HEALTH QUESTIONNAIRE - PHQ9: CLINICAL INTERPRETATION OF PHQ2 SCORE: 0

## 2023-02-06 NOTE — PROGRESS NOTES
"Chief Complaint   Patient presents with    UTI     Pressure, took a at home test,        HPI:  Symptom onset: 3 days ago   Current symptoms: Uncomfortable, mildly urgent, frequent voids. Pressure in bladder area.  No blood noted in urine.  Since onset symptoms are: Unchanged  Treatments tried:  Drinks cranberry juice and hydrating well  Associated symptoms: Negative for fever, flank pain, nausea and vomiting, vaginal discharge, pelvic pain.  History is positive for frequent UTI.     ROS:  Denies fever, chills, vomiting or abdominal pain.     OBJECTIVE:  /82 (BP Location: Right arm, Patient Position: Sitting, BP Cuff Size: Adult)   Pulse 85   Temp 36.4 °C (97.6 °F) (Temporal)   Resp 16   Ht 1.753 m (5' 9\")   Wt 116 kg (255 lb 9.6 oz)   SpO2 99%   Gen: Alert, NAD.  Chest: Lungs clear to auscultation, CV RRR.  Abdomen: Soft, nontender in suprapubic region. No CVAT.      Lab Results   Component Value Date    POCCOLOR Dark Yellow 11/02/2021    POCAPPEAR Cloudy 11/02/2021    POCLEUKEST Small 11/02/2021    POCNITRITE Neg 11/02/2021    POCUROBILIGE 0.2 11/02/2021    POCPROTEIN Neg 11/02/2021    POCURPH 6.0 11/02/2021    POCBLOOD Trace-intact 11/02/2021    POCSPGRV 1.025 11/02/2021    POCKETONES Neg 11/02/2021    POCBILIRUBIN Neg 11/02/2021    POCGLUCUA Neg 11/02/2021          ASSESSMENT/PLAN:     1. Frequency of urination    - POCT Urinalysis-positive  - Urine Culture; Future    2. Acute cystitis with hematuria  Acute.  Hydrate well. Continue cranberry without sugar. OK to take azo.    START Bactrim DS twice daily x3 days    - sulfamethoxazole-trimethoprim (BACTRIM DS) 800-160 MG tablet; Take 1 Tablet by mouth 2 times a day.  Dispense: 6 Tablet; Refill: 0    Return if symptoms worsen or fail to improve.      "

## 2023-02-07 DIAGNOSIS — R35.0 FREQUENCY OF URINATION: ICD-10-CM

## 2023-02-23 ENCOUNTER — OFFICE VISIT (OUTPATIENT)
Dept: MEDICAL GROUP | Facility: IMAGING CENTER | Age: 68
End: 2023-02-23
Payer: MEDICARE

## 2023-02-23 VITALS
SYSTOLIC BLOOD PRESSURE: 114 MMHG | BODY MASS INDEX: 37.74 KG/M2 | DIASTOLIC BLOOD PRESSURE: 68 MMHG | WEIGHT: 254.8 LBS | RESPIRATION RATE: 16 BRPM | OXYGEN SATURATION: 98 % | HEART RATE: 77 BPM | HEIGHT: 69 IN | TEMPERATURE: 86.3 F

## 2023-02-23 DIAGNOSIS — H57.9 ITCHY EYES: ICD-10-CM

## 2023-02-23 DIAGNOSIS — Z00.00 ENCOUNTER FOR MEDICARE ANNUAL WELLNESS EXAM: ICD-10-CM

## 2023-02-23 DIAGNOSIS — M18.0 PRIMARY ARTHROSIS OF FIRST CARPOMETACARPAL JOINTS, BILATERAL: ICD-10-CM

## 2023-02-23 DIAGNOSIS — H11.31 SUBCONJUNCTIVAL BLEED, RIGHT: ICD-10-CM

## 2023-02-23 DIAGNOSIS — Z23 NEED FOR VACCINATION: ICD-10-CM

## 2023-02-23 PROCEDURE — G0439 PPPS, SUBSEQ VISIT: HCPCS | Mod: 25 | Performed by: CLINICAL NURSE SPECIALIST

## 2023-02-23 PROCEDURE — 90677 PCV20 VACCINE IM: CPT | Performed by: CLINICAL NURSE SPECIALIST

## 2023-02-23 PROCEDURE — G0009 ADMIN PNEUMOCOCCAL VACCINE: HCPCS | Performed by: CLINICAL NURSE SPECIALIST

## 2023-02-23 ASSESSMENT — ANXIETY QUESTIONNAIRES
5. BEING SO RESTLESS THAT IT IS HARD TO SIT STILL: NOT AT ALL
GAD7 TOTAL SCORE: 0
6. BECOMING EASILY ANNOYED OR IRRITABLE: NOT AT ALL
7. FEELING AFRAID AS IF SOMETHING AWFUL MIGHT HAPPEN: NOT AT ALL
1. FEELING NERVOUS, ANXIOUS, OR ON EDGE: NOT AT ALL
4. TROUBLE RELAXING: NOT AT ALL
3. WORRYING TOO MUCH ABOUT DIFFERENT THINGS: NOT AT ALL
2. NOT BEING ABLE TO STOP OR CONTROL WORRYING: NOT AT ALL

## 2023-02-23 ASSESSMENT — PATIENT HEALTH QUESTIONNAIRE - PHQ9: CLINICAL INTERPRETATION OF PHQ2 SCORE: 0

## 2023-02-23 ASSESSMENT — FIBROSIS 4 INDEX: FIB4 SCORE: 1.54

## 2023-02-23 NOTE — PROGRESS NOTES
Chief Complaint   Patient presents with    Annual Exam       HPI:  Dorcsa is a 67 y.o. here for Medicare Annual Wellness Visit    Itchy eyes  Itchy upper eyelids for 2 months.  Wears mascara and colors eyebrows.  Uses Brittany face cream.  Hydrocortisone cream has helped some.  Right lateral sclera red since this AM. No pain, change in vision.     Primary arthrosis of first carpometacarpal joints, bilateral  Dx per xray 2008. No c/o worsening pain.      Patient Active Problem List    Diagnosis Date Noted    Itchy eyes 02/23/2023    DDD (degenerative disc disease), lumbar 12/22/2022    Chronic midline low back pain without sciatica 12/22/2022    Diarrhea 11/07/2022    Right thigh pain 11/10/2021    Recurrent UTI 11/10/2021    Umbilical hernia without obstruction and without gangrene 05/20/2020    Achilles tendinitis of left lower extremity 05/20/2020    Primary arthrosis of first carpometacarpal joints, bilateral 08/22/2017    Obesity (BMI 30-39.9) 06/29/2017    Bilateral primary osteoarthritis of first carpometacarpal joints 09/20/2016    Lymphedema of left lower extremity 06/09/2016    History of bilateral knee replacement 06/09/2016       Current Outpatient Medications   Medication Sig Dispense Refill    celecoxib (CELEBREX) 200 MG Cap TAKE 1 CAPSULE BY MOUTH EVERY DAY 90 Capsule 1    Probiotic Product (PROBIOTIC BLEND PO)       nystatin (MYCOSTATIN) 308305 UNIT/GM Cream topical cream APPLY 1 GM TO AFFECTED AREA(S) 2 TIMES A DAY. 90 g 2    B Complex Vitamins (VITAMIN B COMPLEX PO) every day.      Cholecalciferol (VITAMIN D3) 5000 UNITS Cap Take 1 Capsule by mouth every day.      FIBER PO Take  by mouth every day.      diphenhydrAMINE (BENADRYL) 25 MG Tab Take 1 Tablet by mouth every 6 hours as needed for Sleep.       No current facility-administered medications for this visit.        Patient is taking medications as noted in medication list.  Current supplements as per medication list.     Allergies: Patient has no  known allergies.    Current social contact/activities: yes    Is patient current with immunizations? Yes.    She  reports that she has never smoked. She has never used smokeless tobacco. She reports current alcohol use of about 0.6 - 1.2 oz per week. She reports that she does not use drugs.  Counseling given: Not Answered      ROS:    Gait: Uses no assistive device   Ostomy: Nono  Other tubes: No   Amputations: No   Chronic oxygen use No   Last eye exam 1/01/2023  Wears hearing aids: No   : Denies any urinary leakage during the last 6 months    Screening:  UpToDate    Depression Screening  Little interest or pleasure in doing things?  0 - not at allno  Feeling down, depressed, or hopeless? 0 - not at allno  Trouble falling or staying asleep, or sleeping too much? no    Feeling tired or having little energy?  no   Poor appetite or overeating? no    Feeling bad about yourself - or that you are a failure or have let yourself or your family down? no   Trouble concentrating on things, such as reading the newspaper or watching television?    Moving or speaking so slowly that other people could have noticed.  Or the opposite - being so fidgety or restless that you have been moving around a lot more than usual?     Thoughts that you would be better off dead, or of hurting yourself?     Patient Health Questionnaire Score:      If depressive symptoms identified deferred to follow up visit unless specifically addressed in assessment and plan.    Interpretation of PHQ-9 Total Score   Score Severity   1-4 No Depression   5-9 Mild Depression   10-14 Moderate Depression   15-19 Moderately Severe Depression   20-27 Severe Depression    DOMINIQUE-7 Questionnaire    Feeling nervous, anxious, or on edge: Not at all  Not being able to sop or control worrying: Not at all  Worrying too much about different things: Not at all  Trouble relaxing: Not at all  Being so restless that it's hard to sit still: Not at all  Becoming easily annoyed or  irritable: Not at all  Feeling afraid as if something awful might happen: Not at all  Total: 0    Interpretation of DOMINIQUE 7 Total Score   Score Severity :  0-4 No Anxiety   5-9 Mild Anxiety  10-14 Moderate Anxiety  15-21 Severe Anxiety    Screening for Cognitive Impairment  Three Minute Recall (daughter, heaven, mountain)  3 /3    Draw clock face with all 12 numbers and set the hands to show 10 past 11.  She put 10 pass 10     Addendum: Dorcas reports MA asked her to write 11 past 10 which she thought was unusual so she reports that is what she roberto carlos.  No cognitive decline noted during this visit or previous visits.   If cognitive concerns identified, deferred for follow up unless specifically addressed in assessment and plan.    Fall Risk Assessment  Has the patient had two or more falls in the last year or any fall with injury in the last year? no    If fall risk identified, deferred for follow up unless specifically addressed in assessment and plan.    Safety Assessment  Throw rugs on floor.  yes   Handrails on all stairs. yes    Good lighting in all hallways. yes    Difficulty hearing. no    Patient counseled about all safety risks that were identified.    Functional Assessment ADLs  Are there any barriers preventing you from cooking for yourself or meeting nutritional needs?  no .    Are there any barriers preventing you from driving safely or obtaining transportation?  no .    Are there any barriers preventing you from using a telephone or calling for help?no   .    Are there any barriers preventing you from shopping? no  .    Are there any barriers preventing you from taking care of your own finances? no  .    Are there any barriers preventing you from managing your medications?  no .    Are there any barriers preventing you from showering, bathing or dressing yourself?  no .    Are you currently engaging in any exercise or physical activity? yes  .     What is your perception of your health?good   .    Advance  Care Planning  Do you have an Advance Directive, Living Will, Durable Power of , or POLST?not yet                 Health Maintenance Summary            Ordered - IMM PNEUMOCOCCAL VACCINE: 65+ Years (2 - PCV) Ordered on 2/23/2023 11/19/2019  Imm Admin: Pneumococcal polysaccharide vaccine (PPSV-23)              Overdue - COVID-19 Vaccine (4 - Booster for Moderna series) Overdue since 2/7/2022 12/13/2021  Imm Admin: MODERNA SARS-COV-2 VACCINE (12+)    04/10/2021  Imm Admin: MODERNA SARS-COV-2 VACCINE (12+)    03/12/2021  Imm Admin: MODERNA SARS-COV-2 VACCINE (12+)              Postponed - IMM INFLUENZA (1) Postponed until 11/7/2023      No completion history exists for this topic.              Annual Wellness Visit (Every 366 Days) Next due on 2/24/2024 02/23/2023  Visit Dx: Encounter for Medicare annual wellness exam              MAMMOGRAM (Every 2 Years) Next due on 2/28/2024 02/28/2022  MA-SCREENING MAMMO BILAT W/TOMOSYNTHESIS W/CAD    12/21/2020  MA-SCREENING MAMMO BILAT W/TOMOSYNTHESIS W/CAD    09/24/2019  MA-SCREENING MAMMO BILAT W/TOMOSYNTHESIS W/CAD    03/29/2018  MA-MAMMO SCREENING BILAT W/CRISTINA W/CAD    09/21/2016  MA-DIAGNOSTIC DIGITAL MAMMO-UNILAT LEFT    Only the first 5 history entries have been loaded, but more history exists.              BONE DENSITY (Every 5 Years) Next due on 9/24/2024 09/24/2019  DS-BONE DENSITY STUDY (DEXA)    06/25/2013  DS-BONE DENSITY STUDY (DEXA)              COLORECTAL CANCER SCREENING (COLONOSCOPY - Every 5 Years) Next due on 1/20/2026 01/20/2021  REFERRAL TO GI FOR COLONOSCOPY    07/31/2015  REFERRAL TO GI FOR COLONOSCOPY    02/24/2005  REFERRAL TO GI FOR COLONOSCOPY              IMM DTaP/Tdap/Td Vaccine (2 - Td or Tdap) Next due on 11/14/2027 11/14/2017  Imm Admin: Tdap Vaccine              IMM ZOSTER VACCINES (Series Information) Completed      08/09/2022  Imm Admin: Zoster Vaccine Recombinant (RZV) (SHINGRIX)    12/21/2021  Imm  Admin: Zoster Vaccine Recombinant (RZV) (SHINGRIX)    10/01/2014  Imm Admin: Zoster Vaccine Live (ZVL) (Zostavax) - HISTORICAL DATA              IMM HEP B VACCINE (Series Information) Aged Out      No completion history exists for this topic.              IMM MENINGOCOCCAL ACWY VACCINE (Series Information) Aged Out      No completion history exists for this topic.              Discontinued - CERVICAL CANCER SCREENING  Discontinued        Frequency changed to Never automatically (Topic No Longer Applies)    07/06/2021  THINPREP PAP WITH HPV    07/06/2021  Pathology Gynecology Specimen    09/13/2016  THINPREP PAP WITH HPV    09/13/2016  PATHOLOGY GYN SPECIMEN              Discontinued - HEPATITIS C SCREENING  Discontinued      No completion history exists for this topic.                    Patient Care Team:  Abe Ayoub A.PSilvaRSilvaNSilva as PCP - General (Family Medicine)  Karina Silva P.A.-C. as PCP - [facility] Paneled  Alfonso Henry Ass't (Inactive) as    Vein [place]  as Consulting Physician  Matt Torrez O.D. as Consulting Physician (Optometry)  Kristie Escalera, PT (Physical Therapy)    Social History     Tobacco Use    Smoking status: Never    Smokeless tobacco: Never   Vaping Use    Vaping Use: Never used   Substance Use Topics    Alcohol use: Yes     Alcohol/week: 0.6 - 1.2 oz     Types: 1 - 2 Glasses of wine per week     Comment: 1-2 per day    Drug use: No     Family History   Problem Relation Age of Onset    Heart Disease Other     Other Mother         MS    Thyroid Daughter     GI Disease Other         Crohns    Thyroid Sister      She  has a past medical history of Arthritis, Lymphedema (1995), Pain, Pain, and Pain.   Past Surgical History:   Procedure Laterality Date    VENTRAL HERNIA REPAIR LAPAROSCOPIC  2/16/2021    Procedure: REPAIR, HERNIA, VENTRAL, LAPAROSCOPIC - UMBILICAL;  Surgeon: Lilliana Mejia M.D.;  Location: SURGERY SAME DAY [facility];  Service: General  "   HERNIA REPAIR  02/16/2021    Umbilical     FINGER ARTHROPLASTY Left 8/22/2017    Procedure: FINGER ARTHROPLASTY - CARPAL METACARPAL;  Surgeon: Magnus Anderson M.D.;  Location: [facility];  Service:     TENDON TRANSFER Left 8/22/2017    Procedure: TENDON TRANSFER - FLEXI CARPI RADIALIS;  Surgeon: Magnus Anderson M.D.;  Location: [facility];  Service:     CAPSULOTOMY  8/22/2017    Procedure: CAPSULOTOMY - METACARPAL PHALANGEAL JOINT CAPSULODESIS;  Surgeon: Magnus Anderson M.D.;  Location: [facility];  Service:     FINGER ARTHROPLASTY Right 9/20/2016    Procedure: FINGER ARTHROPLASTY - CARPAL METACARPAL;  Surgeon: Magnus Anderson M.D.;  Location: [facility];  Service:     TENDON TRANSFER Right 9/20/2016    Procedure: TENDON TRANSFER - FLEXI CARPI RADIALIS;  Surgeon: Magnus Anderson M.D.;  Location: [facility];  Service:     PIN INSERTION Right 9/20/2016    Procedure: PIN INSERTION - METACARPAL PHALANGEAL JOINT;  Surgeon: Magnus Anderson M.D.;  Location: [facility];  Service:     KNEE MANIPULATION  3/1/2010    Performed by OSGOOD, PATRICK J at [facility]    KNEE ARTHROPLASTY TOTAL  11/3/2009    Performed by OSGOOD, PATRICK J at [facility]    ACHILLES TENDON REP Right 5/2005    right    KNEE ARTHROPLASTY TOTAL Right 12/2003    right    KNEE ARTHROSCOPY Right 5/1998    right    RECTOCELE REPAIR  6/1990    repair cystocele    KNEE ARTHROTOMY Left 1973    left lateral meniscectomy    KNEE ARTHROTOMY Right 1971    right lateral meniscectomy       Exam:   /68 (BP Location: Left arm, Patient Position: Sitting, BP Cuff Size: Adult)   Pulse 77   Temp (!) 30.2 °C (86.3 °F) (Temporal)   Resp 16   Ht 1.753 m (5' 9\")   Wt 116 kg (254 lb 12.8 oz)   SpO2 98%  Body mass index is 37.63 kg/m².    Hearing excellent.    Dentition good  Alert, oriented in no acute distress  Eye contact is good, speech goal " directed, affect calm      Assessment and Plan. The following treatment and monitoring plan is recommended:    1. Encounter for Medicare annual wellness exam  Healthy 67-year-old female with concern of itchy eyes.  Otherwise healthy with great cognition.     2. Itchy eyes  New issue.  STOP wearing makeup for two weeks. Apply vaseline at night and avoid creams with fragrance or acids. Report back if this does not help.    3. Subconjunctival bleed, right  New issue. Right eye with red, thick serpentine area laterally with central yellow/red color.  Most consistent with subconjunctival hemorrhage. Monitor. Should resolve on its own.     4. Need for vaccination    - Pneumococcal Conjugate Vaccine 20-Valent (19 yrs+)    5. Primary arthrosis of first carpometacarpal joints, bilateral  Chronic, controlled. Continue pain medicine as needed.      Services suggested: No services needed at this time  Health Care Screening recommendations as per orders if indicated.  Referrals offered: PT/OT/Nutrition counseling/Behavioral Health/Smoking cessation as per orders if indicated.    Discussion today about general wellness and lifestyle habits:    Prevent falls and reduce trip hazards; Cautioned about securing or removing rugs.  Have a working fire alarm and carbon monoxide detector;   Engage in regular physical activity and social activities.     Follow-up: Return if symptoms worsen or fail to improve, for annual/wellness, controlled substance.

## 2023-02-23 NOTE — ASSESSMENT & PLAN NOTE
Itchy upper eyelids for 2 months.  Wears mascara and colors eyebrows.  Uses Brittany face cream.  Hydrocortisone cream has helped some.  Right lateral sclera red since this AM. No pain, change in vision.

## 2023-02-23 NOTE — ASSESSMENT & PLAN NOTE
"ED Provider Note    Scribed for Oswald Mallory M.D. by Nancy Mariee. 10/10/2018  6:49 PM    Primary care provider: Sourav Stevenson D.O.  Means of arrival: wheel chair  History obtained from: patient and his son  History limited by: patient's dementia    CHIEF COMPLAINT  Chief Complaint   Patient presents with   • Flank Pain     left       HPI  Joshua Nieves is a 84 y.o. male with a history of dementia who presents to the Emergency Department for evaluation of intermittent left sided flank pain which began this morning. Patient reports associated shortness of breath and diffuse abdominal pain. There are no exacerbating or alleviating factors identified at this time. He is currently living in an assisted living facility. No recent falls or known injuries. Per son, the patient has a history of indigestion secondary to \"eating too fast\". No complaints of dysuria, nausea, chest pain, and fevers.     Further HPI is limited secondary to the patient's dementia.     REVIEW OF SYSTEMS  Pertinent positives include left flank pain, abdominal pain, shortness of breath. Pertinent negatives include no dysuria, nausea, chest pain, fevers.     Further ROS is limited secondary to the patient's dementia.     PAST MEDICAL HISTORY   has a past medical history of Abnormal CBC (8/4/2017); Late onset Alzheimer's disease with behavioral disturbance (1/31/2018); and Vitamin D deficiency disease (8/4/2017).    SURGICAL HISTORY  patient denies any surgical history    SOCIAL HISTORY  Social History   Substance Use Topics   • Smoking status: Former Smoker     Quit date: 1/1/1987   • Smokeless tobacco: Never Used   • Alcohol use No      History   Drug Use No       FAMILY HISTORY  No family history noted    CURRENT MEDICATIONS  Current medications can be reviewed in the nurse's note.     ALLERGIES  Allergies   Allergen Reactions   • Spironolactone Diarrhea     Severe diarrhea       PHYSICAL EXAM  VITAL SIGNS: /87   Pulse 75   " Dx per xray 2008. No c/o worsening pain.   "Temp 36.6 °C (97.8 °F)   Resp 17   Ht 1.753 m (5' 9\")   Wt 90.7 kg (200 lb)   SpO2 95%   BMI 29.53 kg/m²     Constitutional: Well developed, Well nourished, mild distress, Non-toxic appearance.   HENT: Normocephalic, Atraumatic, Bilateral external ears normal, Oropharynx moist, No oral exudates.   Eyes: PERRLA, EOMI, Conjunctiva normal, No discharge.   Neck: No tenderness, Supple, No stridor.   Lymphatic: No lymphadenopathy noted.   Cardiovascular: Normal heart rate, Normal rhythm.   Thorax & Lungs: Clear to auscultation bilaterally, No respiratory distress, No wheezing, No crackles.   Abdomen: Soft, diffuse abdominal tenderness more prominent to his left side, distended abdomen, gastric band noted to the left upper quadrant, No masses, No pulsatile masses.   Skin: Warm, Dry, No erythema, No rash.   Extremities:, No edema, No cyanosis.   Musculoskeletal: No major deformities noted.  Intact distal pulses  Neurologic: Awake, alert, confused, agitated. Moves all extremities spontaneously.  Psychiatric: Patient appears confused, agitated.      LABS  Results for orders placed or performed during the hospital encounter of 10/10/18   CBC WITH DIFFERENTIAL   Result Value Ref Range    WBC 6.7 4.8 - 10.8 K/uL    RBC 4.56 (L) 4.70 - 6.10 M/uL    Hemoglobin 15.1 14.0 - 18.0 g/dL    Hematocrit 46.7 42.0 - 52.0 %    .4 (H) 81.4 - 97.8 fL    MCH 33.1 (H) 27.0 - 33.0 pg    MCHC 32.3 (L) 33.7 - 35.3 g/dL    RDW 50.4 (H) 35.9 - 50.0 fL    Platelet Count 220 164 - 446 K/uL    MPV 10.0 9.0 - 12.9 fL    Neutrophils-Polys 66.30 44.00 - 72.00 %    Lymphocytes 17.60 (L) 22.00 - 41.00 %    Monocytes 11.80 0.00 - 13.40 %    Eosinophils 2.80 0.00 - 6.90 %    Basophils 1.20 0.00 - 1.80 %    Immature Granulocytes 0.30 0.00 - 0.90 %    Nucleated RBC 0.00 /100 WBC    Neutrophils (Absolute) 4.45 1.82 - 7.42 K/uL    Lymphs (Absolute) 1.18 1.00 - 4.80 K/uL    Monos (Absolute) 0.79 0.00 - 0.85 K/uL    Eos (Absolute) 0.19 0.00 - 0.51 K/uL "    Baso (Absolute) 0.08 0.00 - 0.12 K/uL    Immature Granulocytes (abs) 0.02 0.00 - 0.11 K/uL    NRBC (Absolute) 0.00 K/uL   COMP METABOLIC PANEL   Result Value Ref Range    Sodium 139 135 - 145 mmol/L    Potassium 4.5 3.6 - 5.5 mmol/L    Chloride 106 96 - 112 mmol/L    Co2 25 20 - 33 mmol/L    Anion Gap 8.0 0.0 - 11.9    Glucose 92 65 - 99 mg/dL    Bun 19 8 - 22 mg/dL    Creatinine 1.60 (H) 0.50 - 1.40 mg/dL    Calcium 9.6 8.5 - 10.5 mg/dL    AST(SGOT) 16 12 - 45 U/L    ALT(SGPT) 10 2 - 50 U/L    Alkaline Phosphatase 68 30 - 99 U/L    Total Bilirubin 0.4 0.1 - 1.5 mg/dL    Albumin 4.4 3.2 - 4.9 g/dL    Total Protein 6.7 6.0 - 8.2 g/dL    Globulin 2.3 1.9 - 3.5 g/dL    A-G Ratio 1.9 g/dL   LIPASE   Result Value Ref Range    Lipase 15 11 - 82 U/L   URINALYSIS,CULTURE IF INDICATED   Result Value Ref Range    Color Yellow     Character Clear     Specific Gravity 1.024 <1.035    Ph 5.0 5.0 - 8.0    Glucose Negative Negative mg/dL    Ketones Negative Negative mg/dL    Protein Negative Negative mg/dL    Bilirubin Negative Negative    Urobilinogen, Urine 0.2 Negative    Nitrite Negative Negative    Leukocyte Esterase Moderate (A) Negative    Occult Blood Negative Negative    Micro Urine Req Microscopic    ESTIMATED GFR   Result Value Ref Range    GFR If African American 50 (A) >60 mL/min/1.73 m 2    GFR If Non  41 (A) >60 mL/min/1.73 m 2   URINE MICROSCOPIC (W/UA)   Result Value Ref Range    WBC 2-5 (A) /hpf    RBC 0-2 (A) /hpf    Bacteria Negative None /hpf    Epithelial Cells Negative /hpf    Hyaline Cast 0-2 /lpf     All labs reviewed by me.    RADIOLOGY  CT-RENAL COLIC EVALUATION(A/P W/O)   Final Result         1. No urinary tract calculus identified. No renal collecting system in dilatation. Mildly atrophic bilateral kidneys.      2. Large hiatal hernia.      3. Cholelithiasis.      4. Diverticulosis.         DX-CHEST-PORTABLE (1 VIEW)   Final Result      1.  Hypoinflation without evidence for  pneumonia or pulmonary edema.   2.  Mild cardiomegaly.   3.  Probable hiatal hernia.        The radiologist's interpretation of all radiological studies have been reviewed by me.    COURSE & MEDICAL DECISION MAKING  Pertinent Labs & Imaging studies reviewed. (See chart for details)    6:49 PM - Patient seen and examined at bedside. Patient will be treated with Zofran 4 mg, Sublimaze 50 mcg. Ordered CT renal colic, DX chest, CBC, CMP, lipase, urinalysis culture to evaluate his symptoms. The differential diagnoses include but are not limited to: kidney stone, musculoskeletal pain, intra-abdominal.     9:26 PM- Reviewed the patient's lab and imaging results which were overall unrevealing.     9:35 PM- Patient rechecked at bedside. The patient was updated on diagnostic test results as seen above. The patient will be discharged, status improved, with instructions regarding supportive care and medications. Instructions were given for follow-up. Discussed indications for seeking immediate medical attention. Patient was given the opportunity for questions. The patient understands and agrees.     Decision Making:  Patient with dementia with a left-sided flank pain of uncertain etiology, workup here was unremarkable, discussed with his son uncertain etiology of the patient's abdominal pain, repeat abdominal examination benign will discharge patient home, have the patient return with any other concerns.    HTN/IDDM FOLLOW UP:  The patient is referred to a primary physician for blood pressure management, diabetic screening, and for all other preventive health concerns    The patient will return for new or worsening symptoms and is stable at the time of discharge.    DISPOSITION:  Patient will be discharged home in stable condition.    FOLLOW UP:  [facility], Emergency Dept  [address] [facility] [id number] [phone]    If symptoms worsen    FINAL IMPRESSION  1. Flank pain    2. Generalized  abdominal pain    3. Dementia with behavioral disturbance, unspecified dementia type        I, Nancy Mariee (Scribe), am scribing for, and in the presence of, Oswald Mallory M.D..    Electronically signed by: Nancy Mariee (Scribe), 10/10/2018    IOswald M.D. personally performed the services described in this documentation, as scribed by Nancy Mariee in my presence, and it is both accurate and complete. C.     The note accurately reflects work and decisions made by me.  Oswald Mallory  10/10/2018  11:49 PM

## 2023-03-31 ENCOUNTER — DOCUMENTATION (OUTPATIENT)
Dept: HEALTH INFORMATION MANAGEMENT | Facility: OTHER | Age: 68
End: 2023-03-31
Payer: MEDICARE

## 2023-03-31 ENCOUNTER — PATIENT MESSAGE (OUTPATIENT)
Dept: HEALTH INFORMATION MANAGEMENT | Facility: OTHER | Age: 68
End: 2023-03-31

## 2023-04-03 ENCOUNTER — HOSPITAL ENCOUNTER (OUTPATIENT)
Dept: RADIOLOGY | Facility: MEDICAL CENTER | Age: 68
End: 2023-04-03
Attending: CLINICAL NURSE SPECIALIST
Payer: MEDICARE

## 2023-04-03 DIAGNOSIS — Z12.31 VISIT FOR SCREENING MAMMOGRAM: ICD-10-CM

## 2023-04-03 PROCEDURE — 77063 BREAST TOMOSYNTHESIS BI: CPT

## 2023-04-18 ENCOUNTER — OFFICE VISIT (OUTPATIENT)
Dept: MEDICAL GROUP | Facility: IMAGING CENTER | Age: 68
End: 2023-04-18
Payer: MEDICARE

## 2023-04-18 VITALS
WEIGHT: 256.2 LBS | HEIGHT: 69 IN | TEMPERATURE: 97.8 F | OXYGEN SATURATION: 98 % | HEART RATE: 57 BPM | DIASTOLIC BLOOD PRESSURE: 82 MMHG | SYSTOLIC BLOOD PRESSURE: 136 MMHG | RESPIRATION RATE: 19 BRPM | BODY MASS INDEX: 37.95 KG/M2

## 2023-04-18 DIAGNOSIS — R10.32 LEFT LOWER QUADRANT PAIN: ICD-10-CM

## 2023-04-18 DIAGNOSIS — H57.9 ITCHY EYES: ICD-10-CM

## 2023-04-18 DIAGNOSIS — I89.0 LYMPHEDEMA OF LEFT LOWER EXTREMITY: ICD-10-CM

## 2023-04-18 PROBLEM — R19.7 DIARRHEA: Status: RESOLVED | Noted: 2022-11-07 | Resolved: 2023-04-18

## 2023-04-18 PROCEDURE — 99214 OFFICE O/P EST MOD 30 MIN: CPT | Performed by: CLINICAL NURSE SPECIALIST

## 2023-04-18 RX ORDER — OXYCODONE HYDROCHLORIDE AND ACETAMINOPHEN 5; 325 MG/1; MG/1
1 TABLET ORAL
Qty: 30 TABLET | Refills: 0 | Status: SHIPPED | OUTPATIENT
Start: 2023-04-18 | End: 2023-05-18

## 2023-04-18 ASSESSMENT — FIBROSIS 4 INDEX: FIB4 SCORE: 1.54

## 2023-04-18 ASSESSMENT — PAIN SCALES - GENERAL: PAINLEVEL: NO PAIN

## 2023-04-18 NOTE — PROGRESS NOTES
"Subjective     Dorcas Michael is a 67 y.o. female who presents with Medication Refill (Oxycodone) and LLQ Pain (Pt states she has an achy feeling on the left side. Noticed it in the last 6 months and has gotten consistent.  )            HPI  Lymphedema of left lower extremity  In clinic for a refill of oxycodone acetaminophen.  Taking this as needed and controlling well.  Worse when travels but no travel lately.    Left lower quadrant pain  LLQ pain was intermittent and now more constant x6 months worse with walking around.  No change in bowel movements.  No fevers. No tenderness.  No blood in stool or melena.  No meds tried.  Able to do situps and pushups without pain.  Drinks two cups of coffee in the morning.     ROS  See HPI    No Known Allergies    Current Outpatient Medications on File Prior to Visit   Medication Sig Dispense Refill    celecoxib (CELEBREX) 200 MG Cap TAKE 1 CAPSULE BY MOUTH EVERY DAY 90 Capsule 1    Probiotic Product (PROBIOTIC BLEND PO)       nystatin (MYCOSTATIN) 132339 UNIT/GM Cream topical cream APPLY 1 GM TO AFFECTED AREA(S) 2 TIMES A DAY. 90 g 2    B Complex Vitamins (VITAMIN B COMPLEX PO) every day.      Cholecalciferol (VITAMIN D3) 5000 UNITS Cap Take 1 Capsule by mouth every day.      FIBER PO Take  by mouth every day.      diphenhydrAMINE (BENADRYL) 25 MG Tab Take 1 Tablet by mouth every 6 hours as needed for Sleep.       No current facility-administered medications on file prior to visit.              Objective     /82 (BP Location: Left arm, Patient Position: Sitting, BP Cuff Size: Adult)   Pulse (!) 57   Temp 36.6 °C (97.8 °F) (Temporal)   Resp 19   Ht 1.753 m (5' 9\")   Wt 116 kg (256 lb 3.2 oz)   SpO2 98%   BMI 37.83 kg/m²      Physical Exam  Constitutional:       General: She is not in acute distress.     Appearance: Normal appearance. She is not ill-appearing, toxic-appearing or diaphoretic.   HENT:      Head: Normocephalic and atraumatic.   Eyes:      " General: No scleral icterus.     Extraocular Movements: Extraocular movements intact.      Pupils: Pupils are equal, round, and reactive to light.   Cardiovascular:      Rate and Rhythm: Normal rate.   Pulmonary:      Effort: Pulmonary effort is normal.   Abdominal:      Palpations: Abdomen is soft. There is no mass.      Tenderness: There is abdominal tenderness (very mild in left mid quadrant with deep palpation once). There is no right CVA tenderness, left CVA tenderness or rebound.   Skin:     General: Skin is warm and dry.   Neurological:      Mental Status: She is alert and oriented to person, place, and time.      Gait: Gait normal.   Psychiatric:         Mood and Affect: Mood normal.         Behavior: Behavior normal.         Thought Content: Thought content normal.         Judgment: Judgment normal.                           Assessment & Plan      1. Lymphedema of left lower extremity  Chronic, partially controlled. Pain exacerbations controlled with Percocet 5-325mg as needed.  PDMP reviewed. CS agreement on file.    CONTINUE Percocet 5-325mg as needed daily for pain    - oxyCODONE-acetaminophen (PERCOCET) 5-325 MG Tab; Take 1 Tablet by mouth 1 time a day as needed for Moderate Pain or Severe Pain for up to 30 days.  Dispense: 30 Tablet; Refill: 0    2. Left lower quadrant pain  Chronic, worsening.  Abdominal exam positive for mild discomfort in left mid quadrant  with deep palpation, no rebound and no other location.  No red flag symptoms.  Discussed stopping coffee for 1-2 weeks to see if this issue resolves.     3. Itchy eyes  Resolved    Return if symptoms worsen or fail to improve.

## 2023-04-18 NOTE — ASSESSMENT & PLAN NOTE
In clinic for a refill of oxycodone acetaminophen.  Taking this as needed and controlling well.  Worse when travels but no travel lately.  
LLQ pain was intermittent and now more constant x6 months worse with walking around.  No change in bowel movements.  No fevers. No tenderness.  No blood in stool or melena.  No meds tried.  Able to do situps and pushups without pain.  Drinks two cups of coffee in the morning.   
Resolved with vaseline.  
Respiratory

## 2023-04-20 DIAGNOSIS — I89.0 LYMPHEDEMA OF LEFT LOWER EXTREMITY: ICD-10-CM

## 2023-04-20 NOTE — TELEPHONE ENCOUNTER
Received request via: Pharmacy    Was the patient seen in the last year in this department? Yes    Does the patient have an active prescription (recently filled or refills available) for medication(s) requested? No    Does the patient have [employer] Plus and need 100 day supply (blood pressure, diabetes and cholesterol meds only)? Medication is not for cholesterol, blood pressure or diabetes    Pharmacy comment:   Alternative requested insurance only covers max 7 day supply

## 2023-04-23 RX ORDER — OXYCODONE HYDROCHLORIDE AND ACETAMINOPHEN 5; 325 MG/1; MG/1
1 TABLET ORAL
Qty: 30 TABLET | Refills: 0 | OUTPATIENT
Start: 2023-04-23 | End: 2023-05-23

## 2023-05-22 ENCOUNTER — HOSPITAL ENCOUNTER (OUTPATIENT)
Facility: MEDICAL CENTER | Age: 68
End: 2023-05-22
Attending: CLINICAL NURSE SPECIALIST
Payer: MEDICARE

## 2023-05-22 ENCOUNTER — OFFICE VISIT (OUTPATIENT)
Dept: MEDICAL GROUP | Facility: IMAGING CENTER | Age: 68
End: 2023-05-22
Payer: MEDICARE

## 2023-05-22 VITALS
SYSTOLIC BLOOD PRESSURE: 124 MMHG | OXYGEN SATURATION: 100 % | DIASTOLIC BLOOD PRESSURE: 80 MMHG | HEART RATE: 65 BPM | WEIGHT: 254 LBS | BODY MASS INDEX: 37.62 KG/M2 | TEMPERATURE: 97.8 F | HEIGHT: 69 IN | RESPIRATION RATE: 16 BRPM

## 2023-05-22 DIAGNOSIS — R35.0 FREQUENCY OF URINATION: ICD-10-CM

## 2023-05-22 LAB
APPEARANCE UR: CLEAR
BILIRUB UR STRIP-MCNC: NEGATIVE MG/DL
COLOR UR AUTO: NORMAL
GLUCOSE UR STRIP.AUTO-MCNC: NEGATIVE MG/DL
KETONES UR STRIP.AUTO-MCNC: NEGATIVE MG/DL
LEUKOCYTE ESTERASE UR QL STRIP.AUTO: NEGATIVE
NITRITE UR QL STRIP.AUTO: NEGATIVE
PH UR STRIP.AUTO: 5.5 [PH] (ref 5–8)
PROT UR QL STRIP: NEGATIVE MG/DL
RBC UR QL AUTO: NEGATIVE
SP GR UR STRIP.AUTO: 1.01
UROBILINOGEN UR STRIP-MCNC: 0.2 MG/DL

## 2023-05-22 PROCEDURE — 3079F DIAST BP 80-89 MM HG: CPT | Performed by: CLINICAL NURSE SPECIALIST

## 2023-05-22 PROCEDURE — 81002 URINALYSIS NONAUTO W/O SCOPE: CPT | Performed by: CLINICAL NURSE SPECIALIST

## 2023-05-22 PROCEDURE — 87086 URINE CULTURE/COLONY COUNT: CPT

## 2023-05-22 PROCEDURE — 1126F AMNT PAIN NOTED NONE PRSNT: CPT | Performed by: CLINICAL NURSE SPECIALIST

## 2023-05-22 PROCEDURE — 99213 OFFICE O/P EST LOW 20 MIN: CPT | Performed by: CLINICAL NURSE SPECIALIST

## 2023-05-22 PROCEDURE — 87077 CULTURE AEROBIC IDENTIFY: CPT

## 2023-05-22 PROCEDURE — 3074F SYST BP LT 130 MM HG: CPT | Performed by: CLINICAL NURSE SPECIALIST

## 2023-05-22 ASSESSMENT — FIBROSIS 4 INDEX: FIB4 SCORE: 1.54

## 2023-05-22 ASSESSMENT — PAIN SCALES - GENERAL: PAINLEVEL: NO PAIN

## 2023-05-22 NOTE — PROGRESS NOTES
"Chief Complaint   Patient presents with    Dysuria     With Burning during urination, x4days        HPI:  Symptom onset: 3 days ago   Current symptoms: Painful, urgent, frequent voids.  Had pain in pelvis.  She is constipated. Nausea two days ago.  Mild malodor.  No blood noted in urine.  Took home UTI test and positive.   Since onset symptoms are: Improved  Treatments tried: Hydrating well, cranberry juice.   Associated symptoms: Negative for fever, flank pain, nausea and vomiting, vaginal discharge, hazy urine.  History is positive for frequent UTI.     ROS:  Denies fever, chills, vomiting or abdominal pain.     OBJECTIVE:  /80 (BP Location: Left arm, Patient Position: Sitting, BP Cuff Size: Large adult)   Pulse 65   Temp 36.6 °C (97.8 °F) (Temporal)   Resp 16   Ht 1.753 m (5' 9\")   Wt 115 kg (254 lb)   SpO2 100%   Gen: Alert, NAD.  Abdomen: Soft, tender in suprapubic region. No CVAT. Normal bowel sounds.     Lab Results   Component Value Date    POCCOLOR dark yellow 05/22/2023    POCAPPEAR clear 05/22/2023    POCLEUKEST negative 05/22/2023    POCNITRITE negative 05/22/2023    POCUROBILIGE 0.2 05/22/2023    POCPROTEIN negative 05/22/2023    POCURPH 5.5 05/22/2023    POCBLOOD negative 05/22/2023    POCSPGRV 1.010 05/22/2023    POCKETONES negative 05/22/2023    POCBILIRUBIN negative 05/22/2023    POCGLUCUA negative 05/22/2023          ASSESSMENT/PLAN:     1. Frequency of urination  Acute and improved. She will drink cranberry without sugar and hydrate well.  POCT UA negative in clinic.  Will send out culture. No sign of UTI in clinic.     - POCT Urinalysis-Normal  - Urine Culture; Future    Return if symptoms worsen or fail to improve.    "

## 2023-05-22 NOTE — PATIENT INSTRUCTIONS
Urinary Tract Infection, Adult  A urinary tract infection (UTI) is an infection of any part of the urinary tract. The urinary tract includes:  The kidneys.  The ureters.  The bladder.  The urethra.  These organs make, store, and get rid of pee (urine) in the body.  What are the causes?  This is caused by germs (bacteria) in your genital area. These germs grow and cause swelling (inflammation) of your urinary tract.  What increases the risk?  You are more likely to develop this condition if:  You have a small, thin tube (catheter) to drain pee.  You cannot control when you pee or poop (incontinence).  You are female, and:  You use these methods to prevent pregnancy:  A medicine that kills sperm (spermicide).  A device that blocks sperm (diaphragm).  You have low levels of a female hormone (estrogen).  You are pregnant.  You have genes that add to your risk.  You are sexually active.  You take antibiotic medicines.  You have trouble peeing because of:  A prostate that is bigger than normal, if you are male.  A blockage in the part of your body that drains pee from the bladder (urethra).  A kidney stone.  A nerve condition that affects your bladder (neurogenic bladder).  Not getting enough to drink.  Not peeing often enough.  You have other conditions, such as:  Diabetes.  A weak disease-fighting system (immune system).  Sickle cell disease.  Gout.  Injury of the spine.  What are the signs or symptoms?  Symptoms of this condition include:  Needing to pee right away (urgently).  Peeing often.  Peeing small amounts often.  Pain or burning when peeing.  Blood in the pee.  Pee that smells bad or not like normal.  Trouble peeing.  Pee that is cloudy.  Fluid coming from the vagina, if you are female.  Pain in the belly or lower back.  Other symptoms include:  Throwing up (vomiting).  No urge to eat.  Feeling mixed up (confused).  Being tired and grouchy (irritable).  A fever.  Watery poop (diarrhea).  How is this  treated?  This condition may be treated with:  Antibiotic medicine.  Other medicines.  Drinking enough water.  Follow these instructions at home:    Medicines  Take over-the-counter and prescription medicines only as told by your doctor.  If you were prescribed an antibiotic medicine, take it as told by your doctor. Do not stop taking it even if you start to feel better.  General instructions  Make sure you:  Pee until your bladder is empty.  Do not hold pee for a long time.  Empty your bladder after sex.  Wipe from front to back after pooping if you are a female. Use each tissue one time when you wipe.  Drink enough fluid to keep your pee pale yellow.  Keep all follow-up visits as told by your doctor. This is important.  Contact a doctor if:  You do not get better after 1-2 days.  Your symptoms go away and then come back.  Get help right away if:  You have very bad back pain.  You have very bad pain in your lower belly.  You have a fever.  You are sick to your stomach (nauseous).  You are throwing up.  Summary  A urinary tract infection (UTI) is an infection of any part of the urinary tract.  This condition is caused by germs in your genital area.  There are many risk factors for a UTI. These include having a small, thin tube to drain pee and not being able to control when you pee or poop.  Treatment includes antibiotic medicines for germs.  Drink enough fluid to keep your pee pale yellow.  This information is not intended to replace advice given to you by your health care provider. Make sure you discuss any questions you have with your health care provider.  Document Released: 06/05/2009 Document Revised: 12/05/2019 Document Reviewed: 06/27/2019  [employer] Patient Education © 2020 [employer]

## 2023-05-23 DIAGNOSIS — R35.0 FREQUENCY OF URINATION: ICD-10-CM

## 2023-05-25 LAB
BACTERIA UR CULT: NORMAL
SIGNIFICANT IND 70042: NORMAL
SITE SITE: NORMAL
SOURCE SOURCE: NORMAL

## 2023-06-06 ENCOUNTER — OFFICE VISIT (OUTPATIENT)
Dept: MEDICAL GROUP | Facility: IMAGING CENTER | Age: 68
End: 2023-06-06
Payer: MEDICARE

## 2023-06-06 VITALS
OXYGEN SATURATION: 97 % | HEIGHT: 69 IN | BODY MASS INDEX: 37.89 KG/M2 | TEMPERATURE: 97.3 F | DIASTOLIC BLOOD PRESSURE: 80 MMHG | WEIGHT: 255.8 LBS | HEART RATE: 63 BPM | SYSTOLIC BLOOD PRESSURE: 134 MMHG | RESPIRATION RATE: 16 BRPM

## 2023-06-06 DIAGNOSIS — I89.0 LYMPHEDEMA OF BOTH LOWER EXTREMITIES: ICD-10-CM

## 2023-06-06 PROCEDURE — 3079F DIAST BP 80-89 MM HG: CPT

## 2023-06-06 PROCEDURE — 3075F SYST BP GE 130 - 139MM HG: CPT

## 2023-06-06 PROCEDURE — 99214 OFFICE O/P EST MOD 30 MIN: CPT

## 2023-06-06 RX ORDER — FUROSEMIDE 20 MG/1
20 TABLET ORAL PRN
Qty: 30 TABLET | Refills: 1 | Status: SHIPPED | OUTPATIENT
Start: 2023-06-06 | End: 2023-07-31

## 2023-06-06 ASSESSMENT — FIBROSIS 4 INDEX: FIB4 SCORE: 1.54

## 2023-06-06 NOTE — PROGRESS NOTES
Chief Complaint   Patient presents with    Establish Care     PCP Abe [place]     Both leg and feet  swollen -pt states  maybe her lymphedema x4 days     HISTORY OF THE PRESENT ILLNESS: Patient is a 67 y.o. female. This pleasant patient is here today to establish care and to discuss:    To establish care  Previous PCP Abe Anitra    Lymphedema of left lower extremity  Chronic condition for ~20 years and worsening.  She is reports worsening LLE swelling with pain, and is starting to spread to RLE on Saturday.  It is painful to walk.   She admits to being on her feet for prolonged time since starting her part-time job.   She tries to wear her compression stocking but have not been able to due to swelling.   She takes oxycodone PRN.   She was managed by vein clinic years ago; however, her symptoms worsened after ablation.   She denies fevers, chills, fatigue, malaise.  Denies lower extremity redness, warmth, skin discoloration, numbness, tingling, weakness.        Allergies: Patient has no known allergies.    Current Outpatient Medications Ordered in Epic   Medication Sig Dispense Refill    furosemide (LASIX) 20 MG Tab Take 1 Tablet by mouth as needed (lower leg edema). 30 Tablet 1    celecoxib (CELEBREX) 200 MG Cap TAKE 1 CAPSULE BY MOUTH EVERY DAY 90 Capsule 1    Probiotic Product (PROBIOTIC BLEND PO)       nystatin (MYCOSTATIN) 931839 UNIT/GM Cream topical cream APPLY 1 GM TO AFFECTED AREA(S) 2 TIMES A DAY. 90 g 2    B Complex Vitamins (VITAMIN B COMPLEX PO) every day.      Cholecalciferol (VITAMIN D3) 5000 UNITS Cap Take 1 Capsule by mouth every day.      FIBER PO Take  by mouth every day.      diphenhydrAMINE (BENADRYL) 25 MG Tab Take 1 Tablet by mouth every 6 hours as needed for Sleep.       No current Epic-ordered facility-administered medications on file.       Past Medical History:   Diagnosis Date    Arthritis     both hands    Lymphedema 1995    bilateral legs    Pain     left knee 4/10    Pain     hands,  feet    Pain     left hand       Past Surgical History:   Procedure Laterality Date    VENTRAL HERNIA REPAIR LAPAROSCOPIC  2/16/2021    Procedure: REPAIR, HERNIA, VENTRAL, LAPAROSCOPIC - UMBILICAL;  Surgeon: Lilliana Mejia M.D.;  Location: SURGERY SAME DAY [facility];  Service: General    HERNIA REPAIR  02/16/2021    Umbilical     FINGER ARTHROPLASTY Left 8/22/2017    Procedure: FINGER ARTHROPLASTY - CARPAL METACARPAL;  Surgeon: Magnus Anderson M.D.;  Location: SURGERY [facility];  Service:     TENDON TRANSFER Left 8/22/2017    Procedure: TENDON TRANSFER - FLEXI CARPI RADIALIS;  Surgeon: Magnus Anderson M.D.;  Location: [facility];  Service:     CAPSULOTOMY  8/22/2017    Procedure: CAPSULOTOMY - METACARPAL PHALANGEAL JOINT CAPSULODESIS;  Surgeon: Magnus Anderson M.D.;  Location: [facility];  Service:     FINGER ARTHROPLASTY Right 9/20/2016    Procedure: FINGER ARTHROPLASTY - CARPAL METACARPAL;  Surgeon: Magnus Anderson M.D.;  Location: [facility];  Service:     TENDON TRANSFER Right 9/20/2016    Procedure: TENDON TRANSFER - FLEXI CARPI RADIALIS;  Surgeon: Magnus Anderson M.D.;  Location: [facility];  Service:     PIN INSERTION Right 9/20/2016    Procedure: PIN INSERTION - METACARPAL PHALANGEAL JOINT;  Surgeon: Magnus Anderson M.D.;  Location: [facility];  Service:     KNEE MANIPULATION  3/1/2010    Performed by OSGOOD, PATRICK J at [facility]    KNEE ARTHROPLASTY TOTAL  11/3/2009    Performed by OSGOOD, PATRICK J at [facility]    ACHILLES TENDON REP Right 5/2005    right    KNEE ARTHROPLASTY TOTAL Right 12/2003    right    KNEE ARTHROSCOPY Right 5/1998    right    RECTOCELE REPAIR  6/1990    repair cystocele    KNEE ARTHROTOMY Left 1973    left lateral meniscectomy    KNEE ARTHROTOMY Right 1971    right lateral meniscectomy       Social History     Tobacco Use    Smoking status: Never    Smokeless tobacco: Never  "  Vaping Use    Vaping Use: Never used   Substance Use Topics    Alcohol use: Yes     Alcohol/week: 0.6 - 1.2 oz     Types: 1 - 2 Glasses of wine per week     Comment: 1-2 per day    Drug use: No       Social History     Social History Narrative    Not on file       Family History   Problem Relation Age of Onset    Heart Disease Other     Other Mother         MS    Thyroid Daughter     GI Disease Other         Crohns    Thyroid Sister      ROS:   Gen: no fevers/chills  Pulm: no sob, no cough  CV: no chest pain, no palpitations, + LE, left greater than edema  GI: no nausea/vomiting, no diarrhea  Skin: no rash      Exam: /80 (BP Location: Left arm, Patient Position: Sitting, BP Cuff Size: Adult)   Pulse 63   Temp 36.3 °C (97.3 °F) (Temporal)   Resp 16   Ht 1.753 m (5' 9\")   Wt 116 kg (255 lb 12.8 oz)   SpO2 97%  Body mass index is 37.78 kg/m².    Physical examination done through observation  Constitutional: Alert, no distress, well-groomed.  Skin: Warm, dry, good turgor, no rashes in visible areas.  Eye: Equal, round and reactive, conjunctiva clear, lids normal.  ENMT: Lips without lesions, good dentition, moist mucous membranes.  Neck: Trachea midline, no masses, no thyromegaly.  Respiratory: Unlabored respiratory effort, no cough.  MSK: Normal gait, moves all extremities. LLE 2+ edema, RLE +1 no erythema, warmth, drainage  Neuro: Grossly non-focal.   Psych: Alert and oriented x3, normal affect and mood.      Please note that this dictation was created using voice recognition software. I have made every reasonable attempt to correct obvious errors, but I expect that there are errors of grammar and possibly content that I did not discover before finalizing the note.      Assessment/Plan    67 y.o. female with the following -    1. Lymphedema of both lower extremities  Chronic and uncontrolled condition. No s/s infectious etiology or limb compromise. Will treat with furosemide as needed.  Medication " administration and side effects discussed.  Will send to lymphedema physical therapy for evaluation and management.  Discussed supportive measures that include avoidance of prolonged sitting and standing; leg elevation at least at heart level for 30 minutes 3-4 times per day; calf muscle pump exercise; daily walking and simple ankle flexion exercises; use compression stockings.    Patient given strict instructions to monitor for signs and symptoms of infection and to notify me if this occurs, we will treat with antibiotics as indicated.  ED symptoms discussed.    - REFERRAL TO LYMPhEDEMA-PHYSICAL THERAPY  - furosemide (LASIX) 20 MG Tab; Take 1 Tablet by mouth as needed (lower leg edema).  Dispense: 30 Tablet; Refill: 1    Medical Decision Making/Course:  In the course of preparing for this visit with review of the pertinent past medical history, recent and past clinic visits, current medications, and performing chart, immunization, medical history and medication reconciliation, and in the further course of obtaining the current history pertinent to the clinic visit today, performing an exam and evaluation, ordering and independently evaluating labs, tests, and/or procedures, prescribing any recommended new medications as noted above, providing any pertinent counseling and education and recommending further coordination of care. This was discussed with patient in a shared-decision making conversation, and they understand and agreed with plan of care.     Return if symptoms worsen or fail to improve.    Thank you, Manda NASSAR  [facility]    Please note that this dictation was created using voice recognition software. I have made every reasonable attempt to correct obvious errors, but I expect that there are errors of grammar and possibly content that I did not discover before finalizing the note.

## 2023-06-30 DIAGNOSIS — M18.0 PRIMARY ARTHROSIS OF FIRST CARPOMETACARPAL JOINTS, BILATERAL: ICD-10-CM

## 2023-07-01 RX ORDER — CELECOXIB 200 MG/1
200 CAPSULE ORAL
Qty: 90 CAPSULE | Refills: 0 | Status: SHIPPED | OUTPATIENT
Start: 2023-07-01 | End: 2023-09-20

## 2023-07-07 ENCOUNTER — PHYSICAL THERAPY (OUTPATIENT)
Dept: PHYSICAL THERAPY | Facility: REHABILITATION | Age: 68
End: 2023-07-07
Payer: MEDICARE

## 2023-07-07 DIAGNOSIS — I89.0 LYMPHEDEMA OF BOTH LOWER EXTREMITIES: ICD-10-CM

## 2023-07-07 PROCEDURE — 97535 SELF CARE MNGMENT TRAINING: CPT

## 2023-07-07 PROCEDURE — 97162 PT EVAL MOD COMPLEX 30 MIN: CPT

## 2023-07-07 NOTE — OP THERAPY EVALUATION
Outpatient Physical Therapy  LYMPHEDEMA THERAPY INITIAL EVALUATION    [facility] Physical Therapy [address]  [address]  [place] [address]  Phone:  [phone]  Fax:  [phone]    Date of Evaluation: 07/07/2023    Patient: Dorcas Michael  YOB: 1955  MRN: [id number]     Referring Provider: [place] [address] [address]   Referring Diagnosis Lymphedema of both lower extremities [I89.0]     Time Calculation    Start time: 1448  Stop time: 1534 Time Calculation (min): 46 minutes             Chief Complaint: Lymphedema Congenital    Visit Diagnoses     ICD-10-CM   1. 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 She reports she recently went back to work in March and has been sitting more lately. Has still been wearing her compression stockings but feels that the swelling has worsened. Arrives today to learn self-MLD.       Past Medical History:   Diagnosis Date    Arthritis     both hands    Lymphedema 1995    bilateral legs    Pain     left knee 4/10    Pain     hands, feet    Pain     left hand     Past Surgical History:   Procedure Laterality Date    VENTRAL HERNIA REPAIR LAPAROSCOPIC  2/16/2021    Procedure: REPAIR, HERNIA, VENTRAL, LAPAROSCOPIC - UMBILICAL;  Surgeon: Lilliana Mejia M.D.;  Location: SURGERY SAME DAY [facility];  Service: General    HERNIA REPAIR  02/16/2021    Umbilical     FINGER ARTHROPLASTY Left 8/22/2017    Procedure: FINGER ARTHROPLASTY - CARPAL METACARPAL;  Surgeon: Magnus Anderson M.D.;  Location: SURGERY [facility];  Service:     TENDON TRANSFER Left 8/22/2017    Procedure: TENDON TRANSFER - FLEXI CARPI RADIALIS;  Surgeon: Magnus Anderson M.D.;  Location: SURGERY [facility];  Service:     CAPSULOTOMY  8/22/2017    Procedure: CAPSULOTOMY - METACARPAL  PHALANGEAL JOINT CAPSULODESIS;  Surgeon: Magnus Anderson M.D.;  Location: SURGERY [facility];  Service:     FINGER ARTHROPLASTY Right 9/20/2016    Procedure: FINGER ARTHROPLASTY - CARPAL METACARPAL;  Surgeon: Magnus Anderson M.D.;  Location: SURGERY [facility];  Service:     TENDON TRANSFER Right 9/20/2016    Procedure: TENDON TRANSFER - FLEXI CARPI RADIALIS;  Surgeon: Magnus Anderson M.D.;  Location: SURGERY [facility];  Service:     PIN INSERTION Right 9/20/2016    Procedure: PIN INSERTION - METACARPAL PHALANGEAL JOINT;  Surgeon: Magnus Anderson M.D.;  Location: SURGERY [facility];  Service:     KNEE MANIPULATION  3/1/2010    Performed by OSGOOD, PATRICK J at [facility]    KNEE ARTHROPLASTY TOTAL  11/3/2009    Performed by OSGOOD, PATRICK J at [facility]    ACHILLES TENDON REP Right 5/2005    right    KNEE ARTHROPLASTY TOTAL Right 12/2003    right    KNEE ARTHROSCOPY Right 5/1998    right    RECTOCELE REPAIR  6/1990    repair cystocele    KNEE ARTHROTOMY Left 1973    left lateral meniscectomy    KNEE ARTHROTOMY Right 1971    right lateral meniscectomy     Social History     Tobacco Use    Smoking status: Never    Smokeless tobacco: Never   Vaping Use    Vaping Use: Never used   Substance Use Topics    Alcohol use: Yes     Alcohol/week: 0.6 - 1.2 oz     Types: 1 - 2 Glasses of wine per week     Comment: 1-2 per day     Family and Occupational History     Socioeconomic History    Marital status:      Spouse name: Not on file    Number of children: Not on file    Years of education: Not on file    Highest education level: Associate degree: occupational, technical, or vocational program   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 Self Care ADL Training (CPT 20847)    Therapeutic Treatment and Modalities Summary: Provided education regarding Complete Decongestive Therapy (CDT). CDT is a noninvasive, multi-component approach to treat lymphedema. As there is no cure for lymphedema, the goal of treatment is to return the lymphedema to a stage of latency utilizing healthy lymph vessels and other lymphatic pathways. CDT consists of a combination of manual lymphatic drainage, compression therapy, decongestive exercises, and skin care. This will assist in maintaining the normal or near-normal size of the limb and prevent re-accumulation of lymph fluid. Additional goals of CDT include prevention and elimination of infections and reduction and removal of fibrotic tissues. Handout has been provided with this information.      Patient was educated in regular, self MLD of bilateral LE extremity utilizing axillary and inguinal lymph nodes bilaterally with associated pathways.  Correct strokes were emphasized and handout was provided.  Patient was invited to return for in clinic, skilled physical therapist administered MLD for which patient will set an additional appointment.         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 She was seen last year to obtain compression garment. At that time, CDT was discussed but not pursued. Today, after re-visiting and a more in-depth conversation regarding CDT, Dorcas is agreeable to begin. Patient appears to have primary lymphedema stage 2.  Lymphedema is characterized by high protein edema in the interstitial tissues  causing damage to the lymph transport system and resulting in decreased transport capacity of the lymphatic system.  Patient has tried elevation, self OTC compression garments, diuresis and a low sodium diet, all of which were unsuccessful at treating her swelling.  Skilled lymphedema treatment is unable to be carried out by the patient and unskilled caregivers. Services will be provided by a certified lymphedema therapist.  Complete decongestive therapy is considered the gold standard of medical practice for lymphedema treatment and has proven to be effective for reduction in limb volume size and decrease risk of complications secondary to swelling (such as wounds and infections).    Patient to be seen until decongestion occurs. Physical therapy interventions to include manual lymphatic drainage, compression bandaging, skin care education, wound care if applicable, therapeutic exercises, custom garment fitting and lymphedema education to improve skin integrity, decrease lymphedema swelling, improve joint arthrokinematics and range of motion and improve quality of life.    Spontaneous recovery is not expected without skilled completed decongestive lymphedema therapy.    The patient was informed of evaluation findings and intervention plan and agrees to participate in the plan as outlined.   Prognosis: good      Goals:   Short Term Goals:  1. Pt will achieve a total limb circumference reduction of 10cm in order to decrease risk for infection and progression of disease process.  2. Pt will be independent with self-MLD to facilitate fluid migration to healthy tissues and lymph nodes.   3. Pt will consistently perform exercises with bandages on to facilitate muscle pump of fluid from affected extremity.     Short term goal time span:  2-4 weeks    Long Term Goals:  1. Pt will have a reduction in total limb circumference of 20cm in order to decrease risk for infection and progression of disease process.   2. Patient will  be independent with maintenance phase management of lymphedema including: compression garments, skin care education, risk reduction strategies, manual lymphatic drainage, and therapeutic exercise.   Long term goal time span:  4-6 weeks    Plan:  Therapy options:  Physical therapy treatment to continue  Planned therapy interventions:  Caregiver education, compression bandaging, compression stocking, decongestive exercises, home exercise program, intermittent compression, manual lymph drainage, Velcro wraps, strengthening exercises, soft tissue manual techniques (CPT 82425), skin/wound care, short stretch bandages, sequential compression pump, self-care/training (CPT 86308), myofascial release techniques, orthotic measurements/fitting, patient education, postural exercises, range of motion exercises and referral for compression garment & instructions for don/doffing  Planned education:  Functional anatomy and physiology of the lymphatic system, pathophysiology of lymphedema, lymphedema exercise, lymphedema precautions, proper skin care/nutrition, compression bandaging, self massage, infection prevention, scar tissue management, activity guidelines, dietary guidelines, skin care guidelines, home pump use, bandage removal and long term self-management of lymphedema  Frequency:  2x week  Duration in weeks:  8  Discussed with:  Patient      Functional Assessment Used    LLIS    Referring provider co-signature:  I have reviewed this plan of care and my co-signature certifies the need for services.    Certification Period: 07/07/2023 to  09/01/23    Physician Signature: ________________________________ Date: ______________

## 2023-07-11 ENCOUNTER — PHYSICAL THERAPY (OUTPATIENT)
Dept: PHYSICAL THERAPY | Facility: REHABILITATION | Age: 68
End: 2023-07-11
Payer: MEDICARE

## 2023-07-11 DIAGNOSIS — I89.0 LYMPHEDEMA OF BOTH LOWER EXTREMITIES: ICD-10-CM

## 2023-07-11 PROCEDURE — 97535 SELF CARE MNGMENT TRAINING: CPT

## 2023-07-11 PROCEDURE — 97140 MANUAL THERAPY 1/> REGIONS: CPT

## 2023-07-11 NOTE — OP THERAPY DAILY TREATMENT
Outpatient Physical Therapy  LYMPHEDEMA THERAPY DAILY TREATMENT     [facility] [address].  [address]  [place] [address]  Phone:  [phone]  Fax:  [phone]    Date: 07/11/2023    Patient: Dorcas Michael  YOB: 1955  MRN: [id number]     Time Calculation    Start time: 1500  Stop time: 1549 Time Calculation (min): 49 minutes         Chief Complaint: Lymphedema Congenital    Visit #: 2    Subjective:   History of Present Illness:     Mechanism of injury:  Stubbed her second toe on L foot. Otherwise, no issues.       Lymphedema Objective    Left Lower Extremity Circumferential Measurements  Waist: cm  Hip: cm  Scrotum: cm  Ground/Upper Thigh: cm  Mid Thigh: cm  Knee: 46.2 cm  Upper Calf: 54.2 cm  Mid Calf: 49.2 cm  Ankle: 36.4 cm  Heel to Foot: 24.9 cm  Total: 210.9 cm    Therapeutic Treatments and Modalities:     1. Self Care ADL Training (CPT 36563), see below    2. Manual Therapy (CPT 32937), see below    Therapeutic Treatment and Modalities Summary: Manual:  MLD to L LE without trunk involvement. Focus on L LE and accompanying anastomoses.   The purpose of Manual Lymph Drainage (MLD) is to reduce lymph volume in the affected limb by increasing intake of lymphatic load into the lymphatic system, increasing the volume of transported lymph fluid, moving lymph fluid in superficial lymph vessels to collateral lymph collectors, anastomoses, or tissue channels, and increasing venous return. The goal of MLD is to re-route the lymph flow around blocked areas into more centrally located healthy lymph vessels, which drain into the venous system.      Self Care:   Educated pt on Multi-layer compression bandages application to distal B LE utilizing the technique of 50% overlap and 50% stretch. Gauze has been applied to L toes 1-4 to facilitate reduction in swelling to toes. Stockinette has been applied as a protective base layer with padding overlay to further protect  skin from short-stretch bandages. Short stretch bandages in the size of 6cm, 8cm, 10cm x3 were utilized to complete compression to distal aspects of affected limbs. The high working pressure and low resting pressure qualities of short-stretch bandages make them the preferred compression bandage in the management of lymphedema, especially during the decongestive phase (phase I) of Complete Decongestive Therapy (CDT). Utilization of theses bandages prevents the re-accumulation of evacuated lymph fluid and conserves the results achieved during MLD.     Time-based treatments/modalities:    Physical Therapy Timed Treatment Charges  Functional training, self care minutes (CPT 20686): 19 minutes  Manual therapy minutes (CPT 89378): 30 minutes      Assessment and Plan:   Assessment details:  Today was pt's first day of CDT. Pt understands to unwrap should she experience numbness/large quantity of pain to B LE. Pt has been educated on how to check for capillary refill. Pt also understands importance of maintaining compression and alternatives to bathing. Will benefit from return to clinic to assess previous treatment results and achieve further reduction to B LE.

## 2023-07-12 ENCOUNTER — DOCUMENTATION (OUTPATIENT)
Dept: HEALTH INFORMATION MANAGEMENT | Facility: OTHER | Age: 68
End: 2023-07-12
Payer: MEDICARE

## 2023-07-13 ENCOUNTER — PHYSICAL THERAPY (OUTPATIENT)
Dept: PHYSICAL THERAPY | Facility: REHABILITATION | Age: 68
End: 2023-07-13
Payer: MEDICARE

## 2023-07-13 DIAGNOSIS — I89.0 LYMPHEDEMA OF BOTH LOWER EXTREMITIES: ICD-10-CM

## 2023-07-13 PROCEDURE — 97140 MANUAL THERAPY 1/> REGIONS: CPT

## 2023-07-13 NOTE — OP THERAPY DAILY TREATMENT
Outpatient Physical Therapy  LYMPHEDEMA THERAPY DAILY TREATMENT     [facility] [address]  [address]  [place] [address]  Phone:  [phone]  Fax:  [phone]    Date: 07/13/2023    Patient: Dorcas Michael  YOB: 1955  MRN: [id number]     Time Calculation    Start time: 1501  Stop time: 1545 Time Calculation (min): 44 minutes         Chief Complaint: Lymphedema Aquired    Visit #: 3    Subjective:   History of Present Illness:     Mechanism of injury:  Left bandages on. Just took them off prior to appt.       Lymphedema Objective    Left Lower Extremity Circumferential Measurements  Waist: cm  Hip: cm  Scrotum: cm  Ground/Upper Thigh: cm  Mid Thigh: cm  Knee: 45.2 cm  Upper Calf: 50.7 cm  Mid Calf: 44.1 cm  Ankle: 35.2 cm  Heel to Foot: 25.6 cm  Total: 200.8 cm    Right Lower Extremity Circumferential Measurements  Waist: cm  Hip: cm  Scrotum: cm  Ground/Upper Thigh: cm  Mid Thigh: cm  Knee: 42.5 cm  Upper Calf: 45.4 cm  Mid Calf: 30.5 cm  Ankle: 28.4 cm  Heel to Foot: 22.8 cm  Total: 169.6 cm         Left Lower Extremity Circumferential Measurements EVAL  Waist: cm  Hip: cm  Scrotum: cm  Ground/Upper Thigh: cm  Mid Thigh: cm  Knee: 46.2 cm  Upper Calf: 54.2 cm  Mid Calf: 49.2 cm  Ankle: 36.4 cm  Heel to Foot: 24.9 cm  Total: 210.9 cm    Therapeutic Treatments and Modalities:     1. Manual Therapy (CPT 01772)    Therapeutic Treatment and Modalities Summary: MLD to L LE without trunk involvement. Focus on L LE and accompanying anastomoses.   The purpose of Manual Lymph Drainage (MLD) is to reduce lymph volume in the affected limb by increasing intake of lymphatic load into the lymphatic system, increasing the volume of transported lymph fluid, moving lymph fluid in superficial lymph vessels to collateral lymph collectors, anastomoses, or tissue channels, and increasing venous return. The goal of MLD is to re-route the lymph flow around blocked areas into more  centrally located healthy lymph vessels, which drain into the venous system.      Re-applied multilayer compression.    Time-based treatments/modalities:    Physical Therapy Timed Treatment Charges  Manual therapy minutes (CPT 77089): 44 minutes      Assessment and Plan:   Assessment details:  Dorcas has reduced her L LE by 10cm. R LE has also reduced. She will benefit from further intervention to achieve decongestion.     Plan:  Therapy options:  Physical therapy treatment to continue  Discussed with:  Patient

## 2023-07-18 ENCOUNTER — PHYSICAL THERAPY (OUTPATIENT)
Dept: PHYSICAL THERAPY | Facility: REHABILITATION | Age: 68
End: 2023-07-18
Payer: MEDICARE

## 2023-07-18 DIAGNOSIS — I89.0 LYMPHEDEMA OF BOTH LOWER EXTREMITIES: ICD-10-CM

## 2023-07-18 PROCEDURE — 97140 MANUAL THERAPY 1/> REGIONS: CPT

## 2023-07-18 NOTE — OP THERAPY DAILY TREATMENT
Outpatient Physical Therapy  LYMPHEDEMA THERAPY DAILY TREATMENT     [facility] Physical Therapy [address]  [address]  [place] [address]  Phone:  [phone]  Fax:  [phone]    Date: 07/18/2023    Patient: Dorcas Michael  YOB: 1955  MRN: [id number]     Time Calculation    Start time: 1502  Stop time: 1546 Time Calculation (min): 44 minutes         Chief Complaint: Lymphedema Congenital    Visit #: 7    Subjective:   History of Present Illness:     Mechanism of injury:  Able to re-wrap. REquired assist from spouse. Ankle was smaller but perhaps larger today.       Lymphedema Objective    Left Lower Extremity Circumferential Measurements  Waist: cm  Hip: cm  Scrotum: cm  Ground/Upper Thigh: cm  Mid Thigh: cm  Knee: 46 cm  Upper Calf: 50.1 cm  Mid Calf: 42.3 cm  Ankle: 34.5 cm  Heel to Foot: 25.7 cm  Total: 198.6 cm    Right Lower Extremity Circumferential Measurements  Waist: cm  Hip: cm  Scrotum: cm  Ground/Upper Thigh: cm  Mid Thigh: cm  Knee: 42.7 cm  Upper Calf: 44.7 cm  Mid Calf: 30.7 cm  Ankle: 27.5 cm  Heel to Foot: 23.6 cm  Total: 169.2 cm        Left Lower Extremity Circumferential Measurements 7/13  Waist: cm  Hip: cm  Scrotum: cm  Ground/Upper Thigh: cm  Mid Thigh: cm  Knee: 45.2 cm  Upper Calf: 50.7 cm  Mid Calf: 44.1 cm  Ankle: 35.2 cm  Heel to Foot: 25.6 cm  Total: 200.8 cm     Right Lower Extremity Circumferential Measurements 7/13  Waist: cm  Hip: cm  Scrotum: cm  Ground/Upper Thigh: cm  Mid Thigh: cm  Knee: 42.5 cm  Upper Calf: 45.4 cm  Mid Calf: 30.5 cm  Ankle: 28.4 cm  Heel to Foot: 22.8 cm  Total: 169.6 cm           Left Lower Extremity Circumferential Measurements EVAL  Waist: cm  Hip: cm  Scrotum: cm  Ground/Upper Thigh: cm  Mid Thigh: cm  Knee: 46.2 cm  Upper Calf: 54.2 cm  Mid Calf: 49.2 cm  Ankle: 36.4 cm  Heel to Foot: 24.9 cm  Total: 210.9 cm    Therapeutic Treatments and Modalities:     1. Manual Therapy (CPT 87389)    Therapeutic Treatment and  "Modalities Summary: MLD to L LE without trunk involvement. Focus on L LE and accompanying anastomoses.   The purpose of Manual Lymph Drainage (MLD) is to reduce lymph volume in the affected limb by increasing intake of lymphatic load into the lymphatic system, increasing the volume of transported lymph fluid, moving lymph fluid in superficial lymph vessels to collateral lymph collectors, anastomoses, or tissue channels, and increasing venous return. The goal of MLD is to re-route the lymph flow around blocked areas into more centrally located healthy lymph vessels, which drain into the venous system.      Applied 1/2\" grey foam to pt's lateral and medial ankles as well as dorsal aspect of L foot. Re-applied compression.    Time-based treatments/modalities:    Physical Therapy Timed Treatment Charges  Manual therapy minutes (CPT 80784): 44 minutes      Assessment and Plan:   Assessment details:  Dorcas has sustained her measurements to R LE and decreased her L LE by 3cm. She will benefit from further intervention to achieve decongestion.     Plan:  Therapy options:  Physical therapy treatment to continue  Discussed with:  Patient             "

## 2023-07-19 ENCOUNTER — OFFICE VISIT (OUTPATIENT)
Dept: MEDICAL GROUP | Facility: IMAGING CENTER | Age: 68
End: 2023-07-19
Payer: MEDICARE

## 2023-07-19 ENCOUNTER — HOSPITAL ENCOUNTER (OUTPATIENT)
Facility: MEDICAL CENTER | Age: 68
End: 2023-07-19
Payer: MEDICARE

## 2023-07-19 VITALS
BODY MASS INDEX: 37.95 KG/M2 | WEIGHT: 256.2 LBS | SYSTOLIC BLOOD PRESSURE: 116 MMHG | RESPIRATION RATE: 16 BRPM | HEART RATE: 85 BPM | TEMPERATURE: 96.1 F | HEIGHT: 69 IN | DIASTOLIC BLOOD PRESSURE: 72 MMHG | OXYGEN SATURATION: 95 %

## 2023-07-19 DIAGNOSIS — I89.0 LYMPHEDEMA OF BOTH LOWER EXTREMITIES: ICD-10-CM

## 2023-07-19 DIAGNOSIS — G89.4 CHRONIC PAIN SYNDROME: ICD-10-CM

## 2023-07-19 DIAGNOSIS — Z79.891 CHRONIC USE OF OPIATE FOR THERAPEUTIC PURPOSE: ICD-10-CM

## 2023-07-19 PROCEDURE — 3078F DIAST BP <80 MM HG: CPT

## 2023-07-19 PROCEDURE — 80307 DRUG TEST PRSMV CHEM ANLYZR: CPT

## 2023-07-19 PROCEDURE — 99213 OFFICE O/P EST LOW 20 MIN: CPT

## 2023-07-19 PROCEDURE — 3074F SYST BP LT 130 MM HG: CPT

## 2023-07-19 RX ORDER — OXYCODONE HYDROCHLORIDE AND ACETAMINOPHEN 5; 325 MG/1; MG/1
1 TABLET ORAL
Qty: 30 TABLET | Refills: 0 | Status: SHIPPED | OUTPATIENT
Start: 2023-07-19 | End: 2023-08-18

## 2023-07-19 ASSESSMENT — FIBROSIS 4 INDEX: FIB4 SCORE: 1.54

## 2023-07-19 NOTE — PROGRESS NOTES
Subjective:     CC:   Chief Complaint   Patient presents with    Follow-Up     Medication refill for the Percocet   Signed forms 3 months   Go over the medication Furosemide- no improvement        HPI:   Dorcas presents today to discuss:    Lymphedema of both lower extremity  Chronic condition for ~20 years.    She is seeing PT 2x/week and lymphedema clinic and acupuncture for this.   Patient presents today to request refill on Percocet.  She denies fevers, chills, fatigue, malaise.  Denies lower extremity redness, warmth, skin discoloration, numbness, tingling, weakness.      Past Medical History:   Diagnosis Date    Arthritis     both hands    Lymphedema 1995    bilateral legs    Pain     left knee 4/10    Pain     hands, feet    Pain     left hand     Family History   Problem Relation Age of Onset    Heart Disease Other     Other Mother         MS    Thyroid Daughter     GI Disease Other         Crohns    Thyroid Sister      Past Surgical History:   Procedure Laterality Date    VENTRAL HERNIA REPAIR LAPAROSCOPIC  2/16/2021    Procedure: REPAIR, HERNIA, VENTRAL, LAPAROSCOPIC - UMBILICAL;  Surgeon: Lilliana Mejia M.D.;  Location: SURGERY SAME DAY [facility];  Service: General    HERNIA REPAIR  02/16/2021    Umbilical     FINGER ARTHROPLASTY Left 8/22/2017    Procedure: FINGER ARTHROPLASTY - CARPAL METACARPAL;  Surgeon: Magnus Anderson M.D.;  Location: [facility];  Service:     TENDON TRANSFER Left 8/22/2017    Procedure: TENDON TRANSFER - FLEXI CARPI RADIALIS;  Surgeon: Magnus Anderson M.D.;  Location: [facility];  Service:     CAPSULOTOMY  8/22/2017    Procedure: CAPSULOTOMY - METACARPAL PHALANGEAL JOINT CAPSULODESIS;  Surgeon: Magnus Anderson M.D.;  Location: [facility];  Service:     FINGER ARTHROPLASTY Right 9/20/2016    Procedure: FINGER ARTHROPLASTY - CARPAL METACARPAL;  Surgeon: Magnus Anderson M.D.;  Location: [facility];  Service:     TENDON TRANSFER  Right 9/20/2016    Procedure: TENDON TRANSFER - FLEXI CARPI RADIALIS;  Surgeon: Magnus Anderson M.D.;  Location: SURGERY [facility];  Service:     PIN INSERTION Right 9/20/2016    Procedure: PIN INSERTION - METACARPAL PHALANGEAL JOINT;  Surgeon: Magnus Anderson M.D.;  Location: SURGERY [facility];  Service:     KNEE MANIPULATION  3/1/2010    Performed by OSGOOD, PATRICK J at SURGERY [facility]    KNEE ARTHROPLASTY TOTAL  11/3/2009    Performed by OSGOOD, PATRICK J at SURGERY [facility]    ACHILLES TENDON REP Right 5/2005    right    KNEE ARTHROPLASTY TOTAL Right 12/2003    right    KNEE ARTHROSCOPY Right 5/1998    right    RECTOCELE REPAIR  6/1990    repair cystocele    KNEE ARTHROTOMY Left 1973    left lateral meniscectomy    KNEE ARTHROTOMY Right 1971    right lateral meniscectomy     Social History     Tobacco Use    Smoking status: Never    Smokeless tobacco: Never   Vaping Use    Vaping Use: Never used   Substance Use Topics    Alcohol use: Yes     Alcohol/week: 0.6 - 1.2 oz     Types: 1 - 2 Glasses of wine per week     Comment: 1-2 per day    Drug use: No     Social History     Social History Narrative    Not on file     Current Outpatient Medications Ordered in Epic   Medication Sig Dispense Refill    oxyCODONE-acetaminophen (PERCOCET) 5-325 MG Tab Take 1 Tablet by mouth 1 time a day as needed for Severe Pain for up to 30 days. 30 Tablet 0    celecoxib (CELEBREX) 200 MG Cap Take 1 Capsule by mouth every day. 90 Capsule 0    furosemide (LASIX) 20 MG Tab Take 1 Tablet by mouth as needed (lower leg edema). 30 Tablet 1    Probiotic Product (PROBIOTIC BLEND PO)       nystatin (MYCOSTATIN) 414169 UNIT/GM Cream topical cream APPLY 1 GM TO AFFECTED AREA(S) 2 TIMES A DAY. 90 g 2    B Complex Vitamins (VITAMIN B COMPLEX PO) every day.      Cholecalciferol (VITAMIN D3) 5000 UNITS Cap Take 1 Capsule by mouth every day.      FIBER PO Take  by mouth every day.      diphenhydrAMINE (BENADRYL) 25 MG  "Tab Take 1 Tablet by mouth every 6 hours as needed for Sleep.       No current Epic-ordered facility-administered medications on file.     Patient has no known allergies.    ROS: see hpi  Gen: no fevers/chills  Pulm: no sob, no cough  CV: no chest pain, no palpitations, no edema  GI: no nausea/vomiting, no diarrhea  Skin: no rash    Objective:   Exam:  /72 (BP Location: Left arm, Patient Position: Sitting, BP Cuff Size: Adult)   Pulse 85   Temp (!) 35.6 °C (96.1 °F) (Temporal)   Resp 16   Ht 1.753 m (5' 9\")   Wt 116 kg (256 lb 3.2 oz)   SpO2 95%   BMI 37.83 kg/m²    Body mass index is 37.83 kg/m².    Gen: Alert and oriented, No apparent distress.  HEENT: Head atraumatic, normocephalic. Pupils equal and round.  Neck: Neck is supple without lymphadenopathy.   Lungs: Normal effort, CTA bilaterally, no wheezes, rhonchi, or rales  CV: Regular rate and rhythm. No murmurs, rubs, or gallops.  ABD: +BS. Non-tender, non-distended. No rebound, rigidity, or guarding.  Ext: No clubbing, cyanosis, BLE lymphedema-bilateral bandage in place, CDI    Assessment & Plan:     67 y.o. female with the following -     1. Lymphedema of both lower extremities  2. Chronic pain syndrome  3. Chronic use of opiate for therapeutic purpose  Chronic, unchanged.  Recommend to continue with lymphedema clinic and PT as scheduled.  Continue supportive measures.  Will refill Percocet.  Medication administration and side effects discussed.  Obtained and reviewed patient utilization report from [facility] pharmacy database on 7/19/2023 5:18 PM  prior to writing prescription for controlled substance II, III or IV per [place] bill . Based on assessment of the report, the prescription is medically necessary.   Controlled substance discussed with client. Client agrees to abide by controlled substance contract.    - Controlled Substance Treatment Agreement  - URINE DRUG SCREEN; Future  - oxyCODONE-acetaminophen (PERCOCET) 5-325 MG Tab; Take " 1 Tablet by mouth 1 time a day as needed for Severe Pain for up to 30 days.  Dispense: 30 Tablet; Refill: 0    Medical Decision Making/Course:  In the course of preparing for this visit with review of the pertinent past medical history, recent and past clinic visits, current medications, and performing chart, immunization, medical history and medication reconciliation, and in the further course of obtaining the current history pertinent to the clinic visit today, performing an exam and evaluation, ordering and independently evaluating labs, tests, and/or procedures, prescribing any recommended new medications as noted above, providing any pertinent counseling and education and recommending further coordination of care. This was discussed with patient in a shared-decision making conversation, and they understand and agreed with plan of care.     Return if symptoms worsen or fail to improve.    SHUKRI Sanchez.   [facility]    Please note that this dictation was created using voice recognition software. I have made every reasonable attempt to correct obvious errors, but I expect that there are errors of grammar and possibly content that I did not discover before finalizing the note.

## 2023-07-21 LAB
AMPHETAMINES UR QL: NEGATIVE NG/ML
BARBITURATES UR QL: NEGATIVE NG/ML
BENZODIAZ UR QL: NEGATIVE NG/ML
CANNABINOIDS UR QL SCN: NEGATIVE NG/ML
COCAINE UR QL: NEGATIVE NG/ML
DRUG SCREEN COMMENT UR-IMP: NORMAL
MDMA CTO UR SCN-MCNC: NEGATIVE NG/ML
METHADONE UR QL: NEGATIVE NG/ML
OPIATES UR QL: NEGATIVE NG/ML
OXYCODONE CTO UR SCN-MCNC: NEGATIVE NG/ML
PCP UR QL SCN: NEGATIVE NG/ML
PROPOXYPH UR QL: NEGATIVE NG/ML

## 2023-07-25 ENCOUNTER — PHYSICAL THERAPY (OUTPATIENT)
Dept: PHYSICAL THERAPY | Facility: REHABILITATION | Age: 68
End: 2023-07-25
Payer: MEDICARE

## 2023-07-25 DIAGNOSIS — I89.0 LYMPHEDEMA OF BOTH LOWER EXTREMITIES: ICD-10-CM

## 2023-07-25 PROCEDURE — 97140 MANUAL THERAPY 1/> REGIONS: CPT

## 2023-07-25 NOTE — OP THERAPY DAILY TREATMENT
Outpatient Physical Therapy  LYMPHEDEMA THERAPY DAILY TREATMENT     [facility] Physical Therapy [address]  [address]  [place] [address]  Phone:  [phone]  Fax:  [phone]    Date: 07/25/2023    Patient: Dorcas Michael  YOB: 1955  MRN: [id number]     Time Calculation    Start time: 1501  Stop time: 1545 Time Calculation (min): 44 minutes         Chief Complaint: Lymphedema Congenital    Visit #: 8    Subjective:   History of Present Illness:     Mechanism of injury:  Legs doing ok. Had a really good day where she swam and then wrapped.       Lymphedema Objective    Left Lower Extremity Circumferential Measurements  Waist: cm  Hip: cm  Scrotum: cm  Ground/Upper Thigh: cm  Mid Thigh: cm  Knee: 46.4 cm  Upper Calf: 48 cm  Mid Calf: 38.5 cm  Ankle: 33.8 cm  Heel to Foot: 25.7 cm  Total: 192.4 cm    Right Lower Extremity Circumferential Measurements  Waist: cm  Hip: cm  Scrotum: cm  Ground/Upper Thigh: cm  Mid Thigh: cm  Knee: 42.5 cm  Upper Calf: 44.6 cm  Mid Calf: 29.4 cm  Ankle: 26.3 cm  Heel to Foot: 22.8 cm  Total: 165.6 cm        Left Lower Extremity Circumferential Measurements 7/18  Waist: cm  Hip: cm  Scrotum: cm  Ground/Upper Thigh: cm  Mid Thigh: cm  Knee: 46 cm  Upper Calf: 50.1 cm  Mid Calf: 42.3 cm  Ankle: 34.5 cm  Heel to Foot: 25.7 cm  Total: 198.6 cm     Right Lower Extremity Circumferential Measurements 7/18  Waist: cm  Hip: cm  Scrotum: cm  Ground/Upper Thigh: cm  Mid Thigh: cm  Knee: 42.7 cm  Upper Calf: 44.7 cm  Mid Calf: 30.7 cm  Ankle: 27.5 cm  Heel to Foot: 23.6 cm  Total: 169.2 cm          Left Lower Extremity Circumferential Measurements 7/13  Waist: cm  Hip: cm  Scrotum: cm  Ground/Upper Thigh: cm  Mid Thigh: cm  Knee: 45.2 cm  Upper Calf: 50.7 cm  Mid Calf: 44.1 cm  Ankle: 35.2 cm  Heel to Foot: 25.6 cm  Total: 200.8 cm     Right Lower Extremity Circumferential Measurements 7/13  Waist: cm  Hip: cm  Scrotum: cm  Ground/Upper Thigh: cm  Mid Thigh:  "cm  Knee: 42.5 cm  Upper Calf: 45.4 cm  Mid Calf: 30.5 cm  Ankle: 28.4 cm  Heel to Foot: 22.8 cm  Total: 169.6 cm           Left Lower Extremity Circumferential Measurements EVAL  Waist: cm  Hip: cm  Scrotum: cm  Ground/Upper Thigh: cm  Mid Thigh: cm  Knee: 46.2 cm  Upper Calf: 54.2 cm  Mid Calf: 49.2 cm  Ankle: 36.4 cm  Heel to Foot: 24.9 cm  Total: 210.9 cm       Therapeutic Treatments and Modalities:     1. Manual Therapy (CPT 31001)    Therapeutic Treatment and Modalities Summary: MLD to L LE without trunk involvement. Focus on L LE and accompanying anastomoses.   The purpose of Manual Lymph Drainage (MLD) is to reduce lymph volume in the affected limb by increasing intake of lymphatic load into the lymphatic system, increasing the volume of transported lymph fluid, moving lymph fluid in superficial lymph vessels to collateral lymph collectors, anastomoses, or tissue channels, and increasing venous return. The goal of MLD is to re-route the lymph flow around blocked areas into more centrally located healthy lymph vessels, which drain into the venous system.      Applied 1/2\" grey foam to pt's lateral and medial ankles as well as dorsal aspect of L foot. Re-applied compression.    Time-based treatments/modalities:    Physical Therapy Timed Treatment Charges  Manual therapy minutes (CPT 07718): 44 minutes      Assessment and Plan:   Assessment details:  Dorcas has reduced her L LE by 6cm and her R LE by 4cm. She will benefit from ongoing intervention to achieve decongestion.     Plan:  Therapy options:  Physical therapy treatment to continue             "

## 2023-07-30 DIAGNOSIS — I89.0 LYMPHEDEMA OF BOTH LOWER EXTREMITIES: ICD-10-CM

## 2023-07-31 RX ORDER — FUROSEMIDE 20 MG/1
20 TABLET ORAL PRN
Qty: 30 TABLET | Refills: 1 | Status: SHIPPED | OUTPATIENT
Start: 2023-07-31 | End: 2024-03-13

## 2023-07-31 NOTE — TELEPHONE ENCOUNTER
Received request via: Pharmacy    Was the patient seen in the last year in this department? Yes    Does the patient have an active prescription (recently filled or refills available) for medication(s) requested? No    Does the patient have [facility] and need 100 day supply (blood pressure, diabetes and cholesterol meds only)? Medication is not for cholesterol, blood pressure or diabetes

## 2023-08-01 ENCOUNTER — APPOINTMENT (OUTPATIENT)
Dept: PHYSICAL THERAPY | Facility: REHABILITATION | Age: 68
End: 2023-08-01
Payer: MEDICARE

## 2023-08-08 ENCOUNTER — PHYSICAL THERAPY (OUTPATIENT)
Dept: PHYSICAL THERAPY | Facility: REHABILITATION | Age: 68
End: 2023-08-08
Payer: MEDICARE

## 2023-08-08 DIAGNOSIS — I89.0 LYMPHEDEMA OF BOTH LOWER EXTREMITIES: ICD-10-CM

## 2023-08-08 PROCEDURE — 97140 MANUAL THERAPY 1/> REGIONS: CPT

## 2023-08-08 NOTE — OP THERAPY DAILY TREATMENT
Outpatient Physical Therapy  LYMPHEDEMA THERAPY DAILY TREATMENT     [facility] [address].  [address]  [place] [address]  Phone:  [phone]  Fax:  [phone]    Date: 08/08/2023    Patient: Dorcas Michael  YOB: 1955  MRN: [id number]     Time Calculation    Start time: 1501  Stop time: 1545 Time Calculation (min): 44 minutes         Chief Complaint: Lymphedema Congenital    Visit #: 9    Subjective:   History of Present Illness:     Mechanism of injury:  Able to consistently wrap legs while camping. Had trouble with 5th toe on L.       Lymphedema Objective    Left Lower Extremity Circumferential Measurements  Waist: cm  Hip: cm  Scrotum: cm  Ground/Upper Thigh: cm  Mid Thigh: cm  Knee: 46.6 cm  Upper Calf: 48.7 cm  Mid Calf: 43.1 cm  Ankle: 34.7 cm  Heel to Foot: 25.2 cm  Total: 198.3 cm    Right Lower Extremity Circumferential Measurements  Waist: cm  Hip: cm  Scrotum: cm  Ground/Upper Thigh: cm  Mid Thigh: cm  Knee: 43.3 cm  Upper Calf: 45.5 cm  Mid Calf: 31.4 cm  Ankle: 27.6 cm  Heel to Foot: 22.5 cm  Total: 170.3 cm        Left Lower Extremity Circumferential Measurements 7/25  Waist: cm  Hip: cm  Scrotum: cm  Ground/Upper Thigh: cm  Mid Thigh: cm  Knee: 46.4 cm  Upper Calf: 48 cm  Mid Calf: 38.5 cm  Ankle: 33.8 cm  Heel to Foot: 25.7 cm  Total: 192.4 cm     Right Lower Extremity Circumferential Measurements 7/25  Waist: cm  Hip: cm  Scrotum: cm  Ground/Upper Thigh: cm  Mid Thigh: cm  Knee: 42.5 cm  Upper Calf: 44.6 cm  Mid Calf: 29.4 cm  Ankle: 26.3 cm  Heel to Foot: 22.8 cm  Total: 165.6 cm          Left Lower Extremity Circumferential Measurements 7/18  Waist: cm  Hip: cm  Scrotum: cm  Ground/Upper Thigh: cm  Mid Thigh: cm  Knee: 46 cm  Upper Calf: 50.1 cm  Mid Calf: 42.3 cm  Ankle: 34.5 cm  Heel to Foot: 25.7 cm  Total: 198.6 cm     Right Lower Extremity Circumferential Measurements 7/18  Waist: cm  Hip: cm  Scrotum: cm  Ground/Upper Thigh: cm  Mid  Thigh: cm  Knee: 42.7 cm  Upper Calf: 44.7 cm  Mid Calf: 30.7 cm  Ankle: 27.5 cm  Heel to Foot: 23.6 cm  Total: 169.2 cm          Left Lower Extremity Circumferential Measurements 7/13  Waist: cm  Hip: cm  Scrotum: cm  Ground/Upper Thigh: cm  Mid Thigh: cm  Knee: 45.2 cm  Upper Calf: 50.7 cm  Mid Calf: 44.1 cm  Ankle: 35.2 cm  Heel to Foot: 25.6 cm  Total: 200.8 cm     Right Lower Extremity Circumferential Measurements 7/13  Waist: cm  Hip: cm  Scrotum: cm  Ground/Upper Thigh: cm  Mid Thigh: cm  Knee: 42.5 cm  Upper Calf: 45.4 cm  Mid Calf: 30.5 cm  Ankle: 28.4 cm  Heel to Foot: 22.8 cm  Total: 169.6 cm           Left Lower Extremity Circumferential Measurements EVAL  Waist: cm  Hip: cm  Scrotum: cm  Ground/Upper Thigh: cm  Mid Thigh: cm  Knee: 46.2 cm  Upper Calf: 54.2 cm  Mid Calf: 49.2 cm  Ankle: 36.4 cm  Heel to Foot: 24.9 cm  Total: 210.9 cm       Therapeutic Treatments and Modalities:     1. Manual Therapy (CPT 98246)    Therapeutic Treatment and Modalities Summary: MLD to L LE without trunk involvement. Focus on L LE and accompanying anastomoses.   The purpose of Manual Lymph Drainage (MLD) is to reduce lymph volume in the affected limb by increasing intake of lymphatic load into the lymphatic system, increasing the volume of transported lymph fluid, moving lymph fluid in superficial lymph vessels to collateral lymph collectors, anastomoses, or tissue channels, and increasing venous return. The goal of MLD is to re-route the lymph flow around blocked areas into more centrally located healthy lymph vessels, which drain into the venous system.       Re-applied compression with instruction on limiting 5th toe restricton.    Time-based treatments/modalities:    Physical Therapy Timed Treatment Charges  Manual therapy minutes (CPT 86224): 44 minutes      Assessment and Plan:   Assessment details:  Dorcas has had a slight increase in her measurements, however, she is still achieving decongestion. Now that she has  returned from a brief trip out of town, she can benefit from ongoing intervention to reduce her legs.     Plan:  Therapy options:  Physical therapy treatment to continue  Discussed with:  Patient

## 2023-08-08 NOTE — OP THERAPY PROGRESS SUMMARY
Outpatient Physical Therapy  PROGRESS SUMMARY NOTE      [facility] Physical Therapy [address]  [address]  [place] [address]  Phone:  [phone]  Fax:  [phone]    Date of Visit: 08/08/2023    Patient: Dorcas Michael  YOB: 1955  MRN: [id number]     Referring Provider: [address] [address]   Referring Diagnosis Lymphedema, not elsewhere classified [I89.0]     Visit Diagnoses     ICD-10-CM   1. Lymphedema of both lower extremities  I89.0       Rehab Potential: good    Progress Report Period: 7/7/23-8/8/23            Objective Findings and Assessment:   Patient progression towards goals: Dorcas has been seen for 9visits of CDT and has reduced her L LE by 12cm (16 at her best) and her R LE by 6 at her best. She will benefit from further intervention to further decongest her L LE to allow for transition to a flat knit garment that will contain her smaller leg size.     Objective findings and assessment details: Left Lower Extremity Circumferential Measurements 8/8  Knee: 46.6 cm  Upper Calf: 48.7 cm  Mid Calf: 43.1 cm  Ankle: 34.7 cm  Heel to Foot: 25.2 cm  Total: 198.3 cm    Right Lower Extremity Circumferential Measurements 8/8  Knee: 43.3 cm  Upper Calf: 45.5 cm  Mid Calf: 31.4 cm  Ankle: 27.6 cm  Heel to Foot: 22.5 cm  Total: 170.3 cm      Left Lower Extremity Circumferential Measurements EVAL  Knee: 46.2 cm  Upper Calf: 54.2 cm  Mid Calf: 49.2 cm  Ankle: 36.4 cm  Heel to Foot: 24.9 cm  Total: 210.9 cm    Goals:   Short Term Goals:    1. Pt will achieve a total limb circumference reduction of 10cm in order to decrease risk for infection and progression of disease process.Goal Met  2. Pt will be independent with self-MLD to facilitate fluid migration to healthy tissues and lymph nodes. Goal Met  3. Pt will consistently perform exercises with bandages on to facilitate muscle pump of fluid from affected extremity. GOal Met    4.  New goal: Pt will achieve a total limb circumference reduction of an additional 10cm in order to decrease risk for infection and progression of disease process.  Short term goal time span:  2-4 weeks      Long Term Goals:      1. Pt will have a reduction in total limb circumference of 20cm in order to decrease risk for infection and progression of disease process. Goal Not Met  2. Patient will be independent with maintenance phase management of lymphedema including: compression garments, skin care education, risk reduction strategies, manual lymphatic drainage, and therapeutic exercise. Goal Partially Met  Long term goal time span:  4-6 weeks    Plan:   Planned therapy interventions:  Functional Training, Self Care (CPT 24886), Manual Therapy (CPT 27102), Therapeutic Activities (CPT 75101) and Therapeutic Exercise (CPT 86244)  Frequency:  2x week  Duration in weeks:  8      Referring provider co-signature:  I have reviewed this plan of care and my co-signature certifies the need for services.     Certification Period: 08/08/2023 to 10/03/23    Physician Signature: ________________________________ Date: ______________

## 2023-08-10 ENCOUNTER — PHYSICAL THERAPY (OUTPATIENT)
Dept: PHYSICAL THERAPY | Facility: REHABILITATION | Age: 68
End: 2023-08-10
Payer: MEDICARE

## 2023-08-10 DIAGNOSIS — I89.0 LYMPHEDEMA OF BOTH LOWER EXTREMITIES: ICD-10-CM

## 2023-08-10 PROCEDURE — 97140 MANUAL THERAPY 1/> REGIONS: CPT

## 2023-08-10 NOTE — OP THERAPY DAILY TREATMENT
Outpatient Physical Therapy  LYMPHEDEMA THERAPY DAILY TREATMENT     [facility] [address].  Suite [facility]  [place] [address]  Phone:  [phone]  Fax:  [phone]    Date: 08/10/2023    Patient: Dorcas Michael  YOB: 1955  MRN: [id number]     Time Calculation    Start time: 1500  Stop time: 1546 Time Calculation (min): 46 minutes         Chief Complaint: Lymphedema Congenital    Visit #: 7    Subjective:   History of Present Illness:     Mechanism of injury:  R ankle swelled a little. Will need compression to the foot.       Lymphedema Objective    Left Lower Extremity Circumferential Measurements  Waist: cm  Hip: cm  Scrotum: cm  Ground/Upper Thigh: cm  Mid Thigh: cm  Knee: 46.1 cm  Upper Calf: 48.2 cm  Mid Calf: 38.1 cm  Ankle: 34.3 cm  Heel to Foot: 26 cm  Total: 192.7 cm    Right Lower Extremity Circumferential Measurements  Waist: cm  Hip: cm  Scrotum: cm  Ground/Upper Thigh: cm  Mid Thigh: cm  Knee: 42.2 cm  Upper Calf: 44.2 cm  Mid Calf: 28.3 cm  Ankle: 25.4 cm  Heel to Foot: 22.8 cm  Total: 162.9 cm        Left Lower Extremity Circumferential Measurements 8/8  Waist: cm  Hip: cm  Scrotum: cm  Ground/Upper Thigh: cm  Mid Thigh: cm  Knee: 46.6 cm  Upper Calf: 48.7 cm  Mid Calf: 43.1 cm  Ankle: 34.7 cm  Heel to Foot: 25.2 cm  Total: 198.3 cm     Right Lower Extremity Circumferential Measurements 8/8  Waist: cm  Hip: cm  Scrotum: cm  Ground/Upper Thigh: cm  Mid Thigh: cm  Knee: 43.3 cm  Upper Calf: 45.5 cm  Mid Calf: 31.4 cm  Ankle: 27.6 cm  Heel to Foot: 22.5 cm  Total: 170.3 cm          Left Lower Extremity Circumferential Measurements 7/25  Waist: cm  Hip: cm  Scrotum: cm  Ground/Upper Thigh: cm  Mid Thigh: cm  Knee: 46.4 cm  Upper Calf: 48 cm  Mid Calf: 38.5 cm  Ankle: 33.8 cm  Heel to Foot: 25.7 cm  Total: 192.4 cm     Right Lower Extremity Circumferential Measurements 7/25  Waist: cm  Hip: cm  Scrotum: cm  Ground/Upper Thigh: cm  Mid Thigh: cm  Knee:  42.5 cm  Upper Calf: 44.6 cm  Mid Calf: 29.4 cm  Ankle: 26.3 cm  Heel to Foot: 22.8 cm  Total: 165.6 cm          Left Lower Extremity Circumferential Measurements 7/18  Waist: cm  Hip: cm  Scrotum: cm  Ground/Upper Thigh: cm  Mid Thigh: cm  Knee: 46 cm  Upper Calf: 50.1 cm  Mid Calf: 42.3 cm  Ankle: 34.5 cm  Heel to Foot: 25.7 cm  Total: 198.6 cm     Right Lower Extremity Circumferential Measurements 7/18  Waist: cm  Hip: cm  Scrotum: cm  Ground/Upper Thigh: cm  Mid Thigh: cm  Knee: 42.7 cm  Upper Calf: 44.7 cm  Mid Calf: 30.7 cm  Ankle: 27.5 cm  Heel to Foot: 23.6 cm  Total: 169.2 cm          Left Lower Extremity Circumferential Measurements 7/13  Waist: cm  Hip: cm  Scrotum: cm  Ground/Upper Thigh: cm  Mid Thigh: cm  Knee: 45.2 cm  Upper Calf: 50.7 cm  Mid Calf: 44.1 cm  Ankle: 35.2 cm  Heel to Foot: 25.6 cm  Total: 200.8 cm     Right Lower Extremity Circumferential Measurements 7/13  Waist: cm  Hip: cm  Scrotum: cm  Ground/Upper Thigh: cm  Mid Thigh: cm  Knee: 42.5 cm  Upper Calf: 45.4 cm  Mid Calf: 30.5 cm  Ankle: 28.4 cm  Heel to Foot: 22.8 cm  Total: 169.6 cm           Left Lower Extremity Circumferential Measurements EVAL  Waist: cm  Hip: cm  Scrotum: cm  Ground/Upper Thigh: cm  Mid Thigh: cm  Knee: 46.2 cm  Upper Calf: 54.2 cm  Mid Calf: 49.2 cm  Ankle: 36.4 cm  Heel to Foot: 24.9 cm  Total: 210.9 cm    Therapeutic Treatments and Modalities:     1. Manual Therapy (CPT 03797)    Therapeutic Treatment and Modalities Summary: MLD to L LE without trunk involvement. Focus on L LE and accompanying anastomoses.   The purpose of Manual Lymph Drainage (MLD) is to reduce lymph volume in the affected limb by increasing intake of lymphatic load into the lymphatic system, increasing the volume of transported lymph fluid, moving lymph fluid in superficial lymph vessels to collateral lymph collectors, anastomoses, or tissue channels, and increasing venous return. The goal of MLD is to re-route the lymph flow around blocked  areas into more centrally located healthy lymph vessels, which drain into the venous system.      Re-applied compression.     Time-based treatments/modalities:    Physical Therapy Timed Treatment Charges  Manual therapy minutes (CPT 57725): 46 minutes      Assessment and Plan:   Assessment details:  Dorcas reduced her B LE by 6cm on her L and 8cm on her R. L distal LE is softening and will likely decongest further. R LE may be reaching a plateau, but will re-assess.     Plan:  Therapy options:  Physical therapy treatment to continue  Discussed with:  Patient

## 2023-08-18 ENCOUNTER — PHYSICAL THERAPY (OUTPATIENT)
Dept: PHYSICAL THERAPY | Facility: REHABILITATION | Age: 68
End: 2023-08-18
Payer: MEDICARE

## 2023-08-18 DIAGNOSIS — I89.0 LYMPHEDEMA OF BOTH LOWER EXTREMITIES: ICD-10-CM

## 2023-08-18 PROCEDURE — 97140 MANUAL THERAPY 1/> REGIONS: CPT

## 2023-08-18 NOTE — OP THERAPY DAILY TREATMENT
Outpatient Physical Therapy  LYMPHEDEMA THERAPY DAILY TREATMENT     [facility] Therapy [address].  Suite [facility]  [place] [address]  Phone:  [phone]  Fax:  [phone]    Date: 08/18/2023    Patient: Dorcas Michael  YOB: 1955  MRN: [id number]     Time Calculation    Start time: 1517  Stop time: 1559 Time Calculation (min): 42 minutes         Chief Complaint: Lymphedema Congenital    Visit #: 8    Subjective:   History of Present Illness:     Mechanism of injury:  Legs looked good over weekend. Able to wear some compression socks and sustain shape.       Lymphedema Objective    Left Lower Extremity Circumferential Measurements  Waist: cm  Hip: cm  Scrotum: cm  Ground/Upper Thigh: cm  Mid Thigh: cm  Knee: 46.8 cm  Upper Calf: 47.3 cm  Mid Calf: 40.5 cm  Ankle: 31.2 cm  Heel to Foot: 25.6 cm  Total: 191.4 cm    Right Lower Extremity Circumferential Measurements  Waist: cm  Hip: cm  Scrotum: cm  Ground/Upper Thigh: cm  Mid Thigh: cm  Knee: 43.6 cm  Upper Calf: 45.2 cm  Mid Calf: 30.3 cm  Ankle: 27.7 cm  Heel to Foot: 23.5 cm  Total: 170.3 cm        Left Lower Extremity Circumferential Measurements 8/10  Waist: cm  Hip: cm  Scrotum: cm  Ground/Upper Thigh: cm  Mid Thigh: cm  Knee: 46.1 cm  Upper Calf: 48.2 cm  Mid Calf: 38.1 cm  Ankle: 34.3 cm  Heel to Foot: 26 cm  Total: 192.7 cm     Right Lower Extremity Circumferential Measurements 8/10  Waist: cm  Hip: cm  Scrotum: cm  Ground/Upper Thigh: cm  Mid Thigh: cm  Knee: 42.2 cm  Upper Calf: 44.2 cm  Mid Calf: 28.3 cm  Ankle: 25.4 cm  Heel to Foot: 22.8 cm  Total: 162.9 cm          Left Lower Extremity Circumferential Measurements 8/8  Waist: cm  Hip: cm  Scrotum: cm  Ground/Upper Thigh: cm  Mid Thigh: cm  Knee: 46.6 cm  Upper Calf: 48.7 cm  Mid Calf: 43.1 cm  Ankle: 34.7 cm  Heel to Foot: 25.2 cm  Total: 198.3 cm     Right Lower Extremity Circumferential Measurements 8/8  Waist: cm  Hip: cm  Scrotum: cm  Ground/Upper Thigh:  cm  Mid Thigh: cm  Knee: 43.3 cm  Upper Calf: 45.5 cm  Mid Calf: 31.4 cm  Ankle: 27.6 cm  Heel to Foot: 22.5 cm  Total: 170.3 cm          Left Lower Extremity Circumferential Measurements 7/25  Waist: cm  Hip: cm  Scrotum: cm  Ground/Upper Thigh: cm  Mid Thigh: cm  Knee: 46.4 cm  Upper Calf: 48 cm  Mid Calf: 38.5 cm  Ankle: 33.8 cm  Heel to Foot: 25.7 cm  Total: 192.4 cm     Right Lower Extremity Circumferential Measurements 7/25  Waist: cm  Hip: cm  Scrotum: cm  Ground/Upper Thigh: cm  Mid Thigh: cm  Knee: 42.5 cm  Upper Calf: 44.6 cm  Mid Calf: 29.4 cm  Ankle: 26.3 cm  Heel to Foot: 22.8 cm  Total: 165.6 cm          Left Lower Extremity Circumferential Measurements 7/18  Waist: cm  Hip: cm  Scrotum: cm  Ground/Upper Thigh: cm  Mid Thigh: cm  Knee: 46 cm  Upper Calf: 50.1 cm  Mid Calf: 42.3 cm  Ankle: 34.5 cm  Heel to Foot: 25.7 cm  Total: 198.6 cm     Right Lower Extremity Circumferential Measurements 7/18  Waist: cm  Hip: cm  Scrotum: cm  Ground/Upper Thigh: cm  Mid Thigh: cm  Knee: 42.7 cm  Upper Calf: 44.7 cm  Mid Calf: 30.7 cm  Ankle: 27.5 cm  Heel to Foot: 23.6 cm  Total: 169.2 cm          Left Lower Extremity Circumferential Measurements 7/13  Waist: cm  Hip: cm  Scrotum: cm  Ground/Upper Thigh: cm  Mid Thigh: cm  Knee: 45.2 cm  Upper Calf: 50.7 cm  Mid Calf: 44.1 cm  Ankle: 35.2 cm  Heel to Foot: 25.6 cm  Total: 200.8 cm     Right Lower Extremity Circumferential Measurements 7/13  Waist: cm  Hip: cm  Scrotum: cm  Ground/Upper Thigh: cm  Mid Thigh: cm  Knee: 42.5 cm  Upper Calf: 45.4 cm  Mid Calf: 30.5 cm  Ankle: 28.4 cm  Heel to Foot: 22.8 cm  Total: 169.6 cm           Left Lower Extremity Circumferential Measurements EVAL  Waist: cm  Hip: cm  Scrotum: cm  Ground/Upper Thigh: cm  Mid Thigh: cm  Knee: 46.2 cm  Upper Calf: 54.2 cm  Mid Calf: 49.2 cm  Ankle: 36.4 cm  Heel to Foot: 24.9 cm  Total: 210.9 cm    Therapeutic Treatments and Modalities:     1. Manual Therapy (CPT 41897)    Therapeutic Treatment and  Modalities Summary: MLD to L LE without trunk involvement. Focus on L LE and accompanying anastomoses.   The purpose of Manual Lymph Drainage (MLD) is to reduce lymph volume in the affected limb by increasing intake of lymphatic load into the lymphatic system, increasing the volume of transported lymph fluid, moving lymph fluid in superficial lymph vessels to collateral lymph collectors, anastomoses, or tissue channels, and increasing venous return. The goal of MLD is to re-route the lymph flow around blocked areas into more centrally located healthy lymph vessels, which drain into the venous system.      Re-applied compression.     Time-based treatments/modalities:    Physical Therapy Timed Treatment Charges  Manual therapy minutes (CPT 69951): 42 minutes      Assessment and Plan:   Assessment details:  Dorcas reduced her L LE by an additional 1.5cm. R LE is fluctuating, but likely reached plateau possibly last session. She will benefit from further intervention to decongest L LE.     Plan:  Therapy options:  Physical therapy treatment to continue  Discussed with:  Patient

## 2023-08-21 ENCOUNTER — PHYSICAL THERAPY (OUTPATIENT)
Dept: PHYSICAL THERAPY | Facility: REHABILITATION | Age: 68
End: 2023-08-21
Payer: MEDICARE

## 2023-08-21 DIAGNOSIS — I89.0 LYMPHEDEMA OF BOTH LOWER EXTREMITIES: ICD-10-CM

## 2023-08-21 PROCEDURE — 97140 MANUAL THERAPY 1/> REGIONS: CPT

## 2023-08-21 NOTE — OP THERAPY DAILY TREATMENT
Outpatient Physical Therapy  LYMPHEDEMA THERAPY DAILY TREATMENT     [facility] Physical Therapy [address].  Suite [facility]  [place] [address]  Phone:  [phone]  Fax:  [phone]    Date: 08/21/2023    Patient: Dorcas Michael  YOB: 1955  MRN: [id number]     Time Calculation    Start time: 1501  Stop time: 1544 Time Calculation (min): 43 minutes         Chief Complaint: Lymphedema Congenital    Visit #: 9    Subjective:   History of Present Illness:     Mechanism of injury:  Legs doing ok. Able to re-wrap over the weekend.       Lymphedema Objective    Left Lower Extremity Circumferential Measurements  Waist: cm  Hip: cm  Scrotum: cm  Ground/Upper Thigh: cm  Mid Thigh: cm  Knee: 44.1 cm  Upper Calf: 47.2 cm  Mid Calf: 40.2 cm  Ankle: 32.1 cm  Heel to Foot: 25 cm  Total: 188.6 cm    Right Lower Extremity Circumferential Measurements  Waist: cm  Hip: cm  Scrotum: cm  Ground/Upper Thigh: cm  Mid Thigh: cm  Knee: 41.1 cm  Upper Calf: 43.8 cm  Mid Calf: 29.8 cm  Ankle: 26.2 cm  Heel to Foot: 22.5 cm  Total: 163.4 cm           Left Lower Extremity Circumferential Measurements 8/18  Waist: cm  Hip: cm  Scrotum: cm  Ground/Upper Thigh: cm  Mid Thigh: cm  Knee: 46.8 cm  Upper Calf: 47.3 cm  Mid Calf: 40.5 cm  Ankle: 31.2 cm  Heel to Foot: 25.6 cm  Total: 191.4 cm     Right Lower Extremity Circumferential Measurements 8/18  Waist: cm  Hip: cm  Scrotum: cm  Ground/Upper Thigh: cm  Mid Thigh: cm  Knee: 43.6 cm  Upper Calf: 45.2 cm  Mid Calf: 30.3 cm  Ankle: 27.7 cm  Heel to Foot: 23.5 cm  Total: 170.3 cm          Left Lower Extremity Circumferential Measurements 8/10  Waist: cm  Hip: cm  Scrotum: cm  Ground/Upper Thigh: cm  Mid Thigh: cm  Knee: 46.1 cm  Upper Calf: 48.2 cm  Mid Calf: 38.1 cm  Ankle: 34.3 cm  Heel to Foot: 26 cm  Total: 192.7 cm     Right Lower Extremity Circumferential Measurements 8/10  Waist: cm  Hip: cm  Scrotum: cm  Ground/Upper Thigh: cm  Mid Thigh: cm  Knee: 42.2  cm  Upper Calf: 44.2 cm  Mid Calf: 28.3 cm  Ankle: 25.4 cm  Heel to Foot: 22.8 cm  Total: 162.9 cm          Left Lower Extremity Circumferential Measurements 8/8  Waist: cm  Hip: cm  Scrotum: cm  Ground/Upper Thigh: cm  Mid Thigh: cm  Knee: 46.6 cm  Upper Calf: 48.7 cm  Mid Calf: 43.1 cm  Ankle: 34.7 cm  Heel to Foot: 25.2 cm  Total: 198.3 cm     Right Lower Extremity Circumferential Measurements 8/8  Waist: cm  Hip: cm  Scrotum: cm  Ground/Upper Thigh: cm  Mid Thigh: cm  Knee: 43.3 cm  Upper Calf: 45.5 cm  Mid Calf: 31.4 cm  Ankle: 27.6 cm  Heel to Foot: 22.5 cm  Total: 170.3 cm          Left Lower Extremity Circumferential Measurements 7/25  Waist: cm  Hip: cm  Scrotum: cm  Ground/Upper Thigh: cm  Mid Thigh: cm  Knee: 46.4 cm  Upper Calf: 48 cm  Mid Calf: 38.5 cm  Ankle: 33.8 cm  Heel to Foot: 25.7 cm  Total: 192.4 cm     Right Lower Extremity Circumferential Measurements 7/25  Waist: cm  Hip: cm  Scrotum: cm  Ground/Upper Thigh: cm  Mid Thigh: cm  Knee: 42.5 cm  Upper Calf: 44.6 cm  Mid Calf: 29.4 cm  Ankle: 26.3 cm  Heel to Foot: 22.8 cm  Total: 165.6 cm          Left Lower Extremity Circumferential Measurements 7/18  Waist: cm  Hip: cm  Scrotum: cm  Ground/Upper Thigh: cm  Mid Thigh: cm  Knee: 46 cm  Upper Calf: 50.1 cm  Mid Calf: 42.3 cm  Ankle: 34.5 cm  Heel to Foot: 25.7 cm  Total: 198.6 cm     Right Lower Extremity Circumferential Measurements 7/18  Waist: cm  Hip: cm  Scrotum: cm  Ground/Upper Thigh: cm  Mid Thigh: cm  Knee: 42.7 cm  Upper Calf: 44.7 cm  Mid Calf: 30.7 cm  Ankle: 27.5 cm  Heel to Foot: 23.6 cm  Total: 169.2 cm          Left Lower Extremity Circumferential Measurements 7/13  Waist: cm  Hip: cm  Scrotum: cm  Ground/Upper Thigh: cm  Mid Thigh: cm  Knee: 45.2 cm  Upper Calf: 50.7 cm  Mid Calf: 44.1 cm  Ankle: 35.2 cm  Heel to Foot: 25.6 cm  Total: 200.8 cm     Right Lower Extremity Circumferential Measurements 7/13  Waist: cm  Hip: cm  Scrotum: cm  Ground/Upper Thigh: cm  Mid Thigh: cm  Knee:  42.5 cm  Upper Calf: 45.4 cm  Mid Calf: 30.5 cm  Ankle: 28.4 cm  Heel to Foot: 22.8 cm  Total: 169.6 cm           Left Lower Extremity Circumferential Measurements EVAL  Waist: cm  Hip: cm  Scrotum: cm  Ground/Upper Thigh: cm  Mid Thigh: cm  Knee: 46.2 cm  Upper Calf: 54.2 cm  Mid Calf: 49.2 cm  Ankle: 36.4 cm  Heel to Foot: 24.9 cm  Total: 210.9 cm    Therapeutic Treatments and Modalities:     1. Manual Therapy (CPT 49821)    Therapeutic Treatment and Modalities Summary: MLD to L LE without trunk involvement. Focus on L LE and accompanying anastomoses.   The purpose of Manual Lymph Drainage (MLD) is to reduce lymph volume in the affected limb by increasing intake of lymphatic load into the lymphatic system, increasing the volume of transported lymph fluid, moving lymph fluid in superficial lymph vessels to collateral lymph collectors, anastomoses, or tissue channels, and increasing venous return. The goal of MLD is to re-route the lymph flow around blocked areas into more centrally located healthy lymph vessels, which drain into the venous system.      Re-applied compression.     Time-based treatments/modalities:    Physical Therapy Timed Treatment Charges  Manual therapy minutes (CPT 32629): 43 minutes      Assessment and Plan:   Assessment details:  Dorcas has reduced her L LE by an additional 3cm from previous visit. This is a new lowest reduction for her. R LE has also reduced from last time, but appears to also have reached a plateau. She will benefit from further intervention to achieve decongestion to her L LE.     Plan:  Therapy options:  Physical therapy treatment to continue  Discussed with:  Patient

## 2023-08-24 ENCOUNTER — PHYSICAL THERAPY (OUTPATIENT)
Dept: PHYSICAL THERAPY | Facility: REHABILITATION | Age: 68
End: 2023-08-24
Payer: MEDICARE

## 2023-08-24 DIAGNOSIS — I89.0 LYMPHEDEMA OF BOTH LOWER EXTREMITIES: ICD-10-CM

## 2023-08-24 PROCEDURE — 97535 SELF CARE MNGMENT TRAINING: CPT

## 2023-08-24 PROCEDURE — 97140 MANUAL THERAPY 1/> REGIONS: CPT

## 2023-08-24 NOTE — OP THERAPY DAILY TREATMENT
Outpatient Physical Therapy  LYMPHEDEMA THERAPY DAILY TREATMENT     [facility] [address].  Suite [facility]  [place] [address]  Phone:  [phone]  Fax:  [phone]    Date: 08/24/2023    Patient: Dorcas Michael  YOB: 1955  MRN: [id number]     Time Calculation    Start time: 1501  Stop time: 1547 Time Calculation (min): 46 minutes         Chief Complaint: Lymphedema Congenital    Visit #: 10    Subjective:   History of Present Illness:     Mechanism of injury:  Bandages sagging more often.       Lymphedema Objective    Left Lower Extremity Circumferential Measurements  Waist: cm  Hip: cm  Scrotum: cm  Ground/Upper Thigh: cm  Mid Thigh: cm  Knee: 45.4 cm  Upper Calf: 48.5 cm  Mid Calf: 42.1 cm  Ankle: 32.3 cm  Heel to Foot: 24.8 cm  Total: 193.1 cm    Right Lower Extremity Circumferential Measurements  Waist: cm  Hip: cm  Scrotum: cm  Ground/Upper Thigh: cm  Mid Thigh: cm  Knee: 42.8 cm  Upper Calf: 46.7 cm  Mid Calf: 33.4 cm  Ankle: 26 cm  Heel to Foot: 21.7 cm  Total: 170.6 cm        Left Lower Extremity Circumferential Measurements 8/21  Waist: cm  Hip: cm  Scrotum: cm  Ground/Upper Thigh: cm  Mid Thigh: cm  Knee: 44.1 cm  Upper Calf: 47.2 cm  Mid Calf: 40.2 cm  Ankle: 32.1 cm  Heel to Foot: 25 cm  Total: 188.6 cm     Right Lower Extremity Circumferential Measurements 8/21  Waist: cm  Hip: cm  Scrotum: cm  Ground/Upper Thigh: cm  Mid Thigh: cm  Knee: 41.1 cm  Upper Calf: 43.8 cm  Mid Calf: 29.8 cm  Ankle: 26.2 cm  Heel to Foot: 22.5 cm  Total: 163.4 cm             Left Lower Extremity Circumferential Measurements 8/18  Waist: cm  Hip: cm  Scrotum: cm  Ground/Upper Thigh: cm  Mid Thigh: cm  Knee: 46.8 cm  Upper Calf: 47.3 cm  Mid Calf: 40.5 cm  Ankle: 31.2 cm  Heel to Foot: 25.6 cm  Total: 191.4 cm     Right Lower Extremity Circumferential Measurements 8/18  Waist: cm  Hip: cm  Scrotum: cm  Ground/Upper Thigh: cm  Mid Thigh: cm  Knee: 43.6 cm  Upper Calf: 45.2  cm  Mid Calf: 30.3 cm  Ankle: 27.7 cm  Heel to Foot: 23.5 cm  Total: 170.3 cm          Left Lower Extremity Circumferential Measurements 8/10  Waist: cm  Hip: cm  Scrotum: cm  Ground/Upper Thigh: cm  Mid Thigh: cm  Knee: 46.1 cm  Upper Calf: 48.2 cm  Mid Calf: 38.1 cm  Ankle: 34.3 cm  Heel to Foot: 26 cm  Total: 192.7 cm     Right Lower Extremity Circumferential Measurements 8/10  Waist: cm  Hip: cm  Scrotum: cm  Ground/Upper Thigh: cm  Mid Thigh: cm  Knee: 42.2 cm  Upper Calf: 44.2 cm  Mid Calf: 28.3 cm  Ankle: 25.4 cm  Heel to Foot: 22.8 cm  Total: 162.9 cm          Left Lower Extremity Circumferential Measurements 8/8  Waist: cm  Hip: cm  Scrotum: cm  Ground/Upper Thigh: cm  Mid Thigh: cm  Knee: 46.6 cm  Upper Calf: 48.7 cm  Mid Calf: 43.1 cm  Ankle: 34.7 cm  Heel to Foot: 25.2 cm  Total: 198.3 cm     Right Lower Extremity Circumferential Measurements 8/8  Waist: cm  Hip: cm  Scrotum: cm  Ground/Upper Thigh: cm  Mid Thigh: cm  Knee: 43.3 cm  Upper Calf: 45.5 cm  Mid Calf: 31.4 cm  Ankle: 27.6 cm  Heel to Foot: 22.5 cm  Total: 170.3 cm          Left Lower Extremity Circumferential Measurements 7/25  Waist: cm  Hip: cm  Scrotum: cm  Ground/Upper Thigh: cm  Mid Thigh: cm  Knee: 46.4 cm  Upper Calf: 48 cm  Mid Calf: 38.5 cm  Ankle: 33.8 cm  Heel to Foot: 25.7 cm  Total: 192.4 cm     Right Lower Extremity Circumferential Measurements 7/25  Waist: cm  Hip: cm  Scrotum: cm  Ground/Upper Thigh: cm  Mid Thigh: cm  Knee: 42.5 cm  Upper Calf: 44.6 cm  Mid Calf: 29.4 cm  Ankle: 26.3 cm  Heel to Foot: 22.8 cm  Total: 165.6 cm          Left Lower Extremity Circumferential Measurements 7/18  Waist: cm  Hip: cm  Scrotum: cm  Ground/Upper Thigh: cm  Mid Thigh: cm  Knee: 46 cm  Upper Calf: 50.1 cm  Mid Calf: 42.3 cm  Ankle: 34.5 cm  Heel to Foot: 25.7 cm  Total: 198.6 cm     Right Lower Extremity Circumferential Measurements 7/18  Waist: cm  Hip: cm  Scrotum: cm  Ground/Upper Thigh: cm  Mid Thigh: cm  Knee: 42.7 cm  Upper Calf:  44.7 cm  Mid Calf: 30.7 cm  Ankle: 27.5 cm  Heel to Foot: 23.6 cm  Total: 169.2 cm          Left Lower Extremity Circumferential Measurements 7/13  Waist: cm  Hip: cm  Scrotum: cm  Ground/Upper Thigh: cm  Mid Thigh: cm  Knee: 45.2 cm  Upper Calf: 50.7 cm  Mid Calf: 44.1 cm  Ankle: 35.2 cm  Heel to Foot: 25.6 cm  Total: 200.8 cm     Right Lower Extremity Circumferential Measurements 7/13  Waist: cm  Hip: cm  Scrotum: cm  Ground/Upper Thigh: cm  Mid Thigh: cm  Knee: 42.5 cm  Upper Calf: 45.4 cm  Mid Calf: 30.5 cm  Ankle: 28.4 cm  Heel to Foot: 22.8 cm  Total: 169.6 cm           Left Lower Extremity Circumferential Measurements EVAL  Waist: cm  Hip: cm  Scrotum: cm  Ground/Upper Thigh: cm  Mid Thigh: cm  Knee: 46.2 cm  Upper Calf: 54.2 cm  Mid Calf: 49.2 cm  Ankle: 36.4 cm  Heel to Foot: 24.9 cm  Total: 210.9 cm    Therapeutic Treatments and Modalities:     1. Self Care ADL Training (CPT 55909), Discussed transitioning R LE to garment. Provided pt with brand/sizing information. CC2.    2. Manual Therapy (CPT 68449)    Therapeutic Treatment and Modalities Summary: MLD to L LE without trunk involvement. Focus on L LE and accompanying anastomoses.   The purpose of Manual Lymph Drainage (MLD) is to reduce lymph volume in the affected limb by increasing intake of lymphatic load into the lymphatic system, increasing the volume of transported lymph fluid, moving lymph fluid in superficial lymph vessels to collateral lymph collectors, anastomoses, or tissue channels, and increasing venous return. The goal of MLD is to re-route the lymph flow around blocked areas into more centrally located healthy lymph vessels, which drain into the venous system.      Time-based treatments/modalities:    Physical Therapy Timed Treatment Charges  Functional training, self care minutes (CPT 28272): 10 minutes  Manual therapy minutes (CPT 90303): 36 minutes      Assessment and Plan:   Assessment details:  Dorcas has sustained an increase to her  B LE. Suspect R LE has reached a plateau. Provided info on brand/sizing for garment. L LE can likely reduce further, but it sounds as though compression requires more frequent re-wrap as it has been sagging. Dorcas will benefit from further intervention to decongest the L LE.     Plan:  Therapy options:  Physical therapy treatment to continue  Discussed with:  Patient

## 2023-08-28 ENCOUNTER — APPOINTMENT (OUTPATIENT)
Dept: PHYSICAL THERAPY | Facility: REHABILITATION | Age: 68
End: 2023-08-28
Payer: MEDICARE

## 2023-08-29 ENCOUNTER — PHYSICAL THERAPY (OUTPATIENT)
Dept: PHYSICAL THERAPY | Facility: REHABILITATION | Age: 68
End: 2023-08-29
Payer: MEDICARE

## 2023-08-29 DIAGNOSIS — I89.0 LYMPHEDEMA OF BOTH LOWER EXTREMITIES: ICD-10-CM

## 2023-08-29 PROCEDURE — 97140 MANUAL THERAPY 1/> REGIONS: CPT

## 2023-08-29 NOTE — OP THERAPY DAILY TREATMENT
Outpatient Physical Therapy  LYMPHEDEMA THERAPY DAILY TREATMENT     [facility] Physical Therapy [address].  Suite [facility]  [place] [address]  Phone:  [phone]  Fax:  [phone]    Date: 08/29/2023    Patient: Dorcas Michael  YOB: 1955  MRN: [id number]     Time Calculation    Start time: 1501  Stop time: 1543 Time Calculation (min): 42 minutes         Chief Complaint: Lymphedema Congenital    Visit #: 11    Subjective:   History of Present Illness:     Mechanism of injury:  Legs looked almost normal over weekend.       Lymphedema Objective    Left Lower Extremity Circumferential Measurements  Waist: cm  Hip: cm  Scrotum: cm  Ground/Upper Thigh: cm  Mid Thigh: cm  Knee: 46.8 cm  Upper Calf: 47.9 cm  Mid Calf: 39.3 cm  Ankle: 33.5 cm  Heel to Foot: 25.2 cm  Total: 192.7 cm    Right Lower Extremity Circumferential Measurements  Waist: cm  Hip: cm  Scrotum: cm  Ground/Upper Thigh: cm  Mid Thigh: cm  Knee: 43.2 cm  Upper Calf: 44.7 cm  Mid Calf: 30 cm  Ankle: 26.7 cm  Heel to Foot: 22.6 cm  Total: 167.2 cm        Left Lower Extremity Circumferential Measurements 8/24  Waist: cm  Hip: cm  Scrotum: cm  Ground/Upper Thigh: cm  Mid Thigh: cm  Knee: 45.4 cm  Upper Calf: 48.5 cm  Mid Calf: 42.1 cm  Ankle: 32.3 cm  Heel to Foot: 24.8 cm  Total: 193.1 cm     Right Lower Extremity Circumferential Measurements 8/24  Waist: cm  Hip: cm  Scrotum: cm  Ground/Upper Thigh: cm  Mid Thigh: cm  Knee: 42.8 cm  Upper Calf: 46.7 cm  Mid Calf: 33.4 cm  Ankle: 26 cm  Heel to Foot: 21.7 cm  Total: 170.6 cm          Left Lower Extremity Circumferential Measurements 8/21  Waist: cm  Hip: cm  Scrotum: cm  Ground/Upper Thigh: cm  Mid Thigh: cm  Knee: 44.1 cm  Upper Calf: 47.2 cm  Mid Calf: 40.2 cm  Ankle: 32.1 cm  Heel to Foot: 25 cm  Total: 188.6 cm     Right Lower Extremity Circumferential Measurements 8/21  Waist: cm  Hip: cm  Scrotum: cm  Ground/Upper Thigh: cm  Mid Thigh: cm  Knee: 41.1 cm  Upper Calf:  43.8 cm  Mid Calf: 29.8 cm  Ankle: 26.2 cm  Heel to Foot: 22.5 cm  Total: 163.4 cm             Left Lower Extremity Circumferential Measurements 8/18  Waist: cm  Hip: cm  Scrotum: cm  Ground/Upper Thigh: cm  Mid Thigh: cm  Knee: 46.8 cm  Upper Calf: 47.3 cm  Mid Calf: 40.5 cm  Ankle: 31.2 cm  Heel to Foot: 25.6 cm  Total: 191.4 cm     Right Lower Extremity Circumferential Measurements 8/18  Waist: cm  Hip: cm  Scrotum: cm  Ground/Upper Thigh: cm  Mid Thigh: cm  Knee: 43.6 cm  Upper Calf: 45.2 cm  Mid Calf: 30.3 cm  Ankle: 27.7 cm  Heel to Foot: 23.5 cm  Total: 170.3 cm          Left Lower Extremity Circumferential Measurements 8/10  Waist: cm  Hip: cm  Scrotum: cm  Ground/Upper Thigh: cm  Mid Thigh: cm  Knee: 46.1 cm  Upper Calf: 48.2 cm  Mid Calf: 38.1 cm  Ankle: 34.3 cm  Heel to Foot: 26 cm  Total: 192.7 cm     Right Lower Extremity Circumferential Measurements 8/10  Waist: cm  Hip: cm  Scrotum: cm  Ground/Upper Thigh: cm  Mid Thigh: cm  Knee: 42.2 cm  Upper Calf: 44.2 cm  Mid Calf: 28.3 cm  Ankle: 25.4 cm  Heel to Foot: 22.8 cm  Total: 162.9 cm          Left Lower Extremity Circumferential Measurements 8/8  Waist: cm  Hip: cm  Scrotum: cm  Ground/Upper Thigh: cm  Mid Thigh: cm  Knee: 46.6 cm  Upper Calf: 48.7 cm  Mid Calf: 43.1 cm  Ankle: 34.7 cm  Heel to Foot: 25.2 cm  Total: 198.3 cm     Right Lower Extremity Circumferential Measurements 8/8  Waist: cm  Hip: cm  Scrotum: cm  Ground/Upper Thigh: cm  Mid Thigh: cm  Knee: 43.3 cm  Upper Calf: 45.5 cm  Mid Calf: 31.4 cm  Ankle: 27.6 cm  Heel to Foot: 22.5 cm  Total: 170.3 cm          Left Lower Extremity Circumferential Measurements 7/25  Waist: cm  Hip: cm  Scrotum: cm  Ground/Upper Thigh: cm  Mid Thigh: cm  Knee: 46.4 cm  Upper Calf: 48 cm  Mid Calf: 38.5 cm  Ankle: 33.8 cm  Heel to Foot: 25.7 cm  Total: 192.4 cm     Right Lower Extremity Circumferential Measurements 7/25  Waist: cm  Hip: cm  Scrotum: cm  Ground/Upper Thigh: cm  Mid Thigh: cm  Knee: 42.5  cm  Upper Calf: 44.6 cm  Mid Calf: 29.4 cm  Ankle: 26.3 cm  Heel to Foot: 22.8 cm  Total: 165.6 cm          Left Lower Extremity Circumferential Measurements 7/18  Waist: cm  Hip: cm  Scrotum: cm  Ground/Upper Thigh: cm  Mid Thigh: cm  Knee: 46 cm  Upper Calf: 50.1 cm  Mid Calf: 42.3 cm  Ankle: 34.5 cm  Heel to Foot: 25.7 cm  Total: 198.6 cm     Right Lower Extremity Circumferential Measurements 7/18  Waist: cm  Hip: cm  Scrotum: cm  Ground/Upper Thigh: cm  Mid Thigh: cm  Knee: 42.7 cm  Upper Calf: 44.7 cm  Mid Calf: 30.7 cm  Ankle: 27.5 cm  Heel to Foot: 23.6 cm  Total: 169.2 cm          Left Lower Extremity Circumferential Measurements 7/13  Waist: cm  Hip: cm  Scrotum: cm  Ground/Upper Thigh: cm  Mid Thigh: cm  Knee: 45.2 cm  Upper Calf: 50.7 cm  Mid Calf: 44.1 cm  Ankle: 35.2 cm  Heel to Foot: 25.6 cm  Total: 200.8 cm     Right Lower Extremity Circumferential Measurements 7/13  Waist: cm  Hip: cm  Scrotum: cm  Ground/Upper Thigh: cm  Mid Thigh: cm  Knee: 42.5 cm  Upper Calf: 45.4 cm  Mid Calf: 30.5 cm  Ankle: 28.4 cm  Heel to Foot: 22.8 cm  Total: 169.6 cm           Left Lower Extremity Circumferential Measurements EVAL  Waist: cm  Hip: cm  Scrotum: cm  Ground/Upper Thigh: cm  Mid Thigh: cm  Knee: 46.2 cm  Upper Calf: 54.2 cm  Mid Calf: 49.2 cm  Ankle: 36.4 cm  Heel to Foot: 24.9 cm  Total: 210.9 cm    Therapeutic Treatments and Modalities:     1. Manual Therapy (CPT 17523)    Therapeutic Treatment and Modalities Summary: MLD to L LE without trunk involvement. Focus on L LE and accompanying anastomoses.   The purpose of Manual Lymph Drainage (MLD) is to reduce lymph volume in the affected limb by increasing intake of lymphatic load into the lymphatic system, increasing the volume of transported lymph fluid, moving lymph fluid in superficial lymph vessels to collateral lymph collectors, anastomoses, or tissue channels, and increasing venous return. The goal of MLD is to re-route the lymph flow around blocked  areas into more centrally located healthy lymph vessels, which drain into the venous system.      Time-based treatments/modalities:    Physical Therapy Timed Treatment Charges  Manual therapy minutes (CPT 07644): 42 minutes      Assessment and Plan:   Assessment details:  Dorcas is sustaining her measurements ot R LE and L LE can still benefit from intervention to achieve decongestion.     Plan:  Therapy options:  Physical therapy treatment to continue  Discussed with:  Patient

## 2023-08-31 ENCOUNTER — PHYSICAL THERAPY (OUTPATIENT)
Dept: PHYSICAL THERAPY | Facility: REHABILITATION | Age: 68
End: 2023-08-31
Payer: MEDICARE

## 2023-08-31 DIAGNOSIS — I89.0 LYMPHEDEMA OF BOTH LOWER EXTREMITIES: ICD-10-CM

## 2023-08-31 PROCEDURE — 97140 MANUAL THERAPY 1/> REGIONS: CPT

## 2023-08-31 NOTE — OP THERAPY DAILY TREATMENT
Outpatient Physical Therapy  LYMPHEDEMA THERAPY DAILY TREATMENT     [facility] Physical Therapy [address].  Suite [facility]  [place] [address]  Phone:  [phone]  Fax:  [phone]    Date: 08/31/2023    Patient: Dorcas Michael  YOB: 1955  MRN: [id number]     Time Calculation    Start time: 1500  Stop time: 1551 Time Calculation (min): 51 minutes         Chief Complaint: Lymphedema Congenital    Visit #: 12    Subjective:   History of Present Illness:     Mechanism of injury:  Received stocking for R LE       Lymphedema Objective    Left Lower Extremity Circumferential Measurements  Waist: cm  Hip: cm  Scrotum: cm  Ground/Upper Thigh: cm  Mid Thigh: cm  Knee: 47.7 cm  Upper Calf: 48.8 cm  Mid Calf: 39 cm  Ankle: 31.9 cm  Heel to Foot: 24.9 cm  Total: 192.3 cm    Right Lower Extremity Circumferential Measurements  Waist: cm  Hip: cm  Scrotum: cm  Ground/Upper Thigh: cm  Mid Thigh: cm  Knee: 41.2 cm  Upper Calf: 44.9 cm  Mid Calf: 29.9 cm  Ankle: 25.7 cm  Heel to Foot: 22.4 cm  Total: 164.1 cm        Left Lower Extremity Circumferential Measurements 8/29  Waist: cm  Hip: cm  Scrotum: cm  Ground/Upper Thigh: cm  Mid Thigh: cm  Knee: 46.8 cm  Upper Calf: 47.9 cm  Mid Calf: 39.3 cm  Ankle: 33.5 cm  Heel to Foot: 25.2 cm  Total: 192.7 cm     Right Lower Extremity Circumferential Measurements 8/29  Waist: cm  Hip: cm  Scrotum: cm  Ground/Upper Thigh: cm  Mid Thigh: cm  Knee: 43.2 cm  Upper Calf: 44.7 cm  Mid Calf: 30 cm  Ankle: 26.7 cm  Heel to Foot: 22.6 cm  Total: 167.2 cm    Left Lower Extremity Circumferential Measurements 8/24  Waist: cm  Hip: cm  Scrotum: cm  Ground/Upper Thigh: cm  Mid Thigh: cm  Knee: 45.4 cm  Upper Calf: 48.5 cm  Mid Calf: 42.1 cm  Ankle: 32.3 cm  Heel to Foot: 24.8 cm  Total: 193.1 cm     Right Lower Extremity Circumferential Measurements 8/24  Waist: cm  Hip: cm  Scrotum: cm  Ground/Upper Thigh: cm  Mid Thigh: cm  Knee: 42.8 cm  Upper Calf: 46.7 cm  Mid Calf:  33.4 cm  Ankle: 26 cm  Heel to Foot: 21.7 cm  Total: 170.6 cm          Left Lower Extremity Circumferential Measurements 8/21  Waist: cm  Hip: cm  Scrotum: cm  Ground/Upper Thigh: cm  Mid Thigh: cm  Knee: 44.1 cm  Upper Calf: 47.2 cm  Mid Calf: 40.2 cm  Ankle: 32.1 cm  Heel to Foot: 25 cm  Total: 188.6 cm     Right Lower Extremity Circumferential Measurements 8/21  Waist: cm  Hip: cm  Scrotum: cm  Ground/Upper Thigh: cm  Mid Thigh: cm  Knee: 41.1 cm  Upper Calf: 43.8 cm  Mid Calf: 29.8 cm  Ankle: 26.2 cm  Heel to Foot: 22.5 cm  Total: 163.4 cm             Left Lower Extremity Circumferential Measurements 8/18  Waist: cm  Hip: cm  Scrotum: cm  Ground/Upper Thigh: cm  Mid Thigh: cm  Knee: 46.8 cm  Upper Calf: 47.3 cm  Mid Calf: 40.5 cm  Ankle: 31.2 cm  Heel to Foot: 25.6 cm  Total: 191.4 cm     Right Lower Extremity Circumferential Measurements 8/18  Waist: cm  Hip: cm  Scrotum: cm  Ground/Upper Thigh: cm  Mid Thigh: cm  Knee: 43.6 cm  Upper Calf: 45.2 cm  Mid Calf: 30.3 cm  Ankle: 27.7 cm  Heel to Foot: 23.5 cm  Total: 170.3 cm          Left Lower Extremity Circumferential Measurements 8/10  Waist: cm  Hip: cm  Scrotum: cm  Ground/Upper Thigh: cm  Mid Thigh: cm  Knee: 46.1 cm  Upper Calf: 48.2 cm  Mid Calf: 38.1 cm  Ankle: 34.3 cm  Heel to Foot: 26 cm  Total: 192.7 cm     Right Lower Extremity Circumferential Measurements 8/10  Waist: cm  Hip: cm  Scrotum: cm  Ground/Upper Thigh: cm  Mid Thigh: cm  Knee: 42.2 cm  Upper Calf: 44.2 cm  Mid Calf: 28.3 cm  Ankle: 25.4 cm  Heel to Foot: 22.8 cm  Total: 162.9 cm          Left Lower Extremity Circumferential Measurements 8/8  Waist: cm  Hip: cm  Scrotum: cm  Ground/Upper Thigh: cm  Mid Thigh: cm  Knee: 46.6 cm  Upper Calf: 48.7 cm  Mid Calf: 43.1 cm  Ankle: 34.7 cm  Heel to Foot: 25.2 cm  Total: 198.3 cm     Right Lower Extremity Circumferential Measurements 8/8  Waist: cm  Hip: cm  Scrotum: cm  Ground/Upper Thigh: cm  Mid Thigh: cm  Knee: 43.3 cm  Upper Calf: 45.5 cm  Mid  Calf: 31.4 cm  Ankle: 27.6 cm  Heel to Foot: 22.5 cm  Total: 170.3 cm          Left Lower Extremity Circumferential Measurements 7/25  Waist: cm  Hip: cm  Scrotum: cm  Ground/Upper Thigh: cm  Mid Thigh: cm  Knee: 46.4 cm  Upper Calf: 48 cm  Mid Calf: 38.5 cm  Ankle: 33.8 cm  Heel to Foot: 25.7 cm  Total: 192.4 cm     Right Lower Extremity Circumferential Measurements 7/25  Waist: cm  Hip: cm  Scrotum: cm  Ground/Upper Thigh: cm  Mid Thigh: cm  Knee: 42.5 cm  Upper Calf: 44.6 cm  Mid Calf: 29.4 cm  Ankle: 26.3 cm  Heel to Foot: 22.8 cm  Total: 165.6 cm          Left Lower Extremity Circumferential Measurements 7/18  Waist: cm  Hip: cm  Scrotum: cm  Ground/Upper Thigh: cm  Mid Thigh: cm  Knee: 46 cm  Upper Calf: 50.1 cm  Mid Calf: 42.3 cm  Ankle: 34.5 cm  Heel to Foot: 25.7 cm  Total: 198.6 cm     Right Lower Extremity Circumferential Measurements 7/18  Waist: cm  Hip: cm  Scrotum: cm  Ground/Upper Thigh: cm  Mid Thigh: cm  Knee: 42.7 cm  Upper Calf: 44.7 cm  Mid Calf: 30.7 cm  Ankle: 27.5 cm  Heel to Foot: 23.6 cm  Total: 169.2 cm          Left Lower Extremity Circumferential Measurements 7/13  Waist: cm  Hip: cm  Scrotum: cm  Ground/Upper Thigh: cm  Mid Thigh: cm  Knee: 45.2 cm  Upper Calf: 50.7 cm  Mid Calf: 44.1 cm  Ankle: 35.2 cm  Heel to Foot: 25.6 cm  Total: 200.8 cm     Right Lower Extremity Circumferential Measurements 7/13  Waist: cm  Hip: cm  Scrotum: cm  Ground/Upper Thigh: cm  Mid Thigh: cm  Knee: 42.5 cm  Upper Calf: 45.4 cm  Mid Calf: 30.5 cm  Ankle: 28.4 cm  Heel to Foot: 22.8 cm  Total: 169.6 cm           Left Lower Extremity Circumferential Measurements EVAL  Waist: cm  Hip: cm  Scrotum: cm  Ground/Upper Thigh: cm  Mid Thigh: cm  Knee: 46.2 cm  Upper Calf: 54.2 cm  Mid Calf: 49.2 cm  Ankle: 36.4 cm  Heel to Foot: 24.9 cm  Total: 210.9 cm       Therapeutic Treatments and Modalities:     1. Manual Therapy (CPT 28888)    Therapeutic Treatment and Modalities Summary: MLD to L LE without trunk  involvement. Focus on L LE and accompanying anastomoses.   The purpose of Manual Lymph Drainage (MLD) is to reduce lymph volume in the affected limb by increasing intake of lymphatic load into the lymphatic system, increasing the volume of transported lymph fluid, moving lymph fluid in superficial lymph vessels to collateral lymph collectors, anastomoses, or tissue channels, and increasing venous return. The goal of MLD is to re-route the lymph flow around blocked areas into more centrally located healthy lymph vessels, which drain into the venous system.      Time-based treatments/modalities:    Physical Therapy Timed Treatment Charges  Manual therapy minutes (CPT 48635): 51 minutes      Assessment and Plan:   Assessment details:  Dorcas has sustained a reduction to her R LE and her L LE is sustaining. She may benefit from measurement for custom garments. WIll benefit from ongoing intervention to sustain current reduction.     Plan:  Therapy options:  Physical therapy treatment to continue  Discussed with:  Patient

## 2023-09-12 ENCOUNTER — PHYSICAL THERAPY (OUTPATIENT)
Dept: PHYSICAL THERAPY | Facility: REHABILITATION | Age: 68
End: 2023-09-12
Payer: MEDICARE

## 2023-09-12 DIAGNOSIS — I89.0 LYMPHEDEMA OF BOTH LOWER EXTREMITIES: ICD-10-CM

## 2023-09-12 PROCEDURE — 97535 SELF CARE MNGMENT TRAINING: CPT

## 2023-09-12 PROCEDURE — 97140 MANUAL THERAPY 1/> REGIONS: CPT

## 2023-09-12 NOTE — OP THERAPY DAILY TREATMENT
Outpatient Physical Therapy  LYMPHEDEMA THERAPY DAILY TREATMENT     [facility] Physical Therapy [address].  [address]  [place] [address]  Phone:  [phone]  Fax:  [phone]    Date: 09/12/2023    Patient: Dorcas Michael  YOB: 1955  MRN: [id number]     Time Calculation    Start time: 1500  Stop time: 1544 Time Calculation (min): 44 minutes         Chief Complaint: Lymphedema Congenital    Visit #: 13    Subjective:   History of Present Illness:     Mechanism of injury:  Doing well. Able to re-wrap and wear stocking on R LE.      Lymphedema Objective    Left Lower Extremity Circumferential Measurements  Waist: cm  Hip: cm  Scrotum: cm  Ground/Upper Thigh: cm  Mid Thigh: cm  Knee: 47.7 cm  Upper Calf: 48.5 cm  Mid Calf: 39.2 cm  Ankle: 33.4 cm  Heel to Foot: 25.3 cm  Total: 194.1 cm    Right Lower Extremity Circumferential Measurements  Waist: cm  Hip: cm  Scrotum: cm  Ground/Upper Thigh: cm  Mid Thigh: cm  Knee: 40.8 cm  Upper Calf: 45.3 cm  Mid Calf: 28.2 cm  Ankle: 26.9 cm  Heel to Foot: 22.8 cm  Total: 164 cm        Left Lower Extremity Circumferential Measurements 8/31  Waist: cm  Hip: cm  Scrotum: cm  Ground/Upper Thigh: cm  Mid Thigh: cm  Knee: 47.7 cm  Upper Calf: 48.8 cm  Mid Calf: 39 cm  Ankle: 31.9 cm  Heel to Foot: 24.9 cm  Total: 192.3 cm     Right Lower Extremity Circumferential Measurements 8/31  Waist: cm  Hip: cm  Scrotum: cm  Ground/Upper Thigh: cm  Mid Thigh: cm  Knee: 41.2 cm  Upper Calf: 44.9 cm  Mid Calf: 29.9 cm  Ankle: 25.7 cm  Heel to Foot: 22.4 cm  Total: 164.1 cm          Left Lower Extremity Circumferential Measurements 8/29  Waist: cm  Hip: cm  Scrotum: cm  Ground/Upper Thigh: cm  Mid Thigh: cm  Knee: 46.8 cm  Upper Calf: 47.9 cm  Mid Calf: 39.3 cm  Ankle: 33.5 cm  Heel to Foot: 25.2 cm  Total: 192.7 cm     Right Lower Extremity Circumferential Measurements 8/29  Waist: cm  Hip: cm  Scrotum: cm  Ground/Upper Thigh: cm  Mid Thigh: cm  Knee: 43.2  cm  Upper Calf: 44.7 cm  Mid Calf: 30 cm  Ankle: 26.7 cm  Heel to Foot: 22.6 cm  Total: 167.2 cm     Left Lower Extremity Circumferential Measurements 8/24  Waist: cm  Hip: cm  Scrotum: cm  Ground/Upper Thigh: cm  Mid Thigh: cm  Knee: 45.4 cm  Upper Calf: 48.5 cm  Mid Calf: 42.1 cm  Ankle: 32.3 cm  Heel to Foot: 24.8 cm  Total: 193.1 cm     Right Lower Extremity Circumferential Measurements 8/24  Waist: cm  Hip: cm  Scrotum: cm  Ground/Upper Thigh: cm  Mid Thigh: cm  Knee: 42.8 cm  Upper Calf: 46.7 cm  Mid Calf: 33.4 cm  Ankle: 26 cm  Heel to Foot: 21.7 cm  Total: 170.6 cm          Left Lower Extremity Circumferential Measurements 8/21  Waist: cm  Hip: cm  Scrotum: cm  Ground/Upper Thigh: cm  Mid Thigh: cm  Knee: 44.1 cm  Upper Calf: 47.2 cm  Mid Calf: 40.2 cm  Ankle: 32.1 cm  Heel to Foot: 25 cm  Total: 188.6 cm     Right Lower Extremity Circumferential Measurements 8/21  Waist: cm  Hip: cm  Scrotum: cm  Ground/Upper Thigh: cm  Mid Thigh: cm  Knee: 41.1 cm  Upper Calf: 43.8 cm  Mid Calf: 29.8 cm  Ankle: 26.2 cm  Heel to Foot: 22.5 cm  Total: 163.4 cm             Left Lower Extremity Circumferential Measurements 8/18  Waist: cm  Hip: cm  Scrotum: cm  Ground/Upper Thigh: cm  Mid Thigh: cm  Knee: 46.8 cm  Upper Calf: 47.3 cm  Mid Calf: 40.5 cm  Ankle: 31.2 cm  Heel to Foot: 25.6 cm  Total: 191.4 cm     Right Lower Extremity Circumferential Measurements 8/18  Waist: cm  Hip: cm  Scrotum: cm  Ground/Upper Thigh: cm  Mid Thigh: cm  Knee: 43.6 cm  Upper Calf: 45.2 cm  Mid Calf: 30.3 cm  Ankle: 27.7 cm  Heel to Foot: 23.5 cm  Total: 170.3 cm          Left Lower Extremity Circumferential Measurements 8/10  Waist: cm  Hip: cm  Scrotum: cm  Ground/Upper Thigh: cm  Mid Thigh: cm  Knee: 46.1 cm  Upper Calf: 48.2 cm  Mid Calf: 38.1 cm  Ankle: 34.3 cm  Heel to Foot: 26 cm  Total: 192.7 cm     Right Lower Extremity Circumferential Measurements 8/10  Waist: cm  Hip: cm  Scrotum: cm  Ground/Upper Thigh: cm  Mid Thigh: cm  Knee: 42.2  cm  Upper Calf: 44.2 cm  Mid Calf: 28.3 cm  Ankle: 25.4 cm  Heel to Foot: 22.8 cm  Total: 162.9 cm          Left Lower Extremity Circumferential Measurements 8/8  Waist: cm  Hip: cm  Scrotum: cm  Ground/Upper Thigh: cm  Mid Thigh: cm  Knee: 46.6 cm  Upper Calf: 48.7 cm  Mid Calf: 43.1 cm  Ankle: 34.7 cm  Heel to Foot: 25.2 cm  Total: 198.3 cm     Right Lower Extremity Circumferential Measurements 8/8  Waist: cm  Hip: cm  Scrotum: cm  Ground/Upper Thigh: cm  Mid Thigh: cm  Knee: 43.3 cm  Upper Calf: 45.5 cm  Mid Calf: 31.4 cm  Ankle: 27.6 cm  Heel to Foot: 22.5 cm  Total: 170.3 cm          Left Lower Extremity Circumferential Measurements 7/25  Waist: cm  Hip: cm  Scrotum: cm  Ground/Upper Thigh: cm  Mid Thigh: cm  Knee: 46.4 cm  Upper Calf: 48 cm  Mid Calf: 38.5 cm  Ankle: 33.8 cm  Heel to Foot: 25.7 cm  Total: 192.4 cm     Right Lower Extremity Circumferential Measurements 7/25  Waist: cm  Hip: cm  Scrotum: cm  Ground/Upper Thigh: cm  Mid Thigh: cm  Knee: 42.5 cm  Upper Calf: 44.6 cm  Mid Calf: 29.4 cm  Ankle: 26.3 cm  Heel to Foot: 22.8 cm  Total: 165.6 cm          Left Lower Extremity Circumferential Measurements 7/18  Waist: cm  Hip: cm  Scrotum: cm  Ground/Upper Thigh: cm  Mid Thigh: cm  Knee: 46 cm  Upper Calf: 50.1 cm  Mid Calf: 42.3 cm  Ankle: 34.5 cm  Heel to Foot: 25.7 cm  Total: 198.6 cm     Right Lower Extremity Circumferential Measurements 7/18  Waist: cm  Hip: cm  Scrotum: cm  Ground/Upper Thigh: cm  Mid Thigh: cm  Knee: 42.7 cm  Upper Calf: 44.7 cm  Mid Calf: 30.7 cm  Ankle: 27.5 cm  Heel to Foot: 23.6 cm  Total: 169.2 cm          Left Lower Extremity Circumferential Measurements 7/13  Waist: cm  Hip: cm  Scrotum: cm  Ground/Upper Thigh: cm  Mid Thigh: cm  Knee: 45.2 cm  Upper Calf: 50.7 cm  Mid Calf: 44.1 cm  Ankle: 35.2 cm  Heel to Foot: 25.6 cm  Total: 200.8 cm     Right Lower Extremity Circumferential Measurements 7/13  Waist: cm  Hip: cm  Scrotum: cm  Ground/Upper Thigh: cm  Mid Thigh: cm  Knee:  42.5 cm  Upper Calf: 45.4 cm  Mid Calf: 30.5 cm  Ankle: 28.4 cm  Heel to Foot: 22.8 cm  Total: 169.6 cm           Left Lower Extremity Circumferential Measurements EVAL  Waist: cm  Hip: cm  Scrotum: cm  Ground/Upper Thigh: cm  Mid Thigh: cm  Knee: 46.2 cm  Upper Calf: 54.2 cm  Mid Calf: 49.2 cm  Ankle: 36.4 cm  Heel to Foot: 24.9 cm  Total: 210.9 cm    Therapeutic Treatments and Modalities:     1. Self Care ADL Training (CPT 68872), Measured pt for custom B knee-high stockings CC1. Flat knit.    2. Manual Therapy (CPT 23266)    Therapeutic Treatment and Modalities Summary: MLD to L LE without trunk involvement. Focus on L LE and accompanying anastomoses.   The purpose of Manual Lymph Drainage (MLD) is to reduce lymph volume in the affected limb by increasing intake of lymphatic load into the lymphatic system, increasing the volume of transported lymph fluid, moving lymph fluid in superficial lymph vessels to collateral lymph collectors, anastomoses, or tissue channels, and increasing venous return. The goal of MLD is to re-route the lymph flow around blocked areas into more centrally located healthy lymph vessels, which drain into the venous system.      Time-based treatments/modalities:    Physical Therapy Timed Treatment Charges  Functional training, self care minutes (CPT 64004): 10 minutes  Manual therapy minutes (CPT 70849): 34 minutes      Assessment and Plan:   Assessment details:  Dorcas is sustaining her measurements. It is likely she has reached a plateau. She has been measured for custom garments today and will benefit from further intervention to sustain her current decongestion.    Plan:  Therapy options:  Physical therapy treatment to continue  Discussed with:  Patient

## 2023-09-12 NOTE — OP THERAPY PROGRESS SUMMARY
Outpatient Physical Therapy  PROGRESS SUMMARY NOTE      [facility] Physical Therapy [address]  [address]  [place] [address]  Phone:  [phone]  Fax:  [phone]    Date of Visit: 09/12/2023    Patient: Dorcas Michael  YOB: 1955  MRN: [id number]     Referring Provider: No referring provider defined for this encounter.   Referring Diagnosis Lymphedema, not elsewhere classified [I89.0]     Visit Diagnoses     ICD-10-CM   1. Lymphedema of both lower extremities  I89.0       Rehab Potential: good    Progress Report Period: 8/8/23-9/12/23    Functional Assessment Used          Objective Findings and Assessment:   Patient progression towards goals: Dorcas has reduced her B LE an additional 4-6cm each since her previous progress note. She has transitioned her R LE to a garment, but fit is not ideal. Today, measured Dorcas for a custom flat-knit garment that will allow for better containment and flow versus an OTC garment. She will benefit from further intervention to decongest her L LE and sustain her gains on the right.     Objective findings and assessment details:   Left Lower Extremity Circumferential Measurements 9/12  Knee: 47.7 cm  Upper Calf: 48.5 cm  Mid Calf: 39.2 cm  Ankle: 33.4 cm  Heel to Foot: 25.3 cm  Total: 194.1 cm     Right Lower Extremity Circumferential Measurements 9/12  Knee: 40.8 cm  Upper Calf: 45.3 cm  Mid Calf: 28.2 cm  Ankle: 26.9 cm  Heel to Foot: 22.8 cm  Total: 164 cm    Left Lower Extremity Circumferential Measurements 8/8  Knee: 46.6 cm  Upper Calf: 48.7 cm  Mid Calf: 43.1 cm  Ankle: 34.7 cm  Heel to Foot: 25.2 cm  Total: 198.3 cm     Right Lower Extremity Circumferential Measurements 8/8  Knee: 43.3 cm  Upper Calf: 45.5 cm  Mid Calf: 31.4 cm  Ankle: 27.6 cm  Heel to Foot: 22.5 cm  Total: 170.3 cm        Left Lower Extremity Circumferential Measurements EVAL  Knee: 46.2 cm  Upper Calf: 54.2 cm  Mid Calf: 49.2 cm  Ankle: 36.4 cm  Heel to Foot: 24.9  cm  Total: 210.9 cm      Goals:   Short Term Goals:   1. Pt will achieve a total limb circumference reduction of 10cm in order to decrease risk for infection and progression of disease process.Goal Met  2. Pt will be independent with self-MLD to facilitate fluid migration to healthy tissues and lymph nodes. Goal Met  3. Pt will consistently perform exercises with bandages on to facilitate muscle pump of fluid from affected extremity. GOal Met  4. Pt will achieve a total limb circumference reduction of an additional 10cm in order to decrease risk for infection and progression of disease process. Goal Not Met  Short term goal time span:  2-4 weeks      Long Term Goals:    1. Pt will have a reduction in total limb circumference of 20cm in order to decrease risk for infection and progression of disease process. Goal Not Met  2. Patient will be independent with maintenance phase management of lymphedema including: compression garments, skin care education, risk reduction strategies, manual lymphatic drainage, and therapeutic exercise. Goal Partially Met    Long term goal time span:  4-6 weeks    Plan:   Planned therapy interventions:  Functional Training, Self Care (CPT 91749), Manual Therapy (CPT 62235), Therapeutic Activities (CPT 22590) and Therapeutic Exercise (CPT 48937)  Frequency:  2x week  Duration in weeks:  8      Referring provider co-signature:  I have reviewed this plan of care and my co-signature certifies the need for services.     Certification Period: 09/12/2023 to 11/08/23    Physician Signature: ________________________________ Date: ______________

## 2023-09-14 ENCOUNTER — PHYSICAL THERAPY (OUTPATIENT)
Dept: PHYSICAL THERAPY | Facility: REHABILITATION | Age: 68
End: 2023-09-14
Attending: FAMILY MEDICINE
Payer: MEDICARE

## 2023-09-14 DIAGNOSIS — I89.0 LYMPHEDEMA OF BOTH LOWER EXTREMITIES: ICD-10-CM

## 2023-09-14 PROCEDURE — 97140 MANUAL THERAPY 1/> REGIONS: CPT

## 2023-09-14 NOTE — OP THERAPY DAILY TREATMENT
Outpatient Physical Therapy  LYMPHEDEMA THERAPY DAILY TREATMENT     [facility] Physical Therapy [address] [place] [address]  [address]  [place] [address]  Phone:  [phone]  Fax:  [phone]    Date: 09/14/2023    Patient: Dorcas Mcihael  YOB: 1955  MRN: [id number]     Time Calculation    Start time: 1500  Stop time: 1541 Time Calculation (min): 41 minutes         Chief Complaint: Lymphedema Congenital    Visit #: 14    Subjective:   History of Present Illness:     Mechanism of injury:  R stocking has been irritating to leg.       Lymphedema Objective    Left Lower Extremity Circumferential Measurements  Waist: cm  Hip: cm  Scrotum: cm  Ground/Upper Thigh: cm  Mid Thigh: cm  Knee: 47.3 cm  Upper Calf: 47.7 cm  Mid Calf: 37.3 cm  Ankle: 32.7 cm  Heel to Foot: 25.5 cm  Total: 190.5 cm          Left Lower Extremity Circumferential Measurements 9/12  Waist: cm  Hip: cm  Scrotum: cm  Ground/Upper Thigh: cm  Mid Thigh: cm  Knee: 47.7 cm  Upper Calf: 48.5 cm  Mid Calf: 39.2 cm  Ankle: 33.4 cm  Heel to Foot: 25.3 cm  Total: 194.1 cm     Right Lower Extremity Circumferential Measurements 9/12  Waist: cm  Hip: cm  Scrotum: cm  Ground/Upper Thigh: cm  Mid Thigh: cm  Knee: 40.8 cm  Upper Calf: 45.3 cm  Mid Calf: 28.2 cm  Ankle: 26.9 cm  Heel to Foot: 22.8 cm  Total: 164 cm          Left Lower Extremity Circumferential Measurements 8/31  Waist: cm  Hip: cm  Scrotum: cm  Ground/Upper Thigh: cm  Mid Thigh: cm  Knee: 47.7 cm  Upper Calf: 48.8 cm  Mid Calf: 39 cm  Ankle: 31.9 cm  Heel to Foot: 24.9 cm  Total: 192.3 cm     Right Lower Extremity Circumferential Measurements 8/31  Waist: cm  Hip: cm  Scrotum: cm  Ground/Upper Thigh: cm  Mid Thigh: cm  Knee: 41.2 cm  Upper Calf: 44.9 cm  Mid Calf: 29.9 cm  Ankle: 25.7 cm  Heel to Foot: 22.4 cm  Total: 164.1 cm          Left Lower Extremity Circumferential Measurements 8/29  Waist: cm  Hip: cm  Scrotum: cm  Ground/Upper Thigh: cm  Mid Thigh: cm  Knee: 46.8  cm  Upper Calf: 47.9 cm  Mid Calf: 39.3 cm  Ankle: 33.5 cm  Heel to Foot: 25.2 cm  Total: 192.7 cm     Right Lower Extremity Circumferential Measurements 8/29  Waist: cm  Hip: cm  Scrotum: cm  Ground/Upper Thigh: cm  Mid Thigh: cm  Knee: 43.2 cm  Upper Calf: 44.7 cm  Mid Calf: 30 cm  Ankle: 26.7 cm  Heel to Foot: 22.6 cm  Total: 167.2 cm     Left Lower Extremity Circumferential Measurements 8/24  Waist: cm  Hip: cm  Scrotum: cm  Ground/Upper Thigh: cm  Mid Thigh: cm  Knee: 45.4 cm  Upper Calf: 48.5 cm  Mid Calf: 42.1 cm  Ankle: 32.3 cm  Heel to Foot: 24.8 cm  Total: 193.1 cm     Right Lower Extremity Circumferential Measurements 8/24  Waist: cm  Hip: cm  Scrotum: cm  Ground/Upper Thigh: cm  Mid Thigh: cm  Knee: 42.8 cm  Upper Calf: 46.7 cm  Mid Calf: 33.4 cm  Ankle: 26 cm  Heel to Foot: 21.7 cm  Total: 170.6 cm          Left Lower Extremity Circumferential Measurements 8/21  Waist: cm  Hip: cm  Scrotum: cm  Ground/Upper Thigh: cm  Mid Thigh: cm  Knee: 44.1 cm  Upper Calf: 47.2 cm  Mid Calf: 40.2 cm  Ankle: 32.1 cm  Heel to Foot: 25 cm  Total: 188.6 cm     Right Lower Extremity Circumferential Measurements 8/21  Waist: cm  Hip: cm  Scrotum: cm  Ground/Upper Thigh: cm  Mid Thigh: cm  Knee: 41.1 cm  Upper Calf: 43.8 cm  Mid Calf: 29.8 cm  Ankle: 26.2 cm  Heel to Foot: 22.5 cm  Total: 163.4 cm             Left Lower Extremity Circumferential Measurements 8/18  Waist: cm  Hip: cm  Scrotum: cm  Ground/Upper Thigh: cm  Mid Thigh: cm  Knee: 46.8 cm  Upper Calf: 47.3 cm  Mid Calf: 40.5 cm  Ankle: 31.2 cm  Heel to Foot: 25.6 cm  Total: 191.4 cm     Right Lower Extremity Circumferential Measurements 8/18  Waist: cm  Hip: cm  Scrotum: cm  Ground/Upper Thigh: cm  Mid Thigh: cm  Knee: 43.6 cm  Upper Calf: 45.2 cm  Mid Calf: 30.3 cm  Ankle: 27.7 cm  Heel to Foot: 23.5 cm  Total: 170.3 cm          Left Lower Extremity Circumferential Measurements 8/10  Waist: cm  Hip: cm  Scrotum: cm  Ground/Upper Thigh: cm  Mid Thigh: cm  Knee:  46.1 cm  Upper Calf: 48.2 cm  Mid Calf: 38.1 cm  Ankle: 34.3 cm  Heel to Foot: 26 cm  Total: 192.7 cm     Right Lower Extremity Circumferential Measurements 8/10  Waist: cm  Hip: cm  Scrotum: cm  Ground/Upper Thigh: cm  Mid Thigh: cm  Knee: 42.2 cm  Upper Calf: 44.2 cm  Mid Calf: 28.3 cm  Ankle: 25.4 cm  Heel to Foot: 22.8 cm  Total: 162.9 cm          Left Lower Extremity Circumferential Measurements 8/8  Waist: cm  Hip: cm  Scrotum: cm  Ground/Upper Thigh: cm  Mid Thigh: cm  Knee: 46.6 cm  Upper Calf: 48.7 cm  Mid Calf: 43.1 cm  Ankle: 34.7 cm  Heel to Foot: 25.2 cm  Total: 198.3 cm     Right Lower Extremity Circumferential Measurements 8/8  Waist: cm  Hip: cm  Scrotum: cm  Ground/Upper Thigh: cm  Mid Thigh: cm  Knee: 43.3 cm  Upper Calf: 45.5 cm  Mid Calf: 31.4 cm  Ankle: 27.6 cm  Heel to Foot: 22.5 cm  Total: 170.3 cm          Left Lower Extremity Circumferential Measurements 7/25  Waist: cm  Hip: cm  Scrotum: cm  Ground/Upper Thigh: cm  Mid Thigh: cm  Knee: 46.4 cm  Upper Calf: 48 cm  Mid Calf: 38.5 cm  Ankle: 33.8 cm  Heel to Foot: 25.7 cm  Total: 192.4 cm     Right Lower Extremity Circumferential Measurements 7/25  Waist: cm  Hip: cm  Scrotum: cm  Ground/Upper Thigh: cm  Mid Thigh: cm  Knee: 42.5 cm  Upper Calf: 44.6 cm  Mid Calf: 29.4 cm  Ankle: 26.3 cm  Heel to Foot: 22.8 cm  Total: 165.6 cm          Left Lower Extremity Circumferential Measurements 7/18  Waist: cm  Hip: cm  Scrotum: cm  Ground/Upper Thigh: cm  Mid Thigh: cm  Knee: 46 cm  Upper Calf: 50.1 cm  Mid Calf: 42.3 cm  Ankle: 34.5 cm  Heel to Foot: 25.7 cm  Total: 198.6 cm     Right Lower Extremity Circumferential Measurements 7/18  Waist: cm  Hip: cm  Scrotum: cm  Ground/Upper Thigh: cm  Mid Thigh: cm  Knee: 42.7 cm  Upper Calf: 44.7 cm  Mid Calf: 30.7 cm  Ankle: 27.5 cm  Heel to Foot: 23.6 cm  Total: 169.2 cm          Left Lower Extremity Circumferential Measurements 7/13  Waist: cm  Hip: cm  Scrotum: cm  Ground/Upper Thigh: cm  Mid Thigh:  cm  Knee: 45.2 cm  Upper Calf: 50.7 cm  Mid Calf: 44.1 cm  Ankle: 35.2 cm  Heel to Foot: 25.6 cm  Total: 200.8 cm     Right Lower Extremity Circumferential Measurements 7/13  Waist: cm  Hip: cm  Scrotum: cm  Ground/Upper Thigh: cm  Mid Thigh: cm  Knee: 42.5 cm  Upper Calf: 45.4 cm  Mid Calf: 30.5 cm  Ankle: 28.4 cm  Heel to Foot: 22.8 cm  Total: 169.6 cm           Left Lower Extremity Circumferential Measurements EVAL  Waist: cm  Hip: cm  Scrotum: cm  Ground/Upper Thigh: cm  Mid Thigh: cm  Knee: 46.2 cm  Upper Calf: 54.2 cm  Mid Calf: 49.2 cm  Ankle: 36.4 cm  Heel to Foot: 24.9 cm  Total: 210.9 cm    Therapeutic Treatments and Modalities:     1. Manual Therapy (CPT 47167)    Therapeutic Treatment and Modalities Summary: MLD to L LE without trunk involvement. Focus on L LE and accompanying anastomoses.   The purpose of Manual Lymph Drainage (MLD) is to reduce lymph volume in the affected limb by increasing intake of lymphatic load into the lymphatic system, increasing the volume of transported lymph fluid, moving lymph fluid in superficial lymph vessels to collateral lymph collectors, anastomoses, or tissue channels, and increasing venous return. The goal of MLD is to re-route the lymph flow around blocked areas into more centrally located healthy lymph vessels, which drain into the venous system.      Time-based treatments/modalities:    Physical Therapy Timed Treatment Charges  Manual therapy minutes (CPT 42456): 41 minutes      Assessment and Plan:   Assessment details:  Dorcas has reduced her L LE by 4cm. She is now awaiting custom garments and will benefit from further intervention to sustain her decongestion.     Plan:  Therapy options:  Physical therapy treatment to continue  Discussed with:  Patient

## 2023-09-20 DIAGNOSIS — M18.0 PRIMARY ARTHROSIS OF FIRST CARPOMETACARPAL JOINTS, BILATERAL: ICD-10-CM

## 2023-09-20 RX ORDER — CELECOXIB 200 MG/1
200 CAPSULE ORAL
Qty: 90 CAPSULE | Refills: 0 | Status: SHIPPED | OUTPATIENT
Start: 2023-09-20 | End: 2023-12-21

## 2023-10-09 ENCOUNTER — PHYSICAL THERAPY (OUTPATIENT)
Dept: PHYSICAL THERAPY | Facility: REHABILITATION | Age: 68
End: 2023-10-09
Payer: MEDICARE

## 2023-10-09 DIAGNOSIS — I89.0 LYMPHEDEMA OF BOTH LOWER EXTREMITIES: ICD-10-CM

## 2023-10-09 PROCEDURE — 97535 SELF CARE MNGMENT TRAINING: CPT

## 2023-10-09 PROCEDURE — 97140 MANUAL THERAPY 1/> REGIONS: CPT

## 2023-10-09 NOTE — OP THERAPY DAILY TREATMENT
Outpatient Physical Therapy  LYMPHEDEMA THERAPY DAILY TREATMENT     [facility] Physical Therapy [address].  [address] [facility]  [place] [address]  Phone:  [phone]  Fax:  [phone]    Date: 10/09/2023    Patient: Dorcas Michael  YOB: 1955  MRN: [id number]     Time Calculation    Start time: 1501  Stop time: 1544 Time Calculation (min): 43 minutes         Chief Complaint: Lymphedema Congenital    Visit #: 15    Subjective:   History of Present Illness:     Mechanism of injury:  Did well with alternating sock and wraps during her vacation.       Lymphedema Objective    Left Lower Extremity Circumferential Measurements  Waist: cm  Hip: cm  Scrotum: cm  Ground/Upper Thigh: cm  Mid Thigh: cm  Knee: 44.7 cm  Upper Calf: 45.3 cm  Mid Calf: 39.3 cm  Ankle: 33.7 cm  Heel to Foot: 24.3 cm  Total: 187.3 cm          Left Lower Extremity Circumferential Measurements 9/14  Waist: cm  Hip: cm  Scrotum: cm  Ground/Upper Thigh: cm  Mid Thigh: cm  Knee: 47.3 cm  Upper Calf: 47.7 cm  Mid Calf: 37.3 cm  Ankle: 32.7 cm  Heel to Foot: 25.5 cm  Total: 190.5 cm             Left Lower Extremity Circumferential Measurements 9/12  Waist: cm  Hip: cm  Scrotum: cm  Ground/Upper Thigh: cm  Mid Thigh: cm  Knee: 47.7 cm  Upper Calf: 48.5 cm  Mid Calf: 39.2 cm  Ankle: 33.4 cm  Heel to Foot: 25.3 cm  Total: 194.1 cm     Right Lower Extremity Circumferential Measurements 9/12  Waist: cm  Hip: cm  Scrotum: cm  Ground/Upper Thigh: cm  Mid Thigh: cm  Knee: 40.8 cm  Upper Calf: 45.3 cm  Mid Calf: 28.2 cm  Ankle: 26.9 cm  Heel to Foot: 22.8 cm  Total: 164 cm          Left Lower Extremity Circumferential Measurements 8/31  Waist: cm  Hip: cm  Scrotum: cm  Ground/Upper Thigh: cm  Mid Thigh: cm  Knee: 47.7 cm  Upper Calf: 48.8 cm  Mid Calf: 39 cm  Ankle: 31.9 cm  Heel to Foot: 24.9 cm  Total: 192.3 cm     Right Lower Extremity Circumferential Measurements 8/31  Waist: cm  Hip: cm  Scrotum: cm  Ground/Upper Thigh: cm  Mid  Thigh: cm  Knee: 41.2 cm  Upper Calf: 44.9 cm  Mid Calf: 29.9 cm  Ankle: 25.7 cm  Heel to Foot: 22.4 cm  Total: 164.1 cm          Left Lower Extremity Circumferential Measurements 8/29  Waist: cm  Hip: cm  Scrotum: cm  Ground/Upper Thigh: cm  Mid Thigh: cm  Knee: 46.8 cm  Upper Calf: 47.9 cm  Mid Calf: 39.3 cm  Ankle: 33.5 cm  Heel to Foot: 25.2 cm  Total: 192.7 cm     Right Lower Extremity Circumferential Measurements 8/29  Waist: cm  Hip: cm  Scrotum: cm  Ground/Upper Thigh: cm  Mid Thigh: cm  Knee: 43.2 cm  Upper Calf: 44.7 cm  Mid Calf: 30 cm  Ankle: 26.7 cm  Heel to Foot: 22.6 cm  Total: 167.2 cm     Left Lower Extremity Circumferential Measurements 8/24  Waist: cm  Hip: cm  Scrotum: cm  Ground/Upper Thigh: cm  Mid Thigh: cm  Knee: 45.4 cm  Upper Calf: 48.5 cm  Mid Calf: 42.1 cm  Ankle: 32.3 cm  Heel to Foot: 24.8 cm  Total: 193.1 cm     Right Lower Extremity Circumferential Measurements 8/24  Waist: cm  Hip: cm  Scrotum: cm  Ground/Upper Thigh: cm  Mid Thigh: cm  Knee: 42.8 cm  Upper Calf: 46.7 cm  Mid Calf: 33.4 cm  Ankle: 26 cm  Heel to Foot: 21.7 cm  Total: 170.6 cm          Left Lower Extremity Circumferential Measurements 8/21  Waist: cm  Hip: cm  Scrotum: cm  Ground/Upper Thigh: cm  Mid Thigh: cm  Knee: 44.1 cm  Upper Calf: 47.2 cm  Mid Calf: 40.2 cm  Ankle: 32.1 cm  Heel to Foot: 25 cm  Total: 188.6 cm     Right Lower Extremity Circumferential Measurements 8/21  Waist: cm  Hip: cm  Scrotum: cm  Ground/Upper Thigh: cm  Mid Thigh: cm  Knee: 41.1 cm  Upper Calf: 43.8 cm  Mid Calf: 29.8 cm  Ankle: 26.2 cm  Heel to Foot: 22.5 cm  Total: 163.4 cm             Left Lower Extremity Circumferential Measurements 8/18  Waist: cm  Hip: cm  Scrotum: cm  Ground/Upper Thigh: cm  Mid Thigh: cm  Knee: 46.8 cm  Upper Calf: 47.3 cm  Mid Calf: 40.5 cm  Ankle: 31.2 cm  Heel to Foot: 25.6 cm  Total: 191.4 cm     Right Lower Extremity Circumferential Measurements 8/18  Waist: cm  Hip: cm  Scrotum: cm  Ground/Upper Thigh:  cm  Mid Thigh: cm  Knee: 43.6 cm  Upper Calf: 45.2 cm  Mid Calf: 30.3 cm  Ankle: 27.7 cm  Heel to Foot: 23.5 cm  Total: 170.3 cm          Left Lower Extremity Circumferential Measurements 8/10  Waist: cm  Hip: cm  Scrotum: cm  Ground/Upper Thigh: cm  Mid Thigh: cm  Knee: 46.1 cm  Upper Calf: 48.2 cm  Mid Calf: 38.1 cm  Ankle: 34.3 cm  Heel to Foot: 26 cm  Total: 192.7 cm     Right Lower Extremity Circumferential Measurements 8/10  Waist: cm  Hip: cm  Scrotum: cm  Ground/Upper Thigh: cm  Mid Thigh: cm  Knee: 42.2 cm  Upper Calf: 44.2 cm  Mid Calf: 28.3 cm  Ankle: 25.4 cm  Heel to Foot: 22.8 cm  Total: 162.9 cm          Left Lower Extremity Circumferential Measurements 8/8  Waist: cm  Hip: cm  Scrotum: cm  Ground/Upper Thigh: cm  Mid Thigh: cm  Knee: 46.6 cm  Upper Calf: 48.7 cm  Mid Calf: 43.1 cm  Ankle: 34.7 cm  Heel to Foot: 25.2 cm  Total: 198.3 cm     Right Lower Extremity Circumferential Measurements 8/8  Waist: cm  Hip: cm  Scrotum: cm  Ground/Upper Thigh: cm  Mid Thigh: cm  Knee: 43.3 cm  Upper Calf: 45.5 cm  Mid Calf: 31.4 cm  Ankle: 27.6 cm  Heel to Foot: 22.5 cm  Total: 170.3 cm          Left Lower Extremity Circumferential Measurements 7/25  Waist: cm  Hip: cm  Scrotum: cm  Ground/Upper Thigh: cm  Mid Thigh: cm  Knee: 46.4 cm  Upper Calf: 48 cm  Mid Calf: 38.5 cm  Ankle: 33.8 cm  Heel to Foot: 25.7 cm  Total: 192.4 cm     Right Lower Extremity Circumferential Measurements 7/25  Waist: cm  Hip: cm  Scrotum: cm  Ground/Upper Thigh: cm  Mid Thigh: cm  Knee: 42.5 cm  Upper Calf: 44.6 cm  Mid Calf: 29.4 cm  Ankle: 26.3 cm  Heel to Foot: 22.8 cm  Total: 165.6 cm          Left Lower Extremity Circumferential Measurements 7/18  Waist: cm  Hip: cm  Scrotum: cm  Ground/Upper Thigh: cm  Mid Thigh: cm  Knee: 46 cm  Upper Calf: 50.1 cm  Mid Calf: 42.3 cm  Ankle: 34.5 cm  Heel to Foot: 25.7 cm  Total: 198.6 cm     Right Lower Extremity Circumferential Measurements 7/18  Waist: cm  Hip: cm  Scrotum: cm  Ground/Upper  Thigh: cm  Mid Thigh: cm  Knee: 42.7 cm  Upper Calf: 44.7 cm  Mid Calf: 30.7 cm  Ankle: 27.5 cm  Heel to Foot: 23.6 cm  Total: 169.2 cm          Left Lower Extremity Circumferential Measurements 7/13  Waist: cm  Hip: cm  Scrotum: cm  Ground/Upper Thigh: cm  Mid Thigh: cm  Knee: 45.2 cm  Upper Calf: 50.7 cm  Mid Calf: 44.1 cm  Ankle: 35.2 cm  Heel to Foot: 25.6 cm  Total: 200.8 cm     Right Lower Extremity Circumferential Measurements 7/13  Waist: cm  Hip: cm  Scrotum: cm  Ground/Upper Thigh: cm  Mid Thigh: cm  Knee: 42.5 cm  Upper Calf: 45.4 cm  Mid Calf: 30.5 cm  Ankle: 28.4 cm  Heel to Foot: 22.8 cm  Total: 169.6 cm           Left Lower Extremity Circumferential Measurements EVAL  Waist: cm  Hip: cm  Scrotum: cm  Ground/Upper Thigh: cm  Mid Thigh: cm  Knee: 46.2 cm  Upper Calf: 54.2 cm  Mid Calf: 49.2 cm  Ankle: 36.4 cm  Heel to Foot: 24.9 cm  Total: 210.9 cm    Therapeutic Treatments and Modalities:     1. Self Care ADL Training (CPT 40271), Discussed current socks and control. Dorcas is doing excellent. Provided brand/sizing information to order more.    2. Manual Therapy (CPT 09389)    Therapeutic Treatment and Modalities Summary: MLD to L LE without trunk involvement. Focus on L LE and accompanying anastomoses.   The purpose of Manual Lymph Drainage (MLD) is to reduce lymph volume in the affected limb by increasing intake of lymphatic load into the lymphatic system, increasing the volume of transported lymph fluid, moving lymph fluid in superficial lymph vessels to collateral lymph collectors, anastomoses, or tissue channels, and increasing venous return. The goal of MLD is to re-route the lymph flow around blocked areas into more centrally located healthy lymph vessels, which drain into the venous system.      Time-based treatments/modalities:    Physical Therapy Timed Treatment Charges  Functional training, self care minutes (CPT 31673): 20 minutes  Manual therapy minutes (CPT 01370): 23  minutes      Assessment and Plan:   Assessment details:  Dorcas was able to reduce and sustain her L LE circumference while on vacation with alternating wraps and compression sock. At this point, she is doing wonderfully and will be DC'd to her home management. She will look into custom LE compression in the future.

## 2023-10-09 NOTE — OP THERAPY DISCHARGE SUMMARY
Outpatient Physical Therapy  DISCHARGE SUMMARY NOTE      [facility] Physical Therapy [address].  [address]  [place] [address]  Phone:  [phone]  Fax:  [phone]    Date of Visit: 10/09/2023    Patient: Dorcas Michael  YOB: 1955  MRN: [id number]     Referring Provider: [address] [address]   Referring Diagnosis Lymphedema, not elsewhere classified [I89.0]             Your patient is being discharged from Physical Therapy with the following comments:   Goals met    Comments:  Dorcas was seen for 15 visits of Complete Decongestive Therapy and reduced her L LE by 41cm. She has sustained her recent reductions and understands to continue with ongoing compression and self MLD/pump use. She has met her goals and will be DC'd.     Thank you for the referral,     Kristie Escalera, PT, DPT, CLT    Date: 10/9/2023

## 2023-10-11 ENCOUNTER — APPOINTMENT (OUTPATIENT)
Dept: PHYSICAL THERAPY | Facility: REHABILITATION | Age: 68
End: 2023-10-11
Payer: MEDICARE

## 2023-10-16 ENCOUNTER — APPOINTMENT (OUTPATIENT)
Dept: PHYSICAL THERAPY | Facility: REHABILITATION | Age: 68
End: 2023-10-16
Payer: MEDICARE

## 2023-10-18 ENCOUNTER — APPOINTMENT (OUTPATIENT)
Dept: PHYSICAL THERAPY | Facility: REHABILITATION | Age: 68
End: 2023-10-18
Payer: MEDICARE

## 2023-11-01 ENCOUNTER — OFFICE VISIT (OUTPATIENT)
Dept: MEDICAL GROUP | Facility: IMAGING CENTER | Age: 68
End: 2023-11-01
Payer: MEDICARE

## 2023-11-01 VITALS
HEIGHT: 69 IN | BODY MASS INDEX: 37.47 KG/M2 | RESPIRATION RATE: 14 BRPM | HEART RATE: 60 BPM | TEMPERATURE: 97 F | OXYGEN SATURATION: 100 % | WEIGHT: 253 LBS | DIASTOLIC BLOOD PRESSURE: 82 MMHG | SYSTOLIC BLOOD PRESSURE: 132 MMHG

## 2023-11-01 DIAGNOSIS — R73.01 IMPAIRED FASTING GLUCOSE: ICD-10-CM

## 2023-11-01 DIAGNOSIS — I89.0 LYMPHEDEMA OF BOTH LOWER EXTREMITIES: ICD-10-CM

## 2023-11-01 DIAGNOSIS — G89.4 CHRONIC PAIN SYNDROME: ICD-10-CM

## 2023-11-01 DIAGNOSIS — E66.9 OBESITY (BMI 30-39.9): ICD-10-CM

## 2023-11-01 DIAGNOSIS — E78.00 HYPERCHOLESTEROLEMIA: ICD-10-CM

## 2023-11-01 DIAGNOSIS — Z00.00 WELLNESS EXAMINATION: ICD-10-CM

## 2023-11-01 DIAGNOSIS — Z79.891 CHRONIC USE OF OPIATE FOR THERAPEUTIC PURPOSE: ICD-10-CM

## 2023-11-01 PROCEDURE — 3075F SYST BP GE 130 - 139MM HG: CPT

## 2023-11-01 PROCEDURE — 99214 OFFICE O/P EST MOD 30 MIN: CPT

## 2023-11-01 PROCEDURE — 3079F DIAST BP 80-89 MM HG: CPT

## 2023-11-01 RX ORDER — OXYCODONE HYDROCHLORIDE AND ACETAMINOPHEN 5; 325 MG/1; MG/1
1 TABLET ORAL
Qty: 30 TABLET | Refills: 0 | Status: SHIPPED | OUTPATIENT
Start: 2023-11-01 | End: 2023-11-15 | Stop reason: SDUPTHER

## 2023-11-01 ASSESSMENT — FIBROSIS 4 INDEX: FIB4 SCORE: 1.56

## 2023-11-01 NOTE — PROGRESS NOTES
Subjective:     CC:   Chief Complaint   Patient presents with    Medication Refill     percocet       HPI:   Dorcas presents today to discuss:    Lymphedema of both lower extremity  Chronic pain syndrome  Chronic condition for ~20 years.    She is seeing PT 2x/week and lymphedema clinic and acupuncture for this.   She will start wearing compression stockings prescribed by PT.   Patient presents today to request refill on Percocet.  Denies constipation.   She denies fevers, chills, fatigue, malaise.  Denies lower extremity redness, warmth, skin discoloration, numbness, tingling, weakness.       Past Medical History:   Diagnosis Date    Arthritis     both hands    Lymphedema 1995    bilateral legs    Pain     left knee 4/10    Pain     hands, feet    Pain     left hand     Family History   Problem Relation Age of Onset    Heart Disease Other     Other Mother         MS    Thyroid Daughter     GI Disease Other         Crohns    Thyroid Sister      Past Surgical History:   Procedure Laterality Date    VENTRAL HERNIA REPAIR LAPAROSCOPIC  2/16/2021    Procedure: REPAIR, HERNIA, VENTRAL, LAPAROSCOPIC - UMBILICAL;  Surgeon: Lilliana Mejia M.D.;  Location: SURGERY SAME DAY [facility];  Service: General    HERNIA REPAIR  02/16/2021    Umbilical     FINGER ARTHROPLASTY Left 8/22/2017    Procedure: FINGER ARTHROPLASTY - CARPAL METACARPAL;  Surgeon: Magnus Anderson M.D.;  Location: [facility];  Service:     TENDON TRANSFER Left 8/22/2017    Procedure: TENDON TRANSFER - FLEXI CARPI RADIALIS;  Surgeon: Magnus Anderson M.D.;  Location: [facility];  Service:     CAPSULOTOMY  8/22/2017    Procedure: CAPSULOTOMY - METACARPAL PHALANGEAL JOINT CAPSULODESIS;  Surgeon: Magnus Anderson M.D.;  Location: [facility];  Service:     FINGER ARTHROPLASTY Right 9/20/2016    Procedure: FINGER ARTHROPLASTY - CARPAL METACARPAL;  Surgeon: Magnus Anderson M.D.;  Location: [facility];  Service:      TENDON TRANSFER Right 9/20/2016    Procedure: TENDON TRANSFER - FLEXI CARPI RADIALIS;  Surgeon: Magnus Anderson M.D.;  Location: SURGERY [facility];  Service:     PIN INSERTION Right 9/20/2016    Procedure: PIN INSERTION - METACARPAL PHALANGEAL JOINT;  Surgeon: Magnus Anderson M.D.;  Location: SURGERY [facility];  Service:     KNEE MANIPULATION  3/1/2010    Performed by OSGOOD, PATRICK J at SURGERY [facility]    KNEE ARTHROPLASTY TOTAL  11/3/2009    Performed by OSGOOD, PATRICK J at SURGERY [facility]    ACHILLES TENDON REP Right 5/2005    right    KNEE ARTHROPLASTY TOTAL Right 12/2003    right    KNEE ARTHROSCOPY Right 5/1998    right    RECTOCELE REPAIR  6/1990    repair cystocele    KNEE ARTHROTOMY Left 1973    left lateral meniscectomy    KNEE ARTHROTOMY Right 1971    right lateral meniscectomy     Social History     Tobacco Use    Smoking status: Never    Smokeless tobacco: Never   Vaping Use    Vaping Use: Never used   Substance Use Topics    Alcohol use: Yes     Alcohol/week: 0.6 - 1.2 oz     Types: 1 - 2 Glasses of wine per week     Comment: 1-2 per day    Drug use: No     Social History     Social History Narrative    Not on file     Current Outpatient Medications Ordered in Epic   Medication Sig Dispense Refill    oxyCODONE-acetaminophen (PERCOCET) 5-325 MG Tab Take 1 Tablet by mouth 1 time a day as needed for Severe Pain for up to 30 days. 30 Tablet 0    celecoxib (CELEBREX) 200 MG Cap TAKE 1 CAPSULE BY MOUTH EVERY DAY 90 Capsule 0    furosemide (LASIX) 20 MG Tab TAKE 1 TABLET BY MOUTH AS NEEDED (LOWER LEG EDEMA). 30 Tablet 1    Probiotic Product (PROBIOTIC BLEND PO)       nystatin (MYCOSTATIN) 890339 UNIT/GM Cream topical cream APPLY 1 GM TO AFFECTED AREA(S) 2 TIMES A DAY. 90 g 2    B Complex Vitamins (VITAMIN B COMPLEX PO) every day.      Cholecalciferol (VITAMIN D3) 5000 UNITS Cap Take 1 Capsule by mouth every day.      FIBER PO Take  by mouth every day.      diphenhydrAMINE  "(BENADRYL) 25 MG Tab Take 1 Tablet by mouth every 6 hours as needed for Sleep.       No current Epic-ordered facility-administered medications on file.     Patient has no known allergies.    ROS: see hpi  Gen: no fevers/chills  Pulm: no sob, no cough  CV: no chest pain, no palpitations, no edema  GI: no nausea/vomiting, no diarrhea  Skin: no rash    Objective:   Exam:  /82 (BP Location: Left arm, Patient Position: Sitting, BP Cuff Size: Adult)   Pulse 60   Temp 36.1 °C (97 °F) (Temporal)   Resp 14   Ht 1.753 m (5' 9\")   Wt 115 kg (253 lb)   SpO2 100%   BMI 37.36 kg/m²    Body mass index is 37.36 kg/m².    Gen: Alert and oriented, No apparent distress.  HEENT: Head atraumatic, normocephalic. Pupils equal and round.  Neck: Neck is supple without lymphadenopathy.   Lungs: Normal effort, CTA bilaterally, no wheezes, rhonchi, or rales  CV: Regular rate and rhythm. No murmurs, rubs, or gallops.  ABD: +BS. Non-tender, non-distended. No rebound, rigidity, or guarding.  Ext: No clubbing, cyanosis, BLE lymphadema.    Assessment & Plan:     68 y.o. female with the following -     1. Lymphedema of both lower extremities  2. Chronic use of opiate for therapeutic purpose  3. Chronic pain syndrome  Chronic, stable condition on Percocet as needed for severe pain.  Med refill sent to pharmacy.  Medication administration and side effects discussed.  Risk of dependence discussed.  Recommend to take stool softener to prevent constipation.  Increase fluid intake.  Continue with PT and acupuncture as scheduled.  Obtained and reviewed patient utilization report from [facility] pharmacy database on 11/1/2023 7:45 PM  prior to writing prescription for controlled substance II, III or IV per [place] bill . Based on assessment of the report, the prescription is medically necessary.      - oxyCODONE-acetaminophen (PERCOCET) 5-325 MG Tab; Take 1 Tablet by mouth 1 time a day as needed for Severe Pain for up to 30 days.  " Dispense: 30 Tablet; Refill: 0  - CBC WITH DIFFERENTIAL; Future  - Comp Metabolic Panel; Future  - TSH WITH REFLEX TO FT4; Future  - VITAMIN D,25 HYDROXY (DEFICIENCY); Future  - HEMOGLOBIN A1C; Future    4. Wellness examination  Due for annual in 3 months, will order labs.    - CBC WITH DIFFERENTIAL; Future  - Comp Metabolic Panel; Future  - Lipid Profile; Future  - TSH WITH REFLEX TO FT4; Future  - VITAMIN D,25 HYDROXY (DEFICIENCY); Future  - HEMOGLOBIN A1C; Future    5. Obesity (BMI 30-39.9)  Discussed lifestyle and dietary changes such as low-carb diet, high in vegetables and fresh fruits.  Encouraged to increase water intake.  Regular physical exercise at least 30 minutes/day 5 days a week. Weight reduction if applicable (aim for 5% to 10% body weight increments). Patient is at an increased risk for serious conditions such as heart disease, type 2 diabetes, MARIEL, certain types of cancers, gallbladder disease, and circulation problems. Will order labs to rule out.     - CBC WITH DIFFERENTIAL; Future  - Comp Metabolic Panel; Future  - Lipid Profile; Future  - TSH WITH REFLEX TO FT4; Future  - VITAMIN D,25 HYDROXY (DEFICIENCY); Future  - HEMOGLOBIN A1C; Future    6. Hypercholesterolemia    - Comp Metabolic Panel; Future  - Lipid Profile; Future    7. Impaired fasting glucose    - HEMOGLOBIN A1C; Future    Medical Decision Making/Course:  In the course of preparing for this visit with review of the pertinent past medical history, recent and past clinic visits, current medications, and performing chart, immunization, medical history and medication reconciliation, and in the further course of obtaining the current history pertinent to the clinic visit today, performing an exam and evaluation, ordering and independently evaluating labs, tests, and/or procedures, prescribing any recommended new medications as noted above, providing any pertinent counseling and education and recommending further coordination of care. This  was discussed with patient in a shared-decision making conversation, and they understand and agreed with plan of care.     Return in about 3 months (around 2/1/2024), or if symptoms worsen or fail to improve, for med check, f/u labs.    SHUKRI Sanchez.   [facility]    Please note that this dictation was created using voice recognition software. I have made every reasonable attempt to correct obvious errors, but I expect that there are errors of grammar and possibly content that I did not discover before finalizing the note.

## 2023-11-15 DIAGNOSIS — I89.0 LYMPHEDEMA OF BOTH LOWER EXTREMITIES: ICD-10-CM

## 2023-11-15 DIAGNOSIS — G89.4 CHRONIC PAIN SYNDROME: ICD-10-CM

## 2023-11-15 DIAGNOSIS — Z79.891 CHRONIC USE OF OPIATE FOR THERAPEUTIC PURPOSE: ICD-10-CM

## 2023-11-15 NOTE — TELEPHONE ENCOUNTER
Received request via: Patient    Was the patient seen in the last year in this department? Yes    Does the patient have an active prescription (recently filled or refills available) for medication(s) requested? No    Does the patient have group home Plus and need 100 day supply (blood pressure, diabetes and cholesterol meds only)? Medication is not for cholesterol, blood pressure or diabetes    Patient comment: My insurance wanted me to get a 7 day supply to establish with them since they had never covered this before so I did that.  Now I need the prescription sent to [facility] for the original 30 day supply and they will now cover it.  Thank you.

## 2023-11-16 RX ORDER — OXYCODONE HYDROCHLORIDE AND ACETAMINOPHEN 5; 325 MG/1; MG/1
1 TABLET ORAL
Qty: 30 TABLET | Refills: 0 | Status: SHIPPED | OUTPATIENT
Start: 2023-11-16 | End: 2023-12-16

## 2023-12-16 DIAGNOSIS — M18.0 PRIMARY ARTHROSIS OF FIRST CARPOMETACARPAL JOINTS, BILATERAL: ICD-10-CM

## 2023-12-18 NOTE — TELEPHONE ENCOUNTER
Received request via: Pharmacy    Was the patient seen in the last year in this department? Yes    Does the patient have an active prescription (recently filled or refills available) for medication(s) requested? No    Does the patient have custodial Plus and need 100 day supply (blood pressure, diabetes and cholesterol meds only)? Medication is not for cholesterol, blood pressure or diabetes

## 2023-12-21 RX ORDER — CELECOXIB 200 MG/1
200 CAPSULE ORAL
Qty: 90 CAPSULE | Refills: 0 | Status: SHIPPED | OUTPATIENT
Start: 2023-12-21 | End: 2024-03-18

## 2024-01-29 ENCOUNTER — HOSPITAL ENCOUNTER (OUTPATIENT)
Dept: LAB | Facility: MEDICAL CENTER | Age: 69
End: 2024-01-29
Payer: MEDICARE

## 2024-01-29 DIAGNOSIS — E78.00 HYPERCHOLESTEROLEMIA: ICD-10-CM

## 2024-01-29 DIAGNOSIS — R73.01 IMPAIRED FASTING GLUCOSE: ICD-10-CM

## 2024-01-29 DIAGNOSIS — E66.9 OBESITY (BMI 30-39.9): ICD-10-CM

## 2024-01-29 DIAGNOSIS — Z00.00 WELLNESS EXAMINATION: ICD-10-CM

## 2024-01-29 DIAGNOSIS — I89.0 LYMPHEDEMA OF BOTH LOWER EXTREMITIES: ICD-10-CM

## 2024-01-29 LAB
25(OH)D3 SERPL-MCNC: 87 NG/ML (ref 30–100)
ALBUMIN SERPL BCP-MCNC: 4 G/DL (ref 3.2–4.9)
ALBUMIN/GLOB SERPL: 1.5 G/DL
ALP SERPL-CCNC: 63 U/L (ref 30–99)
ALT SERPL-CCNC: 18 U/L (ref 2–50)
ANION GAP SERPL CALC-SCNC: 13 MMOL/L (ref 7–16)
AST SERPL-CCNC: 24 U/L (ref 12–45)
BASOPHILS # BLD AUTO: 1 % (ref 0–1.8)
BASOPHILS # BLD: 0.06 K/UL (ref 0–0.12)
BILIRUB SERPL-MCNC: 1 MG/DL (ref 0.1–1.5)
BUN SERPL-MCNC: 15 MG/DL (ref 8–22)
CALCIUM ALBUM COR SERPL-MCNC: 9.6 MG/DL (ref 8.5–10.5)
CALCIUM SERPL-MCNC: 9.6 MG/DL (ref 8.5–10.5)
CHLORIDE SERPL-SCNC: 102 MMOL/L (ref 96–112)
CHOLEST SERPL-MCNC: 176 MG/DL (ref 100–199)
CO2 SERPL-SCNC: 23 MMOL/L (ref 20–33)
CREAT SERPL-MCNC: 0.56 MG/DL (ref 0.5–1.4)
EOSINOPHIL # BLD AUTO: 0.12 K/UL (ref 0–0.51)
EOSINOPHIL NFR BLD: 2.1 % (ref 0–6.9)
ERYTHROCYTE [DISTWIDTH] IN BLOOD BY AUTOMATED COUNT: 48.3 FL (ref 35.9–50)
EST. AVERAGE GLUCOSE BLD GHB EST-MCNC: 111 MG/DL
GFR SERPLBLD CREATININE-BSD FMLA CKD-EPI: 98 ML/MIN/1.73 M 2
GLOBULIN SER CALC-MCNC: 2.7 G/DL (ref 1.9–3.5)
GLUCOSE SERPL-MCNC: 101 MG/DL (ref 65–99)
HBA1C MFR BLD: 5.5 % (ref 4–5.6)
HCT VFR BLD AUTO: 41.7 % (ref 37–47)
HDLC SERPL-MCNC: 76 MG/DL
HGB BLD-MCNC: 13.5 G/DL (ref 12–16)
IMM GRANULOCYTES # BLD AUTO: 0.03 K/UL (ref 0–0.11)
IMM GRANULOCYTES NFR BLD AUTO: 0.5 % (ref 0–0.9)
LDLC SERPL CALC-MCNC: 92 MG/DL
LYMPHOCYTES # BLD AUTO: 1.41 K/UL (ref 1–4.8)
LYMPHOCYTES NFR BLD: 24.4 % (ref 22–41)
MCH RBC QN AUTO: 31 PG (ref 27–33)
MCHC RBC AUTO-ENTMCNC: 32.4 G/DL (ref 32.2–35.5)
MCV RBC AUTO: 95.9 FL (ref 81.4–97.8)
MONOCYTES # BLD AUTO: 0.54 K/UL (ref 0–0.85)
MONOCYTES NFR BLD AUTO: 9.4 % (ref 0–13.4)
NEUTROPHILS # BLD AUTO: 3.61 K/UL (ref 1.82–7.42)
NEUTROPHILS NFR BLD: 62.6 % (ref 44–72)
NRBC # BLD AUTO: 0 K/UL
NRBC BLD-RTO: 0 /100 WBC (ref 0–0.2)
PLATELET # BLD AUTO: 230 K/UL (ref 164–446)
PMV BLD AUTO: 10.1 FL (ref 9–12.9)
POTASSIUM SERPL-SCNC: 4.2 MMOL/L (ref 3.6–5.5)
PROT SERPL-MCNC: 6.7 G/DL (ref 6–8.2)
RBC # BLD AUTO: 4.35 M/UL (ref 4.2–5.4)
SODIUM SERPL-SCNC: 138 MMOL/L (ref 135–145)
TRIGL SERPL-MCNC: 42 MG/DL (ref 0–149)
TSH SERPL DL<=0.005 MIU/L-ACNC: 3.18 UIU/ML (ref 0.38–5.33)
WBC # BLD AUTO: 5.8 K/UL (ref 4.8–10.8)

## 2024-01-29 PROCEDURE — 84443 ASSAY THYROID STIM HORMONE: CPT

## 2024-01-29 PROCEDURE — 85025 COMPLETE CBC W/AUTO DIFF WBC: CPT

## 2024-01-29 PROCEDURE — 83036 HEMOGLOBIN GLYCOSYLATED A1C: CPT

## 2024-01-29 PROCEDURE — 36415 COLL VENOUS BLD VENIPUNCTURE: CPT

## 2024-01-29 PROCEDURE — 80053 COMPREHEN METABOLIC PANEL: CPT

## 2024-01-29 PROCEDURE — 82306 VITAMIN D 25 HYDROXY: CPT

## 2024-01-29 PROCEDURE — 80061 LIPID PANEL: CPT

## 2024-01-29 SDOH — ECONOMIC STABILITY: FOOD INSECURITY: WITHIN THE PAST 12 MONTHS, THE FOOD YOU BOUGHT JUST DIDN'T LAST AND YOU DIDN'T HAVE MONEY TO GET MORE.: NEVER TRUE

## 2024-01-29 SDOH — ECONOMIC STABILITY: FOOD INSECURITY: WITHIN THE PAST 12 MONTHS, YOU WORRIED THAT YOUR FOOD WOULD RUN OUT BEFORE YOU GOT MONEY TO BUY MORE.: NEVER TRUE

## 2024-01-29 SDOH — ECONOMIC STABILITY: INCOME INSECURITY: IN THE LAST 12 MONTHS, WAS THERE A TIME WHEN YOU WERE NOT ABLE TO PAY THE MORTGAGE OR RENT ON TIME?: NO

## 2024-01-29 SDOH — HEALTH STABILITY: PHYSICAL HEALTH: ON AVERAGE, HOW MANY MINUTES DO YOU ENGAGE IN EXERCISE AT THIS LEVEL?: 20 MIN

## 2024-01-29 SDOH — ECONOMIC STABILITY: INCOME INSECURITY: HOW HARD IS IT FOR YOU TO PAY FOR THE VERY BASICS LIKE FOOD, HOUSING, MEDICAL CARE, AND HEATING?: NOT HARD AT ALL

## 2024-01-29 SDOH — HEALTH STABILITY: PHYSICAL HEALTH: ON AVERAGE, HOW MANY DAYS PER WEEK DO YOU ENGAGE IN MODERATE TO STRENUOUS EXERCISE (LIKE A BRISK WALK)?: 5 DAYS

## 2024-01-29 SDOH — ECONOMIC STABILITY: HOUSING INSECURITY: IN THE LAST 12 MONTHS, HOW MANY PLACES HAVE YOU LIVED?: 1

## 2024-01-29 ASSESSMENT — LIFESTYLE VARIABLES
HOW MANY STANDARD DRINKS CONTAINING ALCOHOL DO YOU HAVE ON A TYPICAL DAY: 1 OR 2
HOW OFTEN DO YOU HAVE A DRINK CONTAINING ALCOHOL: 2-3 TIMES A WEEK
SKIP TO QUESTIONS 9-10: 0
HOW OFTEN DO YOU HAVE SIX OR MORE DRINKS ON ONE OCCASION: LESS THAN MONTHLY
AUDIT-C TOTAL SCORE: 4

## 2024-01-29 ASSESSMENT — SOCIAL DETERMINANTS OF HEALTH (SDOH)
DO YOU BELONG TO ANY CLUBS OR ORGANIZATIONS SUCH AS CHURCH GROUPS UNIONS, FRATERNAL OR ATHLETIC GROUPS, OR SCHOOL GROUPS?: YES
IN A TYPICAL WEEK, HOW MANY TIMES DO YOU TALK ON THE PHONE WITH FAMILY, FRIENDS, OR NEIGHBORS?: MORE THAN THREE TIMES A WEEK
DO YOU BELONG TO ANY CLUBS OR ORGANIZATIONS SUCH AS CHURCH GROUPS UNIONS, FRATERNAL OR ATHLETIC GROUPS, OR SCHOOL GROUPS?: YES
HOW OFTEN DO YOU GET TOGETHER WITH FRIENDS OR RELATIVES?: MORE THAN THREE TIMES A WEEK
HOW OFTEN DO YOU ATTEND CHURCH OR RELIGIOUS SERVICES?: NEVER
HOW OFTEN DO YOU GET TOGETHER WITH FRIENDS OR RELATIVES?: MORE THAN THREE TIMES A WEEK
HOW OFTEN DO YOU HAVE A DRINK CONTAINING ALCOHOL: 2-3 TIMES A WEEK
WITHIN THE PAST 12 MONTHS, YOU WORRIED THAT YOUR FOOD WOULD RUN OUT BEFORE YOU GOT THE MONEY TO BUY MORE: NEVER TRUE
HOW OFTEN DO YOU HAVE SIX OR MORE DRINKS ON ONE OCCASION: LESS THAN MONTHLY
HOW OFTEN DO YOU ATTENT MEETINGS OF THE CLUB OR ORGANIZATION YOU BELONG TO?: MORE THAN 4 TIMES PER YEAR
HOW HARD IS IT FOR YOU TO PAY FOR THE VERY BASICS LIKE FOOD, HOUSING, MEDICAL CARE, AND HEATING?: NOT HARD AT ALL
HOW OFTEN DO YOU ATTEND CHURCH OR RELIGIOUS SERVICES?: NEVER
HOW MANY DRINKS CONTAINING ALCOHOL DO YOU HAVE ON A TYPICAL DAY WHEN YOU ARE DRINKING: 1 OR 2
HOW OFTEN DO YOU ATTENT MEETINGS OF THE CLUB OR ORGANIZATION YOU BELONG TO?: MORE THAN 4 TIMES PER YEAR
IN A TYPICAL WEEK, HOW MANY TIMES DO YOU TALK ON THE PHONE WITH FAMILY, FRIENDS, OR NEIGHBORS?: MORE THAN THREE TIMES A WEEK

## 2024-02-01 ENCOUNTER — OFFICE VISIT (OUTPATIENT)
Dept: MEDICAL GROUP | Facility: IMAGING CENTER | Age: 69
End: 2024-02-01
Payer: MEDICARE

## 2024-02-01 ENCOUNTER — HOSPITAL ENCOUNTER (OUTPATIENT)
Facility: MEDICAL CENTER | Age: 69
End: 2024-02-01
Payer: MEDICARE

## 2024-02-01 VITALS
OXYGEN SATURATION: 96 % | TEMPERATURE: 98.6 F | SYSTOLIC BLOOD PRESSURE: 114 MMHG | RESPIRATION RATE: 14 BRPM | HEIGHT: 69 IN | BODY MASS INDEX: 37.15 KG/M2 | HEART RATE: 62 BPM | DIASTOLIC BLOOD PRESSURE: 80 MMHG | WEIGHT: 250.8 LBS

## 2024-02-01 DIAGNOSIS — R82.998 LEUKOCYTES IN URINE: ICD-10-CM

## 2024-02-01 DIAGNOSIS — Z79.891 CHRONIC USE OF OPIATE DRUG FOR THERAPEUTIC PURPOSE: ICD-10-CM

## 2024-02-01 DIAGNOSIS — R39.9 UTI SYMPTOMS: ICD-10-CM

## 2024-02-01 DIAGNOSIS — I89.0 LYMPHEDEMA OF LEFT LOWER EXTREMITY: ICD-10-CM

## 2024-02-01 DIAGNOSIS — G89.4 CHRONIC PAIN SYNDROME: ICD-10-CM

## 2024-02-01 LAB
APPEARANCE UR: NORMAL
BILIRUB UR STRIP-MCNC: NORMAL MG/DL
COLOR UR AUTO: NORMAL
GLUCOSE UR STRIP.AUTO-MCNC: NORMAL MG/DL
KETONES UR STRIP.AUTO-MCNC: NORMAL MG/DL
LEUKOCYTE ESTERASE UR QL STRIP.AUTO: NORMAL
NITRITE UR QL STRIP.AUTO: NORMAL
PH UR STRIP.AUTO: 6 [PH] (ref 5–8)
PROT UR QL STRIP: NORMAL MG/DL
RBC UR QL AUTO: NORMAL
SP GR UR STRIP.AUTO: 1.01
UROBILINOGEN UR STRIP-MCNC: 0.2 MG/DL

## 2024-02-01 PROCEDURE — 99214 OFFICE O/P EST MOD 30 MIN: CPT

## 2024-02-01 PROCEDURE — 87077 CULTURE AEROBIC IDENTIFY: CPT

## 2024-02-01 PROCEDURE — 87086 URINE CULTURE/COLONY COUNT: CPT

## 2024-02-01 PROCEDURE — 3079F DIAST BP 80-89 MM HG: CPT

## 2024-02-01 PROCEDURE — 3074F SYST BP LT 130 MM HG: CPT

## 2024-02-01 PROCEDURE — 87186 SC STD MICRODIL/AGAR DIL: CPT

## 2024-02-01 PROCEDURE — 81002 URINALYSIS NONAUTO W/O SCOPE: CPT

## 2024-02-01 RX ORDER — OXYCODONE HYDROCHLORIDE AND ACETAMINOPHEN 5; 325 MG/1; MG/1
1 TABLET ORAL PRN
Qty: 30 TABLET | Refills: 0 | Status: SHIPPED | OUTPATIENT
Start: 2024-02-01 | End: 2024-03-02

## 2024-02-01 RX ORDER — NITROFURANTOIN 25; 75 MG/1; MG/1
100 CAPSULE ORAL 2 TIMES DAILY
Qty: 10 CAPSULE | Refills: 0 | Status: SHIPPED | OUTPATIENT
Start: 2024-02-01 | End: 2024-02-06

## 2024-02-01 ASSESSMENT — FIBROSIS 4 INDEX: FIB4 SCORE: 1.697056274847714058

## 2024-02-01 ASSESSMENT — PATIENT HEALTH QUESTIONNAIRE - PHQ9: CLINICAL INTERPRETATION OF PHQ2 SCORE: 0

## 2024-02-01 NOTE — PROGRESS NOTES
Subjective:     Chief Complaint   Patient presents with    UTI     Frequently x 1 week    Medication Refill     3 mos f/v percocet     HPI:   Dorcas presents today to discuss:    Lymphedema of both lower extremity  Chronic pain syndrome  Chronic use of opiate for therapeutic purpose  Chronic and ongoing condition for ~20 years.   She is seeing PT 2x/week and lymphedema clinic and acupuncture for this.   She wears compression stockings prescribed by PT.   Patient presents today to request refill on Percocet. Patient takes percocet a few times a week depending on her pain level. She does uses it sparingly.   Denies constipation.   She denies fevers, chills, fatigue, malaise.  Denies lower extremity redness, warmth, skin discoloration, numbness, tingling, weakness.    UTI symptoms  Patient reports developing urinary urgency and frequency 1 week go. She's had UTI in the past and her symptoms are presenting similarly.   She denies fevers, nausea, vomiting, hematuria, flank pain.      Past Medical History:   Diagnosis Date    Arthritis     both hands    Lymphedema 1995    bilateral legs    Pain     left knee 4/10    Pain     hands, feet    Pain     left hand     Family History   Problem Relation Age of Onset    Heart Disease Other     Other Mother         MS    Thyroid Daughter     GI Disease Other         Crohns    Thyroid Sister      Past Surgical History:   Procedure Laterality Date    VENTRAL HERNIA REPAIR LAPAROSCOPIC  2/16/2021    Procedure: REPAIR, HERNIA, VENTRAL, LAPAROSCOPIC - UMBILICAL;  Surgeon: Lilliana Mejia M.D.;  Location: SURGERY SAME DAY [facility];  Service: General    HERNIA REPAIR  02/16/2021    Umbilical     FINGER ARTHROPLASTY Left 8/22/2017    Procedure: FINGER ARTHROPLASTY - CARPAL METACARPAL;  Surgeon: Magnus Anderson M.D.;  Location: SURGERY [facility];  Service:     TENDON TRANSFER Left 8/22/2017    Procedure: TENDON TRANSFER - FLEXI CARPI RADIALIS;  Surgeon: Magnus Anderson M.D.;   Location: [facility];  Service:     CAPSULOTOMY  8/22/2017    Procedure: CAPSULOTOMY - METACARPAL PHALANGEAL JOINT CAPSULODESIS;  Surgeon: Magnus Anderson M.D.;  Location: SURGERY [facility];  Service:     FINGER ARTHROPLASTY Right 9/20/2016    Procedure: FINGER ARTHROPLASTY - CARPAL METACARPAL;  Surgeon: Magnus Anderson M.D.;  Location: SURGERY [facility];  Service:     TENDON TRANSFER Right 9/20/2016    Procedure: TENDON TRANSFER - FLEXI CARPI RADIALIS;  Surgeon: Magnus Anderson M.D.;  Location: SURGERY [facility];  Service:     PIN INSERTION Right 9/20/2016    Procedure: PIN INSERTION - METACARPAL PHALANGEAL JOINT;  Surgeon: Magnus Anderson M.D.;  Location: [facility];  Service:     KNEE MANIPULATION  3/1/2010    Performed by OSGOOD, PATRICK J at [facility]    KNEE ARTHROPLASTY TOTAL  11/3/2009    Performed by OSGOOD, PATRICK J at [facility]    ACHILLES TENDON REP Right 5/2005    right    KNEE ARTHROPLASTY TOTAL Right 12/2003    right    KNEE ARTHROSCOPY Right 5/1998    right    RECTOCELE REPAIR  6/1990    repair cystocele    KNEE ARTHROTOMY Left 1973    left lateral meniscectomy    KNEE ARTHROTOMY Right 1971    right lateral meniscectomy     Social History     Tobacco Use    Smoking status: Never    Smokeless tobacco: Never   Vaping Use    Vaping Use: Never used   Substance Use Topics    Alcohol use: Yes     Alcohol/week: 0.6 - 1.2 oz     Types: 1 - 2 Glasses of wine per week     Comment: 1-2 per day    Drug use: No     Social History     Social History Narrative    Not on file     Current Outpatient Medications Ordered in Epic   Medication Sig Dispense Refill    oxyCODONE-acetaminophen (PERCOCET) 5-325 MG Tab Take 1 Tablet by mouth as needed for Severe Pain for up to 30 days. 30 Tablet 0    nitrofurantoin (MACROBID) 100 MG Cap Take 1 Capsule by mouth 2 times a day for 5 days. 10 Capsule 0    celecoxib (CELEBREX) 200 MG Cap TAKE 1  "CAPSULE BY MOUTH EVERY DAY 90 Capsule 0    furosemide (LASIX) 20 MG Tab TAKE 1 TABLET BY MOUTH AS NEEDED (LOWER LEG EDEMA). 30 Tablet 1    Probiotic Product (PROBIOTIC BLEND PO)       nystatin (MYCOSTATIN) 209114 UNIT/GM Cream topical cream APPLY 1 GM TO AFFECTED AREA(S) 2 TIMES A DAY. 90 g 2    B Complex Vitamins (VITAMIN B COMPLEX PO) every day.      Cholecalciferol (VITAMIN D3) 5000 UNITS Cap Take 1 Capsule by mouth every day.      FIBER PO Take  by mouth every day.      diphenhydrAMINE (BENADRYL) 25 MG Tab Take 1 Tablet by mouth every 6 hours as needed for Sleep.       No current Epic-ordered facility-administered medications on file.     Patient has no known allergies.    ROS: see hpi  Gen: no fevers/chills  Pulm: no sob, no cough  CV: no chest pain, no palpitations, no edema  GI: no nausea/vomiting, no diarrhea  Skin: no rash    Objective:   Exam:  /80 (BP Location: Left arm, Patient Position: Sitting, BP Cuff Size: Adult)   Pulse 62   Temp 37 °C (98.6 °F) (Temporal)   Resp 14   Ht 1.753 m (5' 9\")   Wt 114 kg (250 lb 12.8 oz)   SpO2 96%   BMI 37.04 kg/m²    Body mass index is 37.04 kg/m².    Gen: Alert and oriented, No apparent distress.  HEENT: Head atraumatic, normocephalic. Pupils equal and round.  Neck: Neck is supple without lymphadenopathy.   Lungs: Normal effort, CTA bilaterally, no wheezes, rhonchi, or rales  CV: Regular rate and rhythm. No murmurs, rubs, or gallops.  ABD: +BS. Non-tender, non-distended. No rebound, rigidity, or guarding.  Ext: No clubbing, cyanosis, edema.    Assessment & Plan:     68 y.o. female with the following -     1. Lymphedema of left lower extremity  2. Chronic pain syndrome  3. Chronic use of opiate for therapeutic purpose  Chronic, stable condition on Percocet as needed for severe pain.  Med refill sent to pharmacy.  Med administration and side effects discussed.  Risk of dependence discussed. Recommend to take stool softener to prevent constipation. Increase " fluid intake.   Continue with supportive care.  Obtained and reviewed patient utilization report from [facility] pharmacy database on 2/1/2024 4:20 PM  prior to writing prescription for controlled substance II, III or IV per [place] bill . Based on assessment of the report, the prescription is medically necessary.     - oxyCODONE-acetaminophen (PERCOCET) 5-325 MG Tab; Take 1 Tablet by mouth as needed for Severe Pain for up to 30 days.  Dispense: 30 Tablet; Refill: 0      4. UTI symptoms  5. Leukocytes in urine  Acute on chronic condition.  Abnormal analysis will send for urine culture.  Will empirically treat with Macrobid.  Medication administration and side effects discussed.  Recommend increase fluid intake.  If symptoms do not improve recommend to follow-up in office.  For worsening symptoms, recommend to go to ED.    - POCT Urinalysis  - URINE CULTURE(NEW); Future  - nitrofurantoin (MACROBID) 100 MG Cap; Take 1 Capsule by mouth 2 times a day for 5 days.  Dispense: 10 Capsule; Refill: 0    Medical Decision Making/Course:  In the course of preparing for this visit with review of the pertinent past medical history, recent and past clinic visits, current medications, and performing chart, immunization, medical history and medication reconciliation, and in the further course of obtaining the current history pertinent to the clinic visit today, performing an exam and evaluation, ordering and independently evaluating labs, tests, and/or procedures, prescribing any recommended new medications as noted above, providing any pertinent counseling and education and recommending further coordination of care. This was discussed with patient in a shared-decision making conversation, and they understand and agreed with plan of care.     Return in about 3 months (around 5/1/2024), or if symptoms worsen or fail to improve, for med check.    ADELFO Sanchez   [facility]    Please note that this  dictation was created using voice recognition software. I have made every reasonable attempt to correct obvious errors, but I expect that there are errors of grammar and possibly content that I did not discover before finalizing the note.

## 2024-03-13 ENCOUNTER — TELEPHONE (OUTPATIENT)
Dept: HEALTH INFORMATION MANAGEMENT | Facility: OTHER | Age: 69
End: 2024-03-13

## 2024-03-13 ENCOUNTER — OFFICE VISIT (OUTPATIENT)
Dept: MEDICAL GROUP | Facility: IMAGING CENTER | Age: 69
End: 2024-03-13
Payer: MEDICARE

## 2024-03-13 VITALS
HEART RATE: 58 BPM | WEIGHT: 255 LBS | HEIGHT: 69 IN | SYSTOLIC BLOOD PRESSURE: 130 MMHG | RESPIRATION RATE: 16 BRPM | TEMPERATURE: 97.8 F | OXYGEN SATURATION: 98 % | DIASTOLIC BLOOD PRESSURE: 82 MMHG | BODY MASS INDEX: 37.77 KG/M2

## 2024-03-13 DIAGNOSIS — M18.0 PRIMARY ARTHROSIS OF FIRST CARPOMETACARPAL JOINTS, BILATERAL: ICD-10-CM

## 2024-03-13 DIAGNOSIS — I89.0 LYMPHEDEMA OF LEFT LOWER EXTREMITY: ICD-10-CM

## 2024-03-13 DIAGNOSIS — M51.36 DDD (DEGENERATIVE DISC DISEASE), LUMBAR: ICD-10-CM

## 2024-03-13 DIAGNOSIS — Z00.00 ENCOUNTER FOR MEDICARE ANNUAL WELLNESS EXAM: ICD-10-CM

## 2024-03-13 DIAGNOSIS — E66.01 MORBID (SEVERE) OBESITY DUE TO EXCESS CALORIES (HCC): ICD-10-CM

## 2024-03-13 DIAGNOSIS — G89.4 CHRONIC PAIN SYNDROME: ICD-10-CM

## 2024-03-13 PROCEDURE — 3075F SYST BP GE 130 - 139MM HG: CPT

## 2024-03-13 PROCEDURE — G0439 PPPS, SUBSEQ VISIT: HCPCS

## 2024-03-13 PROCEDURE — 1126F AMNT PAIN NOTED NONE PRSNT: CPT

## 2024-03-13 PROCEDURE — 3079F DIAST BP 80-89 MM HG: CPT

## 2024-03-13 ASSESSMENT — ENCOUNTER SYMPTOMS: GENERAL WELL-BEING: GOOD

## 2024-03-13 ASSESSMENT — ACTIVITIES OF DAILY LIVING (ADL): BATHING_REQUIRES_ASSISTANCE: 0

## 2024-03-13 ASSESSMENT — PAIN SCALES - GENERAL: PAINLEVEL: NO PAIN

## 2024-03-13 ASSESSMENT — PATIENT HEALTH QUESTIONNAIRE - PHQ9: CLINICAL INTERPRETATION OF PHQ2 SCORE: 0

## 2024-03-13 ASSESSMENT — FIBROSIS 4 INDEX: FIB4 SCORE: 1.697056274847714058

## 2024-03-13 NOTE — PROGRESS NOTES
Chief Complaint   Patient presents with    Annual Exam       HPI:  Dorcas Michael is a 68 y.o. here for Medicare Annual Wellness Visit     Lymphedema of both lower extremity  Chronic pain syndrome  Chronic use of opiate for therapeutic purpose  Chronic and ongoing condition for ~20 years. Pt takes Celebrex and Percocet PRN which is helpful.   She is seeing PT 2x/week and lymphedema clinic and acupuncture for this.   She wears compression stockings prescribed by PT.   Patient presents today to request refill on Percocet. Patient takes percocet a few times a week depending on her pain level. She does uses it sparingly.   Denies constipation.   She denies fevers, chills, fatigue, malaise.  Denies lower extremity redness, warmth, skin discoloration, numbness, tingling, weakness.    Obesity BMI 30.0-39.9  Established condition.   Diet:she eats health, protein and vegetables, low sugar  Exercise: admits that she has not been exercising as much. She recently started a part time job and has interfered with her routine. She will stop working in June and resume her exercise routine.   She tries to stay active by taking the stairs.     Patient Active Problem List    Diagnosis Date Noted    Morbid (severe) obesity due to excess calories (HCC) 03/13/2024    Body mass index (BMI) 37.0-37.9, adult 03/13/2024    Chronic pain syndrome 02/01/2024    Chronic use of opiate drug for therapeutic purpose 02/01/2024    Left lower quadrant pain 04/18/2023    Itchy eyes 02/23/2023    DDD (degenerative disc disease), lumbar 12/22/2022    Chronic midline low back pain without sciatica 12/22/2022    Right thigh pain 11/10/2021    Recurrent UTI 11/10/2021    Umbilical hernia without obstruction and without gangrene 05/20/2020    Achilles tendinitis of left lower extremity 05/20/2020    Primary arthrosis of first carpometacarpal joints, bilateral 08/22/2017    Obesity (BMI 30-39.9) 06/29/2017    Bilateral primary osteoarthritis of first  carpometacarpal joints 09/20/2016    Lymphedema of left lower extremity 06/09/2016    History of bilateral knee replacement 06/09/2016       Current Outpatient Medications   Medication Sig Dispense Refill    celecoxib (CELEBREX) 200 MG Cap TAKE 1 CAPSULE BY MOUTH EVERY DAY 90 Capsule 0    Probiotic Product (PROBIOTIC BLEND PO)       nystatin (MYCOSTATIN) 694396 UNIT/GM Cream topical cream APPLY 1 GM TO AFFECTED AREA(S) 2 TIMES A DAY. 90 g 2    B Complex Vitamins (VITAMIN B COMPLEX PO) every day.      Cholecalciferol (VITAMIN D3) 5000 UNITS Cap Take 1 Capsule by mouth every day.      FIBER PO Take  by mouth every day.      diphenhydrAMINE (BENADRYL) 25 MG Tab Take 1 Tablet by mouth every 6 hours as needed for Sleep.       No current facility-administered medications for this visit.          Current supplements as per medication list.     Allergies: Patient has no known allergies.    Current social contact/activities: [employer]      She  reports that she has never smoked. She has never used smokeless tobacco. She reports current alcohol use of about 0.6 - 2.4 oz of alcohol per week. She reports that she does not use drugs.  Counseling given: Not Answered      ROS:    Gait: Uses no assistive device  Ostomy: No  Other tubes: No  Amputations: No  Chronic oxygen use: No  Last eye exam: 02/14/24  Wears hearing aids: No   : Denies any urinary leakage during the last 6 months    Screening:    Depression Screening  Little interest or pleasure in doing things?  0 - not at all  Feeling down, depressed , or hopeless? 0 - not at all  Trouble falling or staying asleep, or sleeping too much?     Feeling tired or having little energy?     Poor appetite or overeating?     Feeling bad about yourself - or that you are a failure or have let yourself or your family down?    Trouble concentrating on things, such as reading the newspaper or watching television?    Moving or speaking so slowly that other people could have noticed.  Or  the opposite - being so fidgety or restless that you have been moving around a lot more than usual?     Thoughts that you would be better off dead, or of hurting yourself?     Patient Health Questionnaire Score:      If depressive symptoms identified deferred to follow up visit unless specifically addressed in assessment and plan.    Interpretation of PHQ-9 Total Score   Score Severity   1-4 No Depression   5-9 Mild Depression   10-14 Moderate Depression   15-19 Moderately Severe Depression   20-27 Severe Depression    Screening for Cognitive Impairment  Do you or any of your friends or family members have any concern about your memory? No  Three Minute Recall (Leader, Season, Table) 3/3    Daniel clock face with all 12 numbers and set the hands to show 10 minutes after 11.  Yes    Cognitive concerns identified deferred for follow up unless specifically addressed in assessment and plan.    Fall Risk Assessment  Has the patient had two or more falls in the last year or any fall with injury in the last year?  No    Safety Assessment  Do you always wear your seatbelt?  Yes  Any changes to home needed to function safely? No  Difficulty hearing.  No  Patient counseled about all safety risks that were identified.    Functional Assessment ADLs  Are there any barriers preventing you from cooking for yourself or meeting nutritional needs?  No.    Are there any barriers preventing you from driving safely or obtaining transportation?  No.    Are there any barriers preventing you from using a telephone or calling for help?  No    Are there any barriers preventing you from shopping?  No.    Are there any barriers preventing you from taking care of your own finances?  No    Are there any barriers preventing you from managing your medications?  No    Are there any barriers preventing you from showering, bathing or dressing yourself? No    Are there any barriers preventing you from doing housework or laundry? No    Are there any  barriers preventing you from using the toilet?No    Are you currently engaging in any exercise or physical activity?  Yes. Home exercise, stairs, biking      Self-Assessment of Health  What is your perception of your health? Good    Do you sleep more than six hours a night? No    In the past 7 days, how much did pain keep you from doing your normal work? None    Do you spend quality time with family or friends (virtually or in person)? No    Do you usually eat a heart healthy diet that constists of a variety of fruits, vegetables, whole grains and fiber? Yes    Do you eat foods high in fat and/or Fast Food more than three times per week? No    How concerned are you that your medical conditions are not being well managed? Not at all    Are you worried that in the next 2 months, you may not have stable housing that you own, rent, or stay in as part of a household? No        Advance Care Planning  Do you have an Advance Directive, Living Will, Durable Power of , or POLST? No, hand out given                  Health Maintenance Summary            Postponed - Influenza Vaccine (1) Postponed until 11/1/2024      No completion history exists for this topic.              Urine Drug Screening (Every 360 Days) Tentatively due on 7/13/2024 07/19/2023  URINE DRUG SCREEN W/O CONF (AR)    04/07/2022  PAIN MANAGEMENT SCRN, UR              Bone Density Scan (Every 5 Years) Tentatively due on 9/24/2024 09/24/2019  DS-BONE DENSITY STUDY (DEXA)    06/25/2013  DS-BONE DENSITY STUDY (DEXA)              Annual Wellness Visit (Yearly) Next due on 3/13/2025      03/13/2024  Visit Dx: Encounter for Medicare annual wellness exam    02/23/2023  Visit Dx: Encounter for Medicare annual wellness exam    02/23/2023  Level of Service: GA ANNUAL WELLNESS VISIT-INCLUDES PPPS SUBSEQUE*    07/06/2021  Level of Service: GA PREVENTIVE VISIT,EST,65 & OVER              Mammogram (Every 2 Years) Next due on 4/3/2025      04/03/2023   MA-SCREENING MAMMO BILAT W/TOMOSYNTHESIS W/CAD    02/28/2022  MA-SCREENING MAMMO BILAT W/TOMOSYNTHESIS W/CAD    12/21/2020  MA-SCREENING MAMMO BILAT W/TOMOSYNTHESIS W/CAD    09/24/2019  MA-SCREENING MAMMO BILAT W/TOMOSYNTHESIS W/CAD    03/29/2018  MA-MAMMO SCREENING BILAT W/CRISTINA W/CAD    Only the first 5 history entries have been loaded, but more history exists.              Colorectal Cancer Screening (Colonoscopy - Every 5 Years) Tentatively due on 1/20/2026 01/20/2021  REFERRAL TO GI FOR COLONOSCOPY    07/31/2015  REFERRAL TO GI FOR COLONOSCOPY    02/24/2005  REFERRAL TO GI FOR COLONOSCOPY              IMM DTaP/Tdap/Td Vaccine (2 - Td or Tdap) Next due on 11/14/2027 11/14/2017  Imm Admin: Tdap Vaccine              Zoster (Shingles) Vaccines (Series Information) Completed      08/09/2022  Imm Admin: Zoster Vaccine Recombinant (RZV) (SHINGRIX)    12/21/2021  Imm Admin: Zoster Vaccine Recombinant (RZV) (SHINGRIX)    10/01/2014  Imm Admin: Zoster Vaccine Live (ZVL) (Zostavax) - HISTORICAL DATA              Pneumococcal Vaccine: 65+ Years (Series Information) Completed      02/23/2023  Imm Admin: Pneumococcal Conjugate Vaccine (PCV20)    11/19/2019  Imm Admin: Pneumococcal polysaccharide vaccine (PPSV-23)              COVID-19 Vaccine (Series Information) Completed      11/05/2023  Imm Admin: Comirnaty (Covid-19 Vaccine, Mrna, [id number] Formula)    12/13/2021  Imm Admin: MODERNA SARS-COV-2 VACCINE (12+)    04/10/2021  Imm Admin: MODERNA SARS-COV-2 VACCINE (12+)    03/12/2021  Imm Admin: MODERNA SARS-COV-2 VACCINE (12+)              Hepatitis A Vaccine (Hep A) (Series Information) Aged Out      No completion history exists for this topic.              Hepatitis B Vaccine (Hep B) (Series Information) Aged Out      No completion history exists for this topic.              HPV Vaccines (Series Information) Aged Out      No completion history exists for this topic.              Polio Vaccine (Inactivated Polio)  (Series Information) Aged Out      No completion history exists for this topic.              Meningococcal Immunization (Series Information) Aged Out      No completion history exists for this topic.              Discontinued - Cervical Cancer Screening  Discontinued        Frequency changed to Never automatically (Topic No Longer Applies)    07/06/2021  THINPREP PAP WITH HPV    07/06/2021  Pathology Gynecology Specimen    09/13/2016  THINPREP PAP WITH HPV    09/13/2016  PATHOLOGY GYN SPECIMEN    Only the first 5 history entries have been loaded, but more history exists.              Discontinued - Hepatitis C Screening  Discontinued      No completion history exists for this topic.                    Patient Care Team:  ADELFO Sanchez as PCP - General (Nurse Practitioner Family)  Karina Silva P.A.-C. as PCP - [facility] Paneled  Alfonso Henry Ass't (Inactive) as    [place]  as Consulting Physician  Matt Torrez O.D. as Consulting Physician (Optometry)      Social History     Tobacco Use    Smoking status: Never    Smokeless tobacco: Never   Vaping Use    Vaping Use: Never used   Substance Use Topics    Alcohol use: Yes     Alcohol/week: 0.6 - 2.4 oz     Types: 1 - 4 Glasses of wine per week     Comment: 1-2 per day    Drug use: No     Family History   Problem Relation Age of Onset    Heart Disease Other     Other Mother         MS    Thyroid Daughter     GI Disease Other         Crohns    Thyroid Sister     Hyperlipidemia Sister     Hyperlipidemia Father      She  has a past medical history of Arthritis, Lymphedema (1995), Pain, Pain, and Pain.   Past Surgical History:   Procedure Laterality Date    VENTRAL HERNIA REPAIR LAPAROSCOPIC  2/16/2021    Procedure: REPAIR, HERNIA, VENTRAL, LAPAROSCOPIC - UMBILICAL;  Surgeon: Lilliana Mejia M.D.;  Location: SURGERY SAME DAY [facility];  Service: General    HERNIA REPAIR  02/16/2021    Umbilical     FINGER ARTHROPLASTY  "Left 8/22/2017    Procedure: FINGER ARTHROPLASTY - CARPAL METACARPAL;  Surgeon: Magnus Anderson M.D.;  Location: [facility];  Service:     TENDON TRANSFER Left 8/22/2017    Procedure: TENDON TRANSFER - FLEXI CARPI RADIALIS;  Surgeon: Magnus Anderson M.D.;  Location: [facility];  Service:     CAPSULOTOMY  8/22/2017    Procedure: CAPSULOTOMY - METACARPAL PHALANGEAL JOINT CAPSULODESIS;  Surgeon: Magnus Anderson M.D.;  Location: [facility];  Service:     FINGER ARTHROPLASTY Right 9/20/2016    Procedure: FINGER ARTHROPLASTY - CARPAL METACARPAL;  Surgeon: Magnus Anderson M.D.;  Location: [facility];  Service:     TENDON TRANSFER Right 9/20/2016    Procedure: TENDON TRANSFER - FLEXI CARPI RADIALIS;  Surgeon: Magnus Anderson M.D.;  Location: [facility];  Service:     PIN INSERTION Right 9/20/2016    Procedure: PIN INSERTION - METACARPAL PHALANGEAL JOINT;  Surgeon: Magnus Anderson M.D.;  Location: [facility];  Service:     KNEE MANIPULATION  3/1/2010    Performed by OSGOOD, PATRICK J at [facility]    KNEE ARTHROPLASTY TOTAL  11/3/2009    Performed by OSGOOD, PATRICK J at [facility]    ACHILLES TENDON REP Right 5/2005    right    KNEE ARTHROPLASTY TOTAL Right 12/2003    right    KNEE ARTHROSCOPY Right 5/1998    right    RECTOCELE REPAIR  6/1990    repair cystocele    KNEE ARTHROTOMY Left 1973    left lateral meniscectomy    KNEE ARTHROTOMY Right 1971    right lateral meniscectomy       Exam:   /82 (BP Location: Right arm, Patient Position: Sitting, BP Cuff Size: Adult)   Pulse (!) 58   Temp 36.6 °C (97.8 °F) (Temporal)   Resp 16   Ht 1.753 m (5' 9\")   Wt 116 kg (255 lb)   SpO2 98%  Body mass index is 37.66 kg/m².    Hearing good.    Dentition good  Alert, oriented in no acute distress.  Eye contact is good, speech goal directed, affect calm    Assessment and Plan. The following treatment and monitoring " plan is recommended:    1. Encounter for Medicare annual wellness exam  PMH/PSH/FH/Social history reviewed. Medication reconciled. Vaccinations discussed. Previous records and labs reviewed. Discussed age appropriate anticipatory guidance.    2. Morbid (severe) obesity due to excess calories (HCC)  3. Body mass index (BMI) 37.0-37.9, adult  Discussed lifestyle and dietary changes such as low-carb diet, high in vegetables and fresh fruits.  Encouraged to increase water intake.  Regular physical exercise at least 30 minutes/day 5 days a week. Weight reduction if applicable (aim for 5% to 10% body weight increments).     4. Primary arthrosis of first carpometacarpal joints, bilateral  5. Chronic pain syndrome  6. DDD (degenerative disc disease), lumbar  7. Lymphedema of left lower extremity  Chronic and stable condition on Celebrex and Percocet as needed.  She only takes Percocet on severe cases and uses them sparingly. No refills needed at this time.   Recommend healthy diet, regular exercise, and healthy weight loss.     Services suggested: No services needed at this time  Health Care Screening: Age-appropriate preventive services recommended by USPTF and ACIP covered by Medicare were discussed today. Services ordered if indicated and agreed upon by the patient.  Referrals offered: Community-based lifestyle interventions to reduce health risks and promote self-management and wellness, fall prevention, nutrition, physical activity, tobacco-use cessation, weight loss, and mental health services as per orders if indicated.    Discussion today about general wellness and lifestyle habits:    Prevent falls and reduce trip hazards; Cautioned about securing or removing rugs.  Have a working fire alarm and carbon monoxide detector;   Engage in regular physical activity and social activities     Follow-up: Return in 3 months (on 6/13/2024), or if symptoms worsen or fail to improve.    Thank you, [facility]

## 2024-03-16 DIAGNOSIS — M18.0 PRIMARY ARTHROSIS OF FIRST CARPOMETACARPAL JOINTS, BILATERAL: ICD-10-CM

## 2024-03-18 RX ORDER — CELECOXIB 200 MG/1
200 CAPSULE ORAL
Qty: 90 CAPSULE | Refills: 0 | Status: SHIPPED | OUTPATIENT
Start: 2024-03-18

## 2024-03-20 ENCOUNTER — OFFICE VISIT (OUTPATIENT)
Dept: MEDICAL GROUP | Facility: IMAGING CENTER | Age: 69
End: 2024-03-20
Payer: MEDICARE

## 2024-03-20 VITALS
DIASTOLIC BLOOD PRESSURE: 80 MMHG | WEIGHT: 255 LBS | RESPIRATION RATE: 14 BRPM | TEMPERATURE: 97.4 F | HEART RATE: 85 BPM | HEIGHT: 69 IN | OXYGEN SATURATION: 98 % | BODY MASS INDEX: 37.77 KG/M2 | SYSTOLIC BLOOD PRESSURE: 122 MMHG

## 2024-03-20 DIAGNOSIS — N39.0 URINARY TRACT INFECTION WITH FEVER: ICD-10-CM

## 2024-03-20 DIAGNOSIS — N39.0 RECURRENT UTI: ICD-10-CM

## 2024-03-20 DIAGNOSIS — R11.0 NAUSEA: ICD-10-CM

## 2024-03-20 LAB
APPEARANCE UR: NORMAL
BILIRUB UR STRIP-MCNC: NORMAL MG/DL
COLOR UR AUTO: NORMAL
GLUCOSE UR STRIP.AUTO-MCNC: NORMAL MG/DL
KETONES UR STRIP.AUTO-MCNC: NORMAL MG/DL
LEUKOCYTE ESTERASE UR QL STRIP.AUTO: NORMAL
NITRITE UR QL STRIP.AUTO: POSITIVE
PH UR STRIP.AUTO: 6 [PH] (ref 5–8)
PROT UR QL STRIP: 100 MG/DL
RBC UR QL AUTO: NORMAL
SP GR UR STRIP.AUTO: 1.01
UROBILINOGEN UR STRIP-MCNC: 1 MG/DL

## 2024-03-20 PROCEDURE — 3079F DIAST BP 80-89 MM HG: CPT

## 2024-03-20 PROCEDURE — 3074F SYST BP LT 130 MM HG: CPT

## 2024-03-20 PROCEDURE — 99213 OFFICE O/P EST LOW 20 MIN: CPT

## 2024-03-20 PROCEDURE — 81002 URINALYSIS NONAUTO W/O SCOPE: CPT

## 2024-03-20 RX ORDER — SULFAMETHOXAZOLE AND TRIMETHOPRIM 800; 160 MG/1; MG/1
1 TABLET ORAL 2 TIMES DAILY
Qty: 10 TABLET | Refills: 0 | Status: SHIPPED | OUTPATIENT
Start: 2024-03-20 | End: 2024-03-23

## 2024-03-20 RX ORDER — ONDANSETRON 4 MG/1
4 TABLET, FILM COATED ORAL EVERY 4 HOURS PRN
Qty: 20 TABLET | Refills: 0 | Status: SHIPPED | OUTPATIENT
Start: 2024-03-20

## 2024-03-20 ASSESSMENT — FIBROSIS 4 INDEX: FIB4 SCORE: 1.697056274847714058

## 2024-03-20 NOTE — PROGRESS NOTES
Subjective:     CC:   Chief Complaint   Patient presents with    UTI    Fever     X 2 days cold and fever broke        HPI:   Dorcas presents today to discuss:    Recurrent UTI  Patient was recently dx with UTI and completed Macrobid one month ago.   She developed urinary frequency starting Friday while traveling.    She developed a fever 2 days ago.  She has been taking Tylenol which helps break her fever. Last dose taken was last night.   She is having some mild left sided flank pain.   She denies dysuria, nausea/vomiting, hematuria, incontinence, persistent high fever.       Past Medical History:   Diagnosis Date    Arthritis     both hands    Lymphedema 1995    bilateral legs    Pain     left knee 4/10    Pain     hands, feet    Pain     left hand     Family History   Problem Relation Age of Onset    Heart Disease Other     Other Mother         MS    Thyroid Daughter     GI Disease Other         Crohns    Thyroid Sister     Hyperlipidemia Sister     Hyperlipidemia Father      Past Surgical History:   Procedure Laterality Date    VENTRAL HERNIA REPAIR LAPAROSCOPIC  2/16/2021    Procedure: REPAIR, HERNIA, VENTRAL, LAPAROSCOPIC - UMBILICAL;  Surgeon: Lilliana Mejia M.D.;  Location: SURGERY SAME DAY [facility];  Service: General    HERNIA REPAIR  02/16/2021    Umbilical     FINGER ARTHROPLASTY Left 8/22/2017    Procedure: FINGER ARTHROPLASTY - CARPAL METACARPAL;  Surgeon: Magnus Anderson M.D.;  Location: [facility];  Service:     TENDON TRANSFER Left 8/22/2017    Procedure: TENDON TRANSFER - FLEXI CARPI RADIALIS;  Surgeon: Magnus Anderson M.D.;  Location: [facility];  Service:     CAPSULOTOMY  8/22/2017    Procedure: CAPSULOTOMY - METACARPAL PHALANGEAL JOINT CAPSULODESIS;  Surgeon: Magnus Anderson M.D.;  Location: [facility];  Service:     FINGER ARTHROPLASTY Right 9/20/2016    Procedure: FINGER ARTHROPLASTY - CARPAL METACARPAL;  Surgeon: Magnus Anderson M.D.;  Location:  SURGERY [facility];  Service:     TENDON TRANSFER Right 9/20/2016    Procedure: TENDON TRANSFER - FLEXI CARPI RADIALIS;  Surgeon: Magnus Anderson M.D.;  Location: SURGERY [facility];  Service:     PIN INSERTION Right 9/20/2016    Procedure: PIN INSERTION - METACARPAL PHALANGEAL JOINT;  Surgeon: Magnus Anderson M.D.;  Location: SURGERY [facility];  Service:     KNEE MANIPULATION  3/1/2010    Performed by OSGOOD, PATRICK J at SURGERY [facility]    KNEE ARTHROPLASTY TOTAL  11/3/2009    Performed by OSGOOD, PATRICK J at SURGERY [facility]    ACHILLES TENDON REP Right 5/2005    right    KNEE ARTHROPLASTY TOTAL Right 12/2003    right    KNEE ARTHROSCOPY Right 5/1998    right    RECTOCELE REPAIR  6/1990    repair cystocele    KNEE ARTHROTOMY Left 1973    left lateral meniscectomy    KNEE ARTHROTOMY Right 1971    right lateral meniscectomy     Social History     Tobacco Use    Smoking status: Never    Smokeless tobacco: Never   Vaping Use    Vaping Use: Never used   Substance Use Topics    Alcohol use: Yes     Alcohol/week: 0.6 - 2.4 oz     Types: 1 - 4 Glasses of wine per week     Comment: 1-2 per day    Drug use: No     Social History     Social History Narrative    Not on file     Current Outpatient Medications Ordered in Epic   Medication Sig Dispense Refill    sulfamethoxazole-trimethoprim (BACTRIM DS) 800-160 MG tablet Take 1 Tablet by mouth 2 times a day for 3 days. 10 Tablet 0    ondansetron (ZOFRAN) 4 MG Tab tablet Take 1 Tablet by mouth every four hours as needed for Nausea/Vomiting. 20 Tablet 0    celecoxib (CELEBREX) 200 MG Cap TAKE 1 CAPSULE BY MOUTH EVERY DAY 90 Capsule 0    Probiotic Product (PROBIOTIC BLEND PO)       nystatin (MYCOSTATIN) 067799 UNIT/GM Cream topical cream APPLY 1 GM TO AFFECTED AREA(S) 2 TIMES A DAY. 90 g 2    B Complex Vitamins (VITAMIN B COMPLEX PO) every day.      Cholecalciferol (VITAMIN D3) 5000 UNITS Cap Take 1 Capsule by mouth every day.      FIBER PO  "Take  by mouth every day.      diphenhydrAMINE (BENADRYL) 25 MG Tab Take 1 Tablet by mouth every 6 hours as needed for Sleep.       Current Facility-Administered Medications Ordered in Epic   Medication Dose Route Frequency Provider Last Rate Last Admin    cefTRIAXone (Rocephin) 1 g in lidocaine (Xylocaine) 1 % 4 mL for IM use  1 g Intramuscular Once LE SanchezPJOSIE         Patient has no known allergies.    ROS: see hpi  Gen: no fevers/chills  Pulm: no sob, no cough  CV: no chest pain, no palpitations, no edema  GI: no nausea/vomiting, no diarrhea  Skin: no rash    Objective:   Exam:  /80 (BP Location: Left arm, Patient Position: Sitting, BP Cuff Size: Adult)   Pulse 85   Temp 36.3 °C (97.4 °F) (Temporal)   Resp 14   Ht 1.753 m (5' 9\")   Wt 116 kg (255 lb) Comment: lbs  SpO2 98%   BMI 37.66 kg/m²    Body mass index is 37.66 kg/m².    Gen: Alert and oriented, No apparent distress.  HEENT: Head atraumatic, normocephalic. Pupils equal and round.  Neck: Neck is supple without lymphadenopathy.   Lungs: Normal effort, CTA bilaterally, no wheezes, rhonchi, or rales  CV: Regular rate and rhythm. No murmurs, rubs, or gallops.  ABD: +BS. Non-tender, non-distended. No rebound, rigidity, or guarding.  Ext: No clubbing, cyanosis, edema.  CVA tenderness negative  Assessment & Plan:     68 y.o. female with the following -     1. Recurrent UTI  2. Urinary tract infection with fever  Patient presentation consistent with UTI.  Urinalysis positive for RBC, nitrates, and leukocytes.  Patient has been febrile, responsive to tylenol. To prevent complication r/t to UTI such as pyelonephritis and sepsis. Discussed treating with Rocephin 1 g IM injection in office.  PT agreeable to this.  Will prescribe Bactrim 1 tab twice daily x 5 days.  Encouraged to increase fluid intake to ensure adequate hydration.  If symptoms do not improve or worsen, instructed patient to go to ED for immediate care.    - cefTRIAXone " (Rocephin) 1 g in lidocaine (Xylocaine) 1 % 4 mL for IM use  - sulfamethoxazole-trimethoprim (BACTRIM DS) 800-160 MG tablet; Take 1 Tablet by mouth 2 times a day for 3 days.  Dispense: 10 Tablet; Refill: 0    3. Nausea  Discussed importance of keeping herself well-hydrated and starting oral antibiotics for her UTI.  If she develops nausea, recommend to take Zofran as needed.  For worsening symptoms, advised to go to ED for immediate care.    - ondansetron (ZOFRAN) 4 MG Tab tablet; Take 1 Tablet by mouth every four hours as needed for Nausea/Vomiting.  Dispense: 20 Tablet; Refill: 0    Medical Decision Making/Course:  In the course of preparing for this visit with review of the pertinent past medical history, recent and past clinic visits, current medications, and performing chart, immunization, medical history and medication reconciliation, and in the further course of obtaining the current history pertinent to the clinic visit today, performing an exam and evaluation, ordering and independently evaluating labs, tests, and/or procedures, prescribing any recommended new medications as noted above, providing any pertinent counseling and education and recommending further coordination of care. This was discussed with patient in a shared-decision making conversation, and they understand and agreed with plan of care.     Return if symptoms worsen or fail to improve.    SHUKRI Sanchez.   [facility]    Please note that this dictation was created using voice recognition software. I have made every reasonable attempt to correct obvious errors, but I expect that there are errors of grammar and possibly content that I did not discover before finalizing the note.

## 2024-05-08 ENCOUNTER — APPOINTMENT (OUTPATIENT)
Dept: MEDICAL GROUP | Facility: IMAGING CENTER | Age: 69
End: 2024-05-08
Payer: MEDICARE

## 2024-05-08 VITALS
WEIGHT: 253 LBS | BODY MASS INDEX: 37.47 KG/M2 | HEART RATE: 63 BPM | OXYGEN SATURATION: 95 % | SYSTOLIC BLOOD PRESSURE: 132 MMHG | DIASTOLIC BLOOD PRESSURE: 80 MMHG | RESPIRATION RATE: 14 BRPM | HEIGHT: 69 IN | TEMPERATURE: 96 F

## 2024-05-08 DIAGNOSIS — Z79.891 CHRONIC USE OF OPIATE FOR THERAPEUTIC PURPOSE: ICD-10-CM

## 2024-05-08 DIAGNOSIS — G89.4 CHRONIC PAIN SYNDROME: ICD-10-CM

## 2024-05-08 DIAGNOSIS — I89.0 LYMPHEDEMA OF LEFT LOWER EXTREMITY: ICD-10-CM

## 2024-05-08 PROCEDURE — 99213 OFFICE O/P EST LOW 20 MIN: CPT

## 2024-05-08 PROCEDURE — 3075F SYST BP GE 130 - 139MM HG: CPT

## 2024-05-08 PROCEDURE — 3079F DIAST BP 80-89 MM HG: CPT

## 2024-05-08 RX ORDER — OXYCODONE HYDROCHLORIDE AND ACETAMINOPHEN 5; 325 MG/1; MG/1
1 TABLET ORAL
Qty: 30 TABLET | Refills: 0 | Status: SHIPPED | OUTPATIENT
Start: 2024-05-08 | End: 2024-06-07

## 2024-05-08 ASSESSMENT — FIBROSIS 4 INDEX: FIB4 SCORE: 1.697056274847714058

## 2024-05-08 NOTE — PROGRESS NOTES
Subjective:     CC:   Chief Complaint   Patient presents with    Medication Refill     Oxycodone     Other     Testing for the nuclear for the lymphedema   After the result need referral to [facility] and need records from Kristie Escalera 6 mos for Lymphedema  from her chart       HPI:   Dorcas presents today to discuss:    Lymphedema of both lower extremity  Chronic pain syndrome  Chronic use of opiate for therapeutic purpose  Chronic and ongoing condition for ~20 years. Pt takes Celebrex and Percocet PRN which is helpful.   Patient completed PT for lymphedema management.    She wears compression stockings prescribed by PT.   Patient presents today to request refill on Percocet. Patient takes percocet a few times a week depending on her pain level. She uses it sparingly.   Denies constipation.   She is requesting referral to vascular to repeat nuclear testing required by [facility].   She denies fevers, chills, fatigue, malaise.  Denies lower extremity redness, warmth, skin discoloration, numbness, tingling, weakness.      Past Medical History:   Diagnosis Date    Arthritis     both hands    Lymphedema 1995    bilateral legs    Pain     left knee 4/10    Pain     hands, feet    Pain     left hand     Family History   Problem Relation Age of Onset    Heart Disease Other     Other Mother         MS    Thyroid Daughter     GI Disease Other         Crohns    Thyroid Sister     Hyperlipidemia Sister     Hyperlipidemia Father      Past Surgical History:   Procedure Laterality Date    VENTRAL HERNIA REPAIR LAPAROSCOPIC  2/16/2021    Procedure: REPAIR, HERNIA, VENTRAL, LAPAROSCOPIC - UMBILICAL;  Surgeon: Lilliana Mejia M.D.;  Location: SURGERY [facility];  Service: General    HERNIA REPAIR  02/16/2021    Umbilical     FINGER ARTHROPLASTY Left 8/22/2017    Procedure: FINGER ARTHROPLASTY - CARPAL METACARPAL;  Surgeon: Magnus Anderson M.D.;  Location: SURGERY [facility];  Service:      TENDON TRANSFER Left 8/22/2017    Procedure: TENDON TRANSFER - FLEXI CARPI RADIALIS;  Surgeon: Magnus Anderson M.D.;  Location: [facility];  Service:     CAPSULOTOMY  8/22/2017    Procedure: CAPSULOTOMY - METACARPAL PHALANGEAL JOINT CAPSULODESIS;  Surgeon: Magnus Anderson M.D.;  Location: [facility];  Service:     FINGER ARTHROPLASTY Right 9/20/2016    Procedure: FINGER ARTHROPLASTY - CARPAL METACARPAL;  Surgeon: Magnus Anderson M.D.;  Location: SURGERY [facility];  Service:     TENDON TRANSFER Right 9/20/2016    Procedure: TENDON TRANSFER - FLEXI CARPI RADIALIS;  Surgeon: Magnus Anderson M.D.;  Location: [facility];  Service:     PIN INSERTION Right 9/20/2016    Procedure: PIN INSERTION - METACARPAL PHALANGEAL JOINT;  Surgeon: Magnus Anderson M.D.;  Location: [facility];  Service:     KNEE MANIPULATION  3/1/2010    Performed by OSGOOD, PATRICK J at [facility]    KNEE ARTHROPLASTY TOTAL  11/3/2009    Performed by OSGOOD, PATRICK J at [facility]    ACHILLES TENDON REP Right 5/2005    right    KNEE ARTHROPLASTY TOTAL Right 12/2003    right    KNEE ARTHROSCOPY Right 5/1998    right    RECTOCELE REPAIR  6/1990    repair cystocele    KNEE ARTHROTOMY Left 1973    left lateral meniscectomy    KNEE ARTHROTOMY Right 1971    right lateral meniscectomy     Social History     Tobacco Use    Smoking status: Never    Smokeless tobacco: Never   Vaping Use    Vaping Use: Never used   Substance Use Topics    Alcohol use: Yes     Alcohol/week: 0.6 - 2.4 oz     Types: 1 - 4 Glasses of wine per week     Comment: 1-2 per day    Drug use: No     Social History     Social History Narrative    Not on file     Current Outpatient Medications Ordered in Epic   Medication Sig Dispense Refill    oxyCODONE-acetaminophen (PERCOCET) 5-325 MG Tab Take 1 Tablet by mouth 1 time a day as needed for Severe Pain for up to 30 days. 30 Tablet 0    celecoxib  "(CELEBREX) 200 MG Cap TAKE 1 CAPSULE BY MOUTH EVERY DAY 90 Capsule 0    Probiotic Product (PROBIOTIC BLEND PO)       nystatin (MYCOSTATIN) 268346 UNIT/GM Cream topical cream APPLY 1 GM TO AFFECTED AREA(S) 2 TIMES A DAY. 90 g 2    B Complex Vitamins (VITAMIN B COMPLEX PO) every day.      Cholecalciferol (VITAMIN D3) 5000 UNITS Cap Take 1 Capsule by mouth every day.      FIBER PO Take  by mouth every day.      diphenhydrAMINE (BENADRYL) 25 MG Tab Take 1 Tablet by mouth every 6 hours as needed for Sleep.      ondansetron (ZOFRAN) 4 MG Tab tablet Take 1 Tablet by mouth every four hours as needed for Nausea/Vomiting. 20 Tablet 0     No current Epic-ordered facility-administered medications on file.     Patient has no known allergies.    ROS: see hpi  Gen: no fevers/chills  Pulm: no sob, no cough  CV: no chest pain, no palpitations, no edema  GI: no nausea/vomiting, no diarrhea  Skin: no rash    Objective:   Exam:  /80 (BP Location: Left arm, Patient Position: Sitting, BP Cuff Size: Adult)   Pulse 63   Temp (!) 35.6 °C (96 °F) (Temporal)   Resp 14   Ht 1.753 m (5' 9\")   Wt 115 kg (253 lb)   SpO2 95%   BMI 37.36 kg/m²    Body mass index is 37.36 kg/m².    Gen: Alert and oriented, No apparent distress.  HEENT: Head atraumatic, normocephalic. Pupils equal and round.  Neck: Neck is supple without lymphadenopathy.   Lungs: Normal effort, CTA bilaterally, no wheezes, rhonchi, or rales  CV: Regular rate and rhythm. No murmurs, rubs, or gallops.  ABD: +BS. Non-tender, non-distended. No rebound, rigidity, or guarding.  Ext: No clubbing, cyanosis, edema.    Assessment & Plan:     68 y.o. female with the following -     1. Lymphedema of left lower extremity  oxyCODONE-acetaminophen (PERCOCET) 5-325 MG Tab    Referral to Vascular Medicine      2. Chronic pain syndrome  oxyCODONE-acetaminophen (PERCOCET) 5-325 MG Tab      3. Chronic use of opiate for therapeutic purpose  oxyCODONE-acetaminophen (PERCOCET) 5-325 MG Tab    "     Chronic and ongoing condition. Pain well managed with percocet PRN. Med refill sent to pharmacy.   Obtained and reviewed patient utilization report from [facility] pharmacy database on 5/8/2024 7:21 PM  prior to writing prescription for controlled substance II, III or IV per [place] bill . Based on assessment of the report, the prescription is medically necessary.   Will refer to vascular medicine.     Medical Decision Making/Course:  In the course of preparing for this visit with review of the pertinent past medical history, recent and past clinic visits, current medications, and performing chart, immunization, medical history and medication reconciliation, and in the further course of obtaining the current history pertinent to the clinic visit today, performing an exam and evaluation, ordering and independently evaluating labs, tests, and/or procedures, prescribing any recommended new medications as noted above, providing any pertinent counseling and education and recommending further coordination of care. This was discussed with patient in a shared-decision making conversation, and they understand and agreed with plan of care.     Return in about 3 months (around 8/8/2024), or if symptoms worsen or fail to improve.    SHUKRI Sanchez.   [facility]    Please note that this dictation was created using voice recognition software. I have made every reasonable attempt to correct obvious errors, but I expect that there are errors of grammar and possibly content that I did not discover before finalizing the note.

## 2024-05-15 ENCOUNTER — TELEPHONE (OUTPATIENT)
Dept: HEALTH INFORMATION MANAGEMENT | Facility: OTHER | Age: 69
End: 2024-05-15
Payer: MEDICARE

## 2024-06-11 ENCOUNTER — TELEPHONE (OUTPATIENT)
Dept: VASCULAR LAB | Facility: MEDICAL CENTER | Age: 69
End: 2024-06-11
Payer: MEDICARE

## 2024-06-11 NOTE — TELEPHONE ENCOUNTER
Spoke to patient in regarding NP appointment.    Any recent testing (labs or imaging) outside of [facility]?  No    Were any records requested?  No    New patient packet sent via DisplayLink or mail?  Yes    Referral attached to appointment (renewals and New patients only)? Yes    Is appointment virtual? No

## 2024-06-14 DIAGNOSIS — M18.0 PRIMARY ARTHROSIS OF FIRST CARPOMETACARPAL JOINTS, BILATERAL: ICD-10-CM

## 2024-06-14 NOTE — TELEPHONE ENCOUNTER
Received request via: Pharmacy    Was the patient seen in the last year in this department? Yes    Does the patient have an active prescription (recently filled or refills available) for medication(s) requested? No    Pharmacy Name:   To be filled at: [id number] - [place], [place] - [address]     Does the patient have skilled nursing Plus and need 100 day supply (blood pressure, diabetes and cholesterol meds only)? Yes, quantity updated to 100 days

## 2024-06-15 RX ORDER — CELECOXIB 200 MG/1
200 CAPSULE ORAL
Qty: 90 CAPSULE | Refills: 0 | Status: SHIPPED | OUTPATIENT
Start: 2024-06-15

## 2024-06-18 ENCOUNTER — OFFICE VISIT (OUTPATIENT)
Dept: VASCULAR LAB | Facility: MEDICAL CENTER | Age: 69
End: 2024-06-18
Attending: FAMILY MEDICINE
Payer: MEDICARE

## 2024-06-18 VITALS
WEIGHT: 255 LBS | HEART RATE: 65 BPM | SYSTOLIC BLOOD PRESSURE: 135 MMHG | DIASTOLIC BLOOD PRESSURE: 77 MMHG | HEIGHT: 69 IN | BODY MASS INDEX: 37.77 KG/M2

## 2024-06-18 DIAGNOSIS — E66.01 CLASS 2 SEVERE OBESITY WITH SERIOUS COMORBIDITY AND BODY MASS INDEX (BMI) OF 37.0 TO 37.9 IN ADULT, UNSPECIFIED OBESITY TYPE (HCC): ICD-10-CM

## 2024-06-18 DIAGNOSIS — G89.4 CHRONIC PAIN SYNDROME: ICD-10-CM

## 2024-06-18 DIAGNOSIS — I89.0 LYMPHEDEMA, NOT ELSEWHERE CLASSIFIED: ICD-10-CM

## 2024-06-18 DIAGNOSIS — Z79.891 CHRONIC USE OF OPIATE DRUG FOR THERAPEUTIC PURPOSE: ICD-10-CM

## 2024-06-18 DIAGNOSIS — I89.0 LYMPHEDEMA OF BOTH LOWER EXTREMITIES: ICD-10-CM

## 2024-06-18 PROCEDURE — 99212 OFFICE O/P EST SF 10 MIN: CPT

## 2024-06-18 PROCEDURE — 3075F SYST BP GE 130 - 139MM HG: CPT | Performed by: FAMILY MEDICINE

## 2024-06-18 PROCEDURE — 3078F DIAST BP <80 MM HG: CPT | Performed by: FAMILY MEDICINE

## 2024-06-18 PROCEDURE — 99203 OFFICE O/P NEW LOW 30 MIN: CPT | Performed by: FAMILY MEDICINE

## 2024-06-18 RX ORDER — OXYCODONE HYDROCHLORIDE 5 MG/1
5 TABLET ORAL
COMMUNITY

## 2024-06-18 ASSESSMENT — ENCOUNTER SYMPTOMS
PALPITATIONS: 0
SPUTUM PRODUCTION: 0
CLAUDICATION: 0
ORTHOPNEA: 0
CHILLS: 0
SHORTNESS OF BREATH: 0
HEMOPTYSIS: 0
PND: 0
WHEEZING: 0
COUGH: 0
FEVER: 0

## 2024-06-18 ASSESSMENT — FIBROSIS 4 INDEX
FIB4 SCORE: 1.697056274847714058
FIB4 SCORE: 1.697056274847714058

## 2024-06-18 NOTE — PROGRESS NOTES
VASCULAR MEDICINE CLINIC - INITIAL VISIT  06/18/24     Dorcas Michael is a 68 y.o. female  who has been referred for vascular medicine evaluation and management   Referring provider: Manda Fierro A.P.R.*     Subjective      LYMPHEDEMA:  INITIAL VISIT HISTORY: seen 5/8/24 by PCP and noted to have chronic edema, s/p lymphedema PT in 9/2023 with good results and ongoing use of compression.   Started in 2000 with LLE has always been  Has ongoing use of compression wraps.    Pending for surg evaluation with [facility] [place] for advanced lymphatic surgical procedures and needs updated NM lymphoscint evaluation as part of prerequisites for eval and then may need referral.  Options include lymphovenous anastomosis, LN transfer, liposuction.   Continued severe edema L > R through entire calf.  No ulcerations or major skin coloration  changes.    Had prior eval with [facility] many yrs ago and they had recommended venous ablations but she opted to not complete.  Denies varicosities.     HTN: no prior dx or meds   Antithrombotic tx: no - no hx of bleeding    HLD: Stable, current treatment: no current medications  Dysglycemia: no    Patient Active Problem List    Diagnosis Date Noted    Morbid (severe) obesity due to excess calories (HCC) 03/13/2024    Body mass index (BMI) 37.0-37.9, adult 03/13/2024    Chronic pain syndrome 02/01/2024    Chronic use of opiate drug for therapeutic purpose 02/01/2024    Left lower quadrant pain 04/18/2023    Itchy eyes 02/23/2023    DDD (degenerative disc disease), lumbar 12/22/2022    Chronic midline low back pain without sciatica 12/22/2022    Right thigh pain 11/10/2021    Recurrent UTI 11/10/2021    Umbilical hernia without obstruction and without gangrene 05/20/2020    Achilles tendinitis of left lower extremity 05/20/2020    Primary arthrosis of first carpometacarpal joints, bilateral 08/22/2017    Obesity (BMI 30-39.9) 06/29/2017    Bilateral primary osteoarthritis of first  carpometacarpal joints 09/20/2016    Lymphedema of left lower extremity 06/09/2016    History of bilateral knee replacement 06/09/2016      Past Surgical History:   Procedure Laterality Date    VENTRAL HERNIA REPAIR LAPAROSCOPIC  2/16/2021    Procedure: REPAIR, HERNIA, VENTRAL, LAPAROSCOPIC - UMBILICAL;  Surgeon: Lilliana Mejia M.D.;  Location: SURGERY [facility];  Service: General    HERNIA REPAIR  02/16/2021    Umbilical     FINGER ARTHROPLASTY Left 8/22/2017    Procedure: FINGER ARTHROPLASTY - CARPAL METACARPAL;  Surgeon: Magnus Anderson M.D.;  Location: SURGERY [facility];  Service:     TENDON TRANSFER Left 8/22/2017    Procedure: TENDON TRANSFER - FLEXI CARPI RADIALIS;  Surgeon: Magnus Anderson M.D.;  Location: [facility];  Service:     CAPSULOTOMY  8/22/2017    Procedure: CAPSULOTOMY - METACARPAL PHALANGEAL JOINT CAPSULODESIS;  Surgeon: Magnus Anderson M.D.;  Location: [facility];  Service:     FINGER ARTHROPLASTY Right 9/20/2016    Procedure: FINGER ARTHROPLASTY - CARPAL METACARPAL;  Surgeon: Magnus Anderson M.D.;  Location: [facility];  Service:     TENDON TRANSFER Right 9/20/2016    Procedure: TENDON TRANSFER - FLEXI CARPI RADIALIS;  Surgeon: Magnus Anderson M.D.;  Location: [facility];  Service:     PIN INSERTION Right 9/20/2016    Procedure: PIN INSERTION - METACARPAL PHALANGEAL JOINT;  Surgeon: Magnus Anderson M.D.;  Location: [facility];  Service:     KNEE MANIPULATION  3/1/2010    Performed by OSGOOD, PATRICK J at [facility]    KNEE ARTHROPLASTY TOTAL  11/3/2009    Performed by OSGOOD, PATRICK J at [facility]    ACHILLES TENDON REP Right 5/2005    right    KNEE ARTHROPLASTY TOTAL Right 12/2003    right    KNEE ARTHROSCOPY Right 5/1998    right    RECTOCELE REPAIR  6/1990    repair cystocele    KNEE ARTHROTOMY Left 1973    left lateral meniscectomy    KNEE ARTHROTOMY Right 1971    right  lateral meniscectomy      Family History   Problem Relation Age of Onset    Heart Disease Other     Other Mother         MS    Thyroid Daughter     GI Disease Other         Crohns    Thyroid Sister     Hyperlipidemia Sister     Hyperlipidemia Father       Current Outpatient Medications on File Prior to Visit   Medication Sig Dispense Refill    oxyCODONE immediate-release (ROXICODONE) 5 MG Tab Take 5 mg by mouth 1 time a day as needed for Severe Pain.      celecoxib (CELEBREX) 200 MG Cap TAKE 1 CAPSULE BY MOUTH EVERY DAY 90 Capsule 0    Probiotic Product (PROBIOTIC BLEND PO) 1capsule daily      nystatin (MYCOSTATIN) 968738 UNIT/GM Cream topical cream APPLY 1 GM TO AFFECTED AREA(S) 2 TIMES A DAY. 90 g 2    B Complex Vitamins (VITAMIN B COMPLEX PO) every day.      Cholecalciferol (VITAMIN D3) 5000 UNITS Cap Take 1 Capsule by mouth every day.      FIBER PO Take  by mouth every day. 6 caps      diphenhydrAMINE (BENADRYL) 25 MG Tab Take 1 Tablet by mouth every 6 hours as needed for Sleep.      ondansetron (ZOFRAN) 4 MG Tab tablet Take 1 Tablet by mouth every four hours as needed for Nausea/Vomiting. 20 Tablet 0     No current facility-administered medications on file prior to visit.      No Known Allergies     Social History     Tobacco Use    Smoking status: Never    Smokeless tobacco: Never   Vaping Use    Vaping status: Never Used   Substance Use Topics    Alcohol use: Yes     Alcohol/week: 0.6 - 2.4 oz     Types: 1 - 4 Glasses of wine per week     Comment: 1-2 per day    Drug use: No     DIET AND EXERCISE:  Weight Change:stable  Diet: common adult  Exercise: sporadic irregular exercise     Review of Systems   Constitutional:  Negative for chills, fever and malaise/fatigue.   Respiratory:  Negative for cough, hemoptysis, sputum production, shortness of breath and wheezing.    Cardiovascular:  Positive for leg swelling (L > R swleling). Negative for chest pain, palpitations, orthopnea, claudication and PND.         "  Objective    Vitals:    24 1349 24 1352   BP: (!) 145/84 135/77   BP Location: Left arm Left arm   Patient Position: Sitting Sitting   BP Cuff Size: Large adult Large adult   Pulse: 65 65   Weight: 116 kg (255 lb) 116 kg (255 lb)   Height: 1.753 m (5' 9\") 1.753 m (5' 9\")      BP Readings from Last 4 Encounters:   24 135/77   24 132/80   24 122/80   24 130/82      Body mass index is 37.66 kg/m².   Wt Readings from Last 4 Encounters:   24 116 kg (255 lb)   24 115 kg (253 lb)   24 116 kg (255 lb)   24 116 kg (255 lb)      Physical Exam  Constitutional:       General: She is not in acute distress.     Appearance: Normal appearance. She is not diaphoretic.   HENT:      Head: Normocephalic and atraumatic.   Eyes:      Conjunctiva/sclera: Conjunctivae normal.   Cardiovascular:      Rate and Rhythm: Normal rate and regular rhythm.      Pulses:           Carotid pulses are 2+ on the right side and 2+ on the left side.       Dorsalis pedis pulses are 2+ on the right side and 2+ on the left side.        Posterior tibial pulses are 2+ on the right side and 2+ on the left side.      Heart sounds: Normal heart sounds.      Comments: Stemmer's positive bilat   No abd wall varicosities     Spider telangectasia:       RLE:  None      LLE: none   Varicosities:           RLE: none      LLE: none   Corona phlebectatica:      RLE:  None        LLE:  None   Cording:         RLE:  None     LLE: None     No stasis dermatitis or pigmentation  No lipodermatosclerosis or atrophe cricket  No healed or current VLUs    Pulmonary:      Effort: Pulmonary effort is normal.      Breath sounds: Normal breath sounds.   Musculoskeletal:      Right lower leg: 3+ Edema present.      Left lower le+ Edema present.   Skin:     General: Skin is warm and dry.   Neurological:      Mental Status: She is alert and oriented to person, place, and time.      Cranial Nerves: No cranial nerve deficit. " "     Gait: Gait is intact.   Psychiatric:         Mood and Affect: Mood and affect normal.          DATA REVIEW    Lab Results   Component Value Date/Time    CHOLSTRLTOT 176 01/29/2024 06:45 AM    LDL 92 01/29/2024 06:45 AM    HDL 76 01/29/2024 06:45 AM    TRIGLYCERIDE 42 01/29/2024 06:45 AM       No results found for: \"LIPOPROTA\"   No results found for: \"APOB\"   No results found for: \"CRPHIGHSEN\"       Lab Results   Component Value Date/Time    SODIUM 138 01/29/2024 06:45 AM    POTASSIUM 4.2 01/29/2024 06:45 AM    CHLORIDE 102 01/29/2024 06:45 AM    CO2 23 01/29/2024 06:45 AM    GLUCOSE 101 (H) 01/29/2024 06:45 AM    BUN 15 01/29/2024 06:45 AM    CREATININE 0.56 01/29/2024 06:45 AM     Lab Results   Component Value Date/Time    ALKPHOSPHAT 63 01/29/2024 06:45 AM    ASTSGOT 24 01/29/2024 06:45 AM    ALTSGPT 18 01/29/2024 06:45 AM    TBILIRUBIN 1.0 01/29/2024 06:45 AM       Lab Results   Component Value Date/Time    HBA1C 5.5 01/29/2024 06:45 AM       No results found for: \"MICROALBCALC\", \"MALBCRT\", \"MALBEXCR\", \"JCZULF15\", \"MICROALBUR\", \"MICRALB\", \"UMICROALBUM\", \"MICROALBTIM\"      CV IMAGING:    MRA pelvis 2019  FINDINGS:  The abdominal aorta is somewhat tortuous. The iliofemoral arteries are tortuous. No significant narrowing is appreciated on coronal MRA images.   No significant narrowing is identified in the left common iliac vein deep to the left common iliac artery. No significant collateral formation.  IMPRESSION:   1.  No significant venous compression to suggest May Thurner syndrome.   2.  Diverticulosis.   3.  Small umbilical hernia containing fat.      VASCULAR PROCEDURES:  None          Medical Decision Making:  Today's Assessment / Status / Plan:     1. Lymphedema of both lower extremities        2. Class 2 severe obesity with serious comorbidity and body mass index (BMI) of 37.0 to 37.9 in adult, unspecified obesity type (HCC)        3. Chronic pain syndrome        4. Chronic use of opiate drug for " "therapeutic purpose        5. Lymphedema, not elsewhere classified  NM-LYMPHANGIOGRAM    CANCELED: NM-LYMPHOSCINTIGRAPHY         Etiology of Established CVD if Present:   none     LYMPHEDEMA. 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 N/a  Contributing factors: n/a   Plan:   - monitor annual office-based BP and/or intermittent at home BP, report >130/80    - continue healthy lifestyle  Medications: none      LIPID MANAGEMENT  Qualifies for Statin Therapy Based on 2018 ACC/AHA Guidelines: yes, Primary Prevention - 40-74yo, LDLc >70, <190 w/o DM  10-yr ASCVD risk score: The 10-year ASCVD risk score (Lien GARBER, et al., 2019) is: 8.4%, 7.5 - <20% \"intermediate risk\"   Major ASCVD events: None    High-risk conditions: None   Risk-enhancers: None  Currently on Statin: No  Tx goals: LDL-C <100   At goal? yes  Plan:   - continue lifestyle mgmt, no meds at this time      GLYCEMIC MANAGEMENT Prediabetic, IFG only  Lab Results   Component Value Date    HBA1C 5.5 01/29/2024    Plan:  - continue healthy diet, weight reduction, daily physical activity   - consider metformin initiation up to 850mg BID if A1c 6.2+ in future to reduce T2D progression  - monitor labs Q6-12mo     ANTITHROMBOTIC THERAPY   Not indicated     LIFESTYLE INTERVENTIONS  TOBACCO: continued complete avoidance of all tobacco products   PHYSICAL ACTIVITY: >150min/week of mod-intensity activity or as much as tolerated  NUTRITION: Mediterranean, reduce Na to 1500mg/day, and Weight reduction - reduce " caloric intake by 300 - 500 kcal/day to achieve 1-2lb weight loss per week    ETOH: limit to 1 or less standard drinks/day  WT MGMT: - focus on 5-7% reduction over time , 1kg loss = reduced 1 mmHg    OTHER:  none     Studies to Be Obtained: NM lymphoscintigraphy of BLEs  Labs to Be Obtained: none     Follow up in: 6 weeks    Uche Aragon M.D.   [facility]  [facility]  [phone]

## 2024-06-26 ENCOUNTER — HOSPITAL ENCOUNTER (OUTPATIENT)
Dept: RADIOLOGY | Facility: MEDICAL CENTER | Age: 69
End: 2024-06-26
Attending: FAMILY MEDICINE
Payer: MEDICARE

## 2024-06-26 DIAGNOSIS — I89.0 LYMPHEDEMA, NOT ELSEWHERE CLASSIFIED: ICD-10-CM

## 2024-06-26 PROCEDURE — A9541 TC99M SULFUR COLLOID: HCPCS

## 2024-07-02 ENCOUNTER — TELEPHONE (OUTPATIENT)
Dept: VASCULAR LAB | Facility: MEDICAL CENTER | Age: 69
End: 2024-07-02
Payer: MEDICARE

## 2024-07-12 ENCOUNTER — OFFICE VISIT (OUTPATIENT)
Dept: VASCULAR LAB | Facility: MEDICAL CENTER | Age: 69
End: 2024-07-12
Attending: FAMILY MEDICINE
Payer: MEDICARE

## 2024-07-12 VITALS
DIASTOLIC BLOOD PRESSURE: 77 MMHG | HEART RATE: 64 BPM | SYSTOLIC BLOOD PRESSURE: 135 MMHG | WEIGHT: 248 LBS | HEIGHT: 69 IN | BODY MASS INDEX: 36.73 KG/M2

## 2024-07-12 DIAGNOSIS — M79.605 LEFT LEG PAIN: ICD-10-CM

## 2024-07-12 DIAGNOSIS — G89.4 CHRONIC PAIN SYNDROME: ICD-10-CM

## 2024-07-12 DIAGNOSIS — Z79.891 CHRONIC USE OF OPIATE DRUG FOR THERAPEUTIC PURPOSE: ICD-10-CM

## 2024-07-12 DIAGNOSIS — I89.0 LYMPHEDEMA OF LEFT LOWER EXTREMITY: ICD-10-CM

## 2024-07-12 DIAGNOSIS — E66.01 CLASS 2 SEVERE OBESITY WITH SERIOUS COMORBIDITY AND BODY MASS INDEX (BMI) OF 36.0 TO 36.9 IN ADULT, UNSPECIFIED OBESITY TYPE (HCC): ICD-10-CM

## 2024-07-12 PROBLEM — E66.812 CLASS 2 SEVERE OBESITY WITH SERIOUS COMORBIDITY AND BODY MASS INDEX (BMI) OF 36.0 TO 36.9 IN ADULT, UNSPECIFIED OBESITY TYPE (HCC): Status: ACTIVE | Noted: 2024-03-13

## 2024-07-12 PROCEDURE — 3078F DIAST BP <80 MM HG: CPT | Performed by: FAMILY MEDICINE

## 2024-07-12 PROCEDURE — 99213 OFFICE O/P EST LOW 20 MIN: CPT | Performed by: FAMILY MEDICINE

## 2024-07-12 PROCEDURE — 3075F SYST BP GE 130 - 139MM HG: CPT | Performed by: FAMILY MEDICINE

## 2024-07-12 PROCEDURE — 99212 OFFICE O/P EST SF 10 MIN: CPT

## 2024-07-12 ASSESSMENT — ENCOUNTER SYMPTOMS
PND: 0
ORTHOPNEA: 0
PALPITATIONS: 0
SHORTNESS OF BREATH: 0
FEVER: 0
COUGH: 0
WHEEZING: 0
HEMOPTYSIS: 0
CLAUDICATION: 0
CHILLS: 0
SPUTUM PRODUCTION: 0

## 2024-07-12 ASSESSMENT — FIBROSIS 4 INDEX: FIB4 SCORE: 1.697056274847714058

## 2024-07-23 ENCOUNTER — HOSPITAL ENCOUNTER (OUTPATIENT)
Facility: MEDICAL CENTER | Age: 69
End: 2024-07-23
Payer: MEDICARE

## 2024-07-23 ENCOUNTER — OFFICE VISIT (OUTPATIENT)
Dept: MEDICAL GROUP | Facility: IMAGING CENTER | Age: 69
End: 2024-07-23
Payer: MEDICARE

## 2024-07-23 VITALS
SYSTOLIC BLOOD PRESSURE: 118 MMHG | BODY MASS INDEX: 37.86 KG/M2 | DIASTOLIC BLOOD PRESSURE: 82 MMHG | OXYGEN SATURATION: 95 % | HEART RATE: 65 BPM | RESPIRATION RATE: 16 BRPM | WEIGHT: 255.6 LBS | HEIGHT: 69 IN | TEMPERATURE: 97.2 F

## 2024-07-23 DIAGNOSIS — G89.4 CHRONIC PAIN SYNDROME: ICD-10-CM

## 2024-07-23 DIAGNOSIS — I89.0 LYMPHEDEMA OF BOTH LOWER EXTREMITIES: ICD-10-CM

## 2024-07-23 DIAGNOSIS — Z79.891 CHRONIC USE OF OPIATE FOR THERAPEUTIC PURPOSE: ICD-10-CM

## 2024-07-23 PROCEDURE — 80307 DRUG TEST PRSMV CHEM ANLYZR: CPT

## 2024-07-23 PROCEDURE — 3074F SYST BP LT 130 MM HG: CPT

## 2024-07-23 PROCEDURE — 3079F DIAST BP 80-89 MM HG: CPT

## 2024-07-23 PROCEDURE — 99213 OFFICE O/P EST LOW 20 MIN: CPT

## 2024-07-23 RX ORDER — OXYCODONE HYDROCHLORIDE 5 MG/1
5 TABLET ORAL
Qty: 30 TABLET | Refills: 0 | Status: SHIPPED | OUTPATIENT
Start: 2024-07-23 | End: 2024-08-22

## 2024-07-23 ASSESSMENT — FIBROSIS 4 INDEX: FIB4 SCORE: 1.697056274847714058

## 2024-07-25 LAB
AMPHET CTO UR CFM-MCNC: NEGATIVE NG/ML
BARBITURATES CTO UR CFM-MCNC: NEGATIVE NG/ML
BENZODIAZ CTO UR CFM-MCNC: NEGATIVE NG/ML
CANNABINOIDS CTO UR CFM-MCNC: NEGATIVE NG/ML
COCAINE CTO UR CFM-MCNC: NEGATIVE NG/ML
CREAT UR-MCNC: 20.4 MG/DL (ref 20–400)
DRUG COMMENT 753798: NORMAL
METHADONE CTO UR CFM-MCNC: NEGATIVE NG/ML
OPIATES CTO UR CFM-MCNC: NEGATIVE NG/ML
PCP CTO UR CFM-MCNC: NEGATIVE NG/ML
PROPOXYPH CTO UR CFM-MCNC: NEGATIVE NG/ML

## 2024-09-10 DIAGNOSIS — M18.0 PRIMARY ARTHROSIS OF FIRST CARPOMETACARPAL JOINTS, BILATERAL: ICD-10-CM

## 2024-09-10 RX ORDER — CELECOXIB 200 MG/1
200 CAPSULE ORAL
Qty: 90 CAPSULE | Refills: 0 | Status: SHIPPED | OUTPATIENT
Start: 2024-09-10

## 2024-09-10 NOTE — TELEPHONE ENCOUNTER
Received request via: Pharmacy    Was the patient seen in the last year in this department? Yes    Does the patient have an active prescription (recently filled or refills available) for medication(s) requested? No    Pharmacy Name: [id number]    Does the patient have care home Plus and need 100-day supply? (This applies to ALL medications) Patient does have SCP

## 2024-10-08 ENCOUNTER — OFFICE VISIT (OUTPATIENT)
Dept: MEDICAL GROUP | Facility: IMAGING CENTER | Age: 69
End: 2024-10-08
Payer: MEDICARE

## 2024-10-08 VITALS
OXYGEN SATURATION: 98 % | DIASTOLIC BLOOD PRESSURE: 80 MMHG | HEART RATE: 68 BPM | TEMPERATURE: 97.2 F | HEIGHT: 69 IN | BODY MASS INDEX: 37.92 KG/M2 | WEIGHT: 256 LBS | SYSTOLIC BLOOD PRESSURE: 140 MMHG

## 2024-10-08 DIAGNOSIS — G89.4 CHRONIC PAIN SYNDROME: ICD-10-CM

## 2024-10-08 DIAGNOSIS — I89.0 LYMPHEDEMA OF LEFT LOWER EXTREMITY: ICD-10-CM

## 2024-10-08 DIAGNOSIS — Z13.820 OSTEOPOROSIS SCREENING: ICD-10-CM

## 2024-10-08 DIAGNOSIS — Z79.891 CHRONIC USE OF OPIATE DRUG FOR THERAPEUTIC PURPOSE: ICD-10-CM

## 2024-10-08 DIAGNOSIS — Z78.0 POSTMENOPAUSAL: ICD-10-CM

## 2024-10-08 DIAGNOSIS — R03.0 ELEVATED BLOOD PRESSURE READING: ICD-10-CM

## 2024-10-08 PROCEDURE — 3077F SYST BP >= 140 MM HG: CPT

## 2024-10-08 PROCEDURE — 99214 OFFICE O/P EST MOD 30 MIN: CPT

## 2024-10-08 PROCEDURE — 3079F DIAST BP 80-89 MM HG: CPT

## 2024-10-08 RX ORDER — OXYCODONE AND ACETAMINOPHEN 5; 325 MG/1; MG/1
1 TABLET ORAL EVERY 4 HOURS PRN
Qty: 30 TABLET | Refills: 0 | Status: SHIPPED | OUTPATIENT
Start: 2024-10-08 | End: 2024-11-07

## 2024-10-08 RX ORDER — OXYCODONE AND ACETAMINOPHEN 5; 325 MG/1; MG/1
1 TABLET ORAL EVERY 4 HOURS PRN
COMMUNITY
End: 2024-10-08 | Stop reason: SDUPTHER

## 2024-10-08 RX ORDER — HYDROCHLOROTHIAZIDE 25 MG/1
12.5 TABLET ORAL DAILY
Qty: 30 TABLET | Refills: 1 | Status: SHIPPED | OUTPATIENT
Start: 2024-10-08

## 2024-10-08 ASSESSMENT — FIBROSIS 4 INDEX: FIB4 SCORE: 1.72

## 2024-10-30 ENCOUNTER — APPOINTMENT (OUTPATIENT)
Dept: MEDICAL GROUP | Facility: IMAGING CENTER | Age: 69
End: 2024-10-30
Payer: MEDICARE

## 2024-11-26 ENCOUNTER — HOSPITAL ENCOUNTER (OUTPATIENT)
Dept: RADIOLOGY | Facility: MEDICAL CENTER | Age: 69
End: 2024-11-26
Payer: MEDICARE

## 2024-11-26 DIAGNOSIS — Z78.0 POSTMENOPAUSAL: ICD-10-CM

## 2024-11-26 DIAGNOSIS — Z13.820 OSTEOPOROSIS SCREENING: ICD-10-CM

## 2024-11-26 PROCEDURE — 77080 DXA BONE DENSITY AXIAL: CPT

## 2024-12-11 DIAGNOSIS — M18.0 PRIMARY ARTHROSIS OF FIRST CARPOMETACARPAL JOINTS, BILATERAL: ICD-10-CM

## 2024-12-11 RX ORDER — CELECOXIB 200 MG/1
200 CAPSULE ORAL
Qty: 90 CAPSULE | Refills: 0 | Status: SHIPPED | OUTPATIENT
Start: 2024-12-11

## 2024-12-11 NOTE — TELEPHONE ENCOUNTER
Received request via: Pharmacy    Was the patient seen in the last year in this department? Yes    Does the patient have an active prescription (recently filled or refills available) for medication(s) requested? No    Pharmacy Name: [id number]    Does the patient have [facility] Plus and need 100-day supply? (This applies to ALL medications) Yes, quantity updated to 100 days

## 2024-12-16 ENCOUNTER — OFFICE VISIT (OUTPATIENT)
Dept: MEDICAL GROUP | Facility: IMAGING CENTER | Age: 69
End: 2024-12-16
Payer: MEDICARE

## 2024-12-16 ENCOUNTER — HOSPITAL ENCOUNTER (OUTPATIENT)
Dept: LAB | Facility: MEDICAL CENTER | Age: 69
End: 2024-12-16
Attending: PHYSICIAN ASSISTANT
Payer: MEDICARE

## 2024-12-16 VITALS
SYSTOLIC BLOOD PRESSURE: 126 MMHG | HEART RATE: 93 BPM | TEMPERATURE: 96.6 F | RESPIRATION RATE: 16 BRPM | HEIGHT: 69 IN | DIASTOLIC BLOOD PRESSURE: 78 MMHG | WEIGHT: 254 LBS | OXYGEN SATURATION: 96 % | BODY MASS INDEX: 37.62 KG/M2

## 2024-12-16 DIAGNOSIS — R42 DIZZINESS: ICD-10-CM

## 2024-12-16 DIAGNOSIS — R01.1 HEART MURMUR: ICD-10-CM

## 2024-12-16 DIAGNOSIS — K59.09 OTHER CONSTIPATION: ICD-10-CM

## 2024-12-16 DIAGNOSIS — I10 PRIMARY HYPERTENSION: ICD-10-CM

## 2024-12-16 DIAGNOSIS — Z13.6 SCREENING FOR CARDIOVASCULAR CONDITION: ICD-10-CM

## 2024-12-16 PROBLEM — R10.32 LEFT LOWER QUADRANT PAIN: Status: RESOLVED | Noted: 2023-04-18 | Resolved: 2024-12-16

## 2024-12-16 PROBLEM — M79.651 RIGHT THIGH PAIN: Status: RESOLVED | Noted: 2021-11-10 | Resolved: 2024-12-16

## 2024-12-16 PROBLEM — R09.89 LABILE BLOOD PRESSURE: Status: ACTIVE | Noted: 2024-12-16

## 2024-12-16 PROBLEM — H57.9 ITCHY EYES: Status: RESOLVED | Noted: 2023-02-23 | Resolved: 2024-12-16

## 2024-12-16 LAB
ALBUMIN SERPL BCP-MCNC: 4 G/DL (ref 3.2–4.9)
ALBUMIN/GLOB SERPL: 1.3 G/DL
ALP SERPL-CCNC: 96 U/L (ref 30–99)
ALT SERPL-CCNC: 23 U/L (ref 2–50)
ANION GAP SERPL CALC-SCNC: 11 MMOL/L (ref 7–16)
AST SERPL-CCNC: 22 U/L (ref 12–45)
BASOPHILS # BLD AUTO: 0.7 % (ref 0–1.8)
BASOPHILS # BLD: 0.06 K/UL (ref 0–0.12)
BILIRUB SERPL-MCNC: 0.7 MG/DL (ref 0.1–1.5)
BUN SERPL-MCNC: 13 MG/DL (ref 8–22)
CALCIUM ALBUM COR SERPL-MCNC: 9.8 MG/DL (ref 8.5–10.5)
CALCIUM SERPL-MCNC: 9.8 MG/DL (ref 8.5–10.5)
CHLORIDE SERPL-SCNC: 104 MMOL/L (ref 96–112)
CO2 SERPL-SCNC: 25 MMOL/L (ref 20–33)
CREAT SERPL-MCNC: 0.69 MG/DL (ref 0.5–1.4)
EOSINOPHIL # BLD AUTO: 0.15 K/UL (ref 0–0.51)
EOSINOPHIL NFR BLD: 1.6 % (ref 0–6.9)
ERYTHROCYTE [DISTWIDTH] IN BLOOD BY AUTOMATED COUNT: 47.8 FL (ref 35.9–50)
GFR SERPLBLD CREATININE-BSD FMLA CKD-EPI: 93 ML/MIN/1.73 M 2
GLOBULIN SER CALC-MCNC: 3 G/DL (ref 1.9–3.5)
GLUCOSE SERPL-MCNC: 91 MG/DL (ref 65–99)
HCT VFR BLD AUTO: 42.9 % (ref 37–47)
HGB BLD-MCNC: 13.7 G/DL (ref 12–16)
IMM GRANULOCYTES # BLD AUTO: 0.06 K/UL (ref 0–0.11)
IMM GRANULOCYTES NFR BLD AUTO: 0.7 % (ref 0–0.9)
LYMPHOCYTES # BLD AUTO: 1.78 K/UL (ref 1–4.8)
LYMPHOCYTES NFR BLD: 19.3 % (ref 22–41)
MCH RBC QN AUTO: 30.8 PG (ref 27–33)
MCHC RBC AUTO-ENTMCNC: 31.9 G/DL (ref 32.2–35.5)
MCV RBC AUTO: 96.4 FL (ref 81.4–97.8)
MONOCYTES # BLD AUTO: 0.62 K/UL (ref 0–0.85)
MONOCYTES NFR BLD AUTO: 6.7 % (ref 0–13.4)
NEUTROPHILS # BLD AUTO: 6.54 K/UL (ref 1.82–7.42)
NEUTROPHILS NFR BLD: 71 % (ref 44–72)
NRBC # BLD AUTO: 0 K/UL
NRBC BLD-RTO: 0 /100 WBC (ref 0–0.2)
PLATELET # BLD AUTO: 317 K/UL (ref 164–446)
PMV BLD AUTO: 9.8 FL (ref 9–12.9)
POTASSIUM SERPL-SCNC: 4 MMOL/L (ref 3.6–5.5)
PROT SERPL-MCNC: 7 G/DL (ref 6–8.2)
RBC # BLD AUTO: 4.45 M/UL (ref 4.2–5.4)
SODIUM SERPL-SCNC: 140 MMOL/L (ref 135–145)
TSH SERPL DL<=0.005 MIU/L-ACNC: 1.96 UIU/ML (ref 0.38–5.33)
WBC # BLD AUTO: 9.2 K/UL (ref 4.8–10.8)

## 2024-12-16 PROCEDURE — 3074F SYST BP LT 130 MM HG: CPT | Performed by: PHYSICIAN ASSISTANT

## 2024-12-16 PROCEDURE — 80053 COMPREHEN METABOLIC PANEL: CPT

## 2024-12-16 PROCEDURE — 99214 OFFICE O/P EST MOD 30 MIN: CPT | Mod: 25 | Performed by: PHYSICIAN ASSISTANT

## 2024-12-16 PROCEDURE — 85025 COMPLETE CBC W/AUTO DIFF WBC: CPT

## 2024-12-16 PROCEDURE — 36415 COLL VENOUS BLD VENIPUNCTURE: CPT

## 2024-12-16 PROCEDURE — 84443 ASSAY THYROID STIM HORMONE: CPT

## 2024-12-16 PROCEDURE — 1126F AMNT PAIN NOTED NONE PRSNT: CPT | Performed by: PHYSICIAN ASSISTANT

## 2024-12-16 PROCEDURE — 3078F DIAST BP <80 MM HG: CPT | Performed by: PHYSICIAN ASSISTANT

## 2024-12-16 RX ORDER — TELMISARTAN 20 MG/1
20 TABLET ORAL DAILY
Qty: 100 TABLET | Refills: 0 | Status: SHIPPED | OUTPATIENT
Start: 2024-12-16

## 2024-12-16 ASSESSMENT — FIBROSIS 4 INDEX: FIB4 SCORE: 1.72

## 2024-12-16 ASSESSMENT — PAIN SCALES - GENERAL: PAINLEVEL_OUTOF10: NO PAIN

## 2024-12-16 NOTE — ASSESSMENT & PLAN NOTE
Patient admits to labile blood pressure readings at home.  She was prescribed hydrochlorothiazide and tried it for three days last month, but was having dizziness, leg cramping, and headaches. She states her systolic blood pressure was as high as 180 while she was taking it.  Blood pressure in the 140s to 150s systolic with being off the medication.  No sob, chest pain, palpitations, vision changes. No decongestants or stimulants.

## 2024-12-16 NOTE — PROGRESS NOTES
Subjective:     CC:   Chief Complaint   Patient presents with    Constipation     X1week, OTC stool softeners, milk of magnesium and suppositories with some relief      Medication Management     hydroCHLOROthiazide stopped taking few weeks ago, causing leg cramps and dizzy        HPI:   Dorcas presents today to discuss:    Other constipation  Pt admits to hard frequent small stools x 7 days. Has tried a stool softener, milk of mag, and a suppository over the past week. Had a large loose BM last night.  No recent opioid use or new supplements.  No abdominal pain, nausea, vomiting.  Last colonoscopy was 1/28/2021 without polyps.    Labile blood pressure  Patient admits to labile blood pressure readings at home.  She was prescribed hydrochlorothiazide and tried it for three days last month, but was having dizziness, leg cramping, and headaches. She states her systolic blood pressure was as high as 180 while she was taking it.  Blood pressure in the 140s to 150s systolic with being off the medication.  No sob, chest pain, palpitations, vision changes. No decongestants or stimulants.       Past Medical History:   Diagnosis Date    Arthritis     both hands    Lymphedema 1995    bilateral legs    Pain     left knee 4/10    Pain     hands, feet    Pain     left hand     Family History   Problem Relation Age of Onset    Heart Disease Other     Other Mother         MS    Thyroid Daughter     GI Disease Other         Crohns    Thyroid Sister     Hyperlipidemia Sister     Hyperlipidemia Father      Past Surgical History:   Procedure Laterality Date    VENTRAL HERNIA REPAIR LAPAROSCOPIC  2/16/2021    Procedure: REPAIR, HERNIA, VENTRAL, LAPAROSCOPIC - UMBILICAL;  Surgeon: Lilliana Mejia M.D.;  Location: SURGERY SAME DAY [facility];  Service: General    HERNIA REPAIR  02/16/2021    Umbilical     FINGER ARTHROPLASTY Left 8/22/2017    Procedure: FINGER ARTHROPLASTY - CARPAL METACARPAL;  Surgeon: Magnus Anderson M.D.;  Location:  SURGERY [facility];  Service:     TENDON TRANSFER Left 8/22/2017    Procedure: TENDON TRANSFER - FLEXI CARPI RADIALIS;  Surgeon: Magnus Anderson M.D.;  Location: [facility];  Service:     CAPSULOTOMY  8/22/2017    Procedure: CAPSULOTOMY - METACARPAL PHALANGEAL JOINT CAPSULODESIS;  Surgeon: Magnus Anderson M.D.;  Location: [facility];  Service:     FINGER ARTHROPLASTY Right 9/20/2016    Procedure: FINGER ARTHROPLASTY - CARPAL METACARPAL;  Surgeon: Magnus Anderson M.D.;  Location: [facility];  Service:     TENDON TRANSFER Right 9/20/2016    Procedure: TENDON TRANSFER - FLEXI CARPI RADIALIS;  Surgeon: Magnus Anderson M.D.;  Location: [facility];  Service:     PIN INSERTION Right 9/20/2016    Procedure: PIN INSERTION - METACARPAL PHALANGEAL JOINT;  Surgeon: Magnus Anderson M.D.;  Location: [facility];  Service:     KNEE MANIPULATION  3/1/2010    Performed by OSGOOD, PATRICK J at [facility]    KNEE ARTHROPLASTY TOTAL  11/3/2009    Performed by OSGOOD, PATRICK J at [facility]    ACHILLES TENDON REP Right 5/2005    right    KNEE ARTHROPLASTY TOTAL Right 12/2003    right    KNEE ARTHROSCOPY Right 5/1998    right    RECTOCELE REPAIR  6/1990    repair cystocele    KNEE ARTHROTOMY Left 1973    left lateral meniscectomy    KNEE ARTHROTOMY Right 1971    right lateral meniscectomy     Social History     Tobacco Use    Smoking status: Never    Smokeless tobacco: Never   Vaping Use    Vaping status: Never Used   Substance Use Topics    Alcohol use: Yes     Alcohol/week: 0.6 - 2.4 oz     Types: 1 - 4 Glasses of wine per week     Comment: 1-2 per day    Drug use: No     Social History     Social History Narrative    Not on file     Current Outpatient Medications Ordered in Epic   Medication Sig Dispense Refill    telmisartan (MICARDIS) 20 MG tablet Take 1 Tablet by mouth every day. 100 Tablet 0    celecoxib (CELEBREX) 200 MG Cap  "TAKE 1 CAPSULE BY MOUTH EVERY DAY 90 Capsule 0    NON SPECIFIED Take 1,000 mg by mouth every day. DIOSMIN      Probiotic Product (PROBIOTIC BLEND PO) 1capsule daily      nystatin (MYCOSTATIN) 750026 UNIT/GM Cream topical cream APPLY 1 GM TO AFFECTED AREA(S) 2 TIMES A DAY. 90 g 2    B Complex Vitamins (VITAMIN B COMPLEX PO) every day.      Cholecalciferol (VITAMIN D3) 5000 UNITS Cap Take 1 Capsule by mouth every day.      FIBER PO Take  by mouth every day. 6 caps      diphenhydrAMINE (BENADRYL) 25 MG Tab Take 1 Tablet by mouth every 6 hours as needed for Sleep.       No current Epic-ordered facility-administered medications on file.     Patient has no known allergies.    PMH/PSH/FH/Social history reviewed.  Vaccinations discussed.  Previous records and labs reviewed. Discussed age appropriate anticipatory guidance.    ROS: see hpi  Gen: no fevers/chills  Pulm: no sob, no cough  CV: no chest pain, no palpitations, no edema  GI: no nausea/vomiting, no diarrhea  Skin: no rash    Objective:   Exam:  /78 (BP Location: Right arm, Patient Position: Sitting, BP Cuff Size: Large adult)   Pulse 93   Temp 35.9 °C (96.6 °F) (Temporal)   Resp 16   Ht 1.753 m (5' 9\")   Wt 115 kg (254 lb)   SpO2 96%   BMI 37.51 kg/m²    Body mass index is 37.51 kg/m².    Gen: Alert and oriented, No apparent distress.  HEENT: Head atraumatic, normocephalic. Pupils equal and round.  Neck: Neck is supple without lymphadenopathy.   Lungs: Normal effort, CTA bilaterally, no wheezes, rhonchi, or rales  CV: Regular rate and rhythm. No rubs or gallops. 2/6 systolic murmur.  ABD: +BS. Non-tender, non-distended. No rebound, rigidity, or guarding.  Ext: No clubbing, cyanosis. B/L LE lymphedema.    EKG Interpretation   Ordered and interpreted by Krista Onofre PA-C  Rhythm: normal sinus   Rate: normal   Axis: normal   Ectopy: none   Conduction: normal   ST Segments: no acute change   T Waves: no acute change   Q Waves: none   Clinical Impression: no " acute changes and normal EKG      Assessment & Plan:     69 y.o. female with the following -     1. Primary hypertension  Chronic, uncontrolled.  Will trial low-dose ARB.  Check echocardiogram due to murmur and labile blood pressure. Recommend DASH diet, exercise as tolerated. Monitor Blood Pressure at home and report any consistent readings above >140/90. Seek medical help/ER if chest pain, palpitations, shortness of breath, headache, dizziness.   - CBC WITH DIFFERENTIAL; Future  - TSH WITH REFLEX TO FT4; Future  - Comp Metabolic Panel; Future  - EKG  - EC-ECHOCARDIOGRAM COMPLETE W/O CONT; Future  - telmisartan (MICARDIS) 20 MG tablet; Take 1 Tablet by mouth every day.  Dispense: 100 Tablet; Refill: 0    2. Heart murmur  - EKG  - EC-ECHOCARDIOGRAM COMPLETE W/O CONT; Future    3. Dizziness  One episode during high blood pressure reading.  No current symptoms.  - CBC WITH DIFFERENTIAL; Future  - TSH WITH REFLEX TO FT4; Future  - Comp Metabolic Panel; Future  - EKG  - EC-ECHOCARDIOGRAM COMPLETE W/O CONT; Future    4. Other constipation  Increase fiber and fluids, MiraLAX 17 g daily.  Will check abdominal x-ray.  GI evaluation if persist.  - CBC WITH DIFFERENTIAL; Future  - TSH WITH REFLEX TO FT4; Future  - Comp Metabolic Panel; Future  - [id number] VIEW; Future    5. Screening for cardiovascular condition  - EKG  - EC-ECHOCARDIOGRAM COMPLETE W/O CONT; Future    Return in about 2 weeks (around 12/30/2024) for Blood pressure check.    Krista Onofre PA-C ([facility])  Physician Assistant Certified  [facility]      Please note that this dictation was created using voice recognition software. I have made every reasonable attempt to correct obvious errors, but I expect that there are errors of grammar and possibly content that I did not discover before finalizing the note.

## 2024-12-16 NOTE — ASSESSMENT & PLAN NOTE
Pt admits to hard frequent small stools x 7 days. Has tried a stool softener, milk of mag, and a suppository over the past week. Had a large loose BM last night.  No recent opioid use or new supplements.  No abdominal pain, nausea, vomiting.  Last colonoscopy was 1/28/2021 without polyps.

## 2024-12-16 NOTE — PATIENT INSTRUCTIONS
It was a pleasure meeting with you today at [facility]!    Your medical history/records and medications were reviewed today.     UPDATE on MyChart Results: If you have blood work, and/or imaging studies, or any other test or procedure completed, you will have access to results as soon as they become available in MyChart. Recently, these results will be available for review at the same time that your provider is able to see results!    This will likely mean you will see a result before your provider has had a chance to review and discuss with you.  Some results or care notes may be hard to understand and may be serious in nature.    We look at every result and your provider will contact you to explain what they mean and discuss appropriate next steps. Please allow for at least 72 business hours for chart and result review.     We prefer that you wait for your care team to contact you with your results.  Often, your provider will discuss your results with you at your next appointment. We look forward to continuing to partner with you in your care.    Please review my practice information below:    If you have any prescription refill requests, please send them via Medesen or discuss with your provider at the start of your office visits. Please allow 3-5 business days for lab and testing review and you will be contacted via Medesen with those results, or if advised to make a follow up appointment regarding those results, then please do so.     Once resulted, your lab/test/imaging results will show up automatically in your MyChart. Please wait for my interpretation and recommendations prior to viewing your results to avoid any unnecessary confusion or misinterpretation. I will address all of the lab values that I interpret as abnormal and message you accordingly on your MyChart. I will always send you a message about your results even if they are normal. If you do not hear back from me within 5-7 business  days after completing your tests, then please send me a message on GoWorkaBit so I can obtain your results (especially if you went to an outside lab or imaging center - [facility], [facility], etc).     If you have any additional questions or concerns beyond my interpretation of your results, please make an appointment with me to discuss in further detail.    Please only use the GoWorkaBit messaging system for questions regarding your most recent appointment or if advised to use otherwise (glucose or blood pressure reporting).     If you have any new problems or concerns, you must make an appointment to discuss. This includes any referral requests, lab requests (unless advised to notify me for pre-appt labs), medication side effects, or request for medication adjustments.     Please arrive 15 minutes prior to your appointment time to complete your check-in and intake with the medical assistant.      Thank you,    Krista Onofre PA-C ([place])  Physician Assistant Certified  [facility]    -----------------------------------------------------------------    Attn: Patients of [facility]:    In an effort to continue to provide excellent and efficient care to our patients, it is vital that we continue to use our resources appropriately. With that, this is a reminder that GoWorkaBit is used for prescription refill requests, test results, virtual visits, and chart review only.     Any new questions, concerns/conditions, lab/imaging requests, medication adjustments, new prescriptions, or referral requests do require an appointment (virtually or in person), unless discussed otherwise at your most recent appointment.     Thank you for your understanding,    [facility]    Recommend DASH diet, exercise as tolerated. Monitor Blood Pressure at home and report any consistent readings above >140/90. Seek medical help/ER if chest pain, palpitations, shortness of breath, headache, dizziness.

## 2024-12-17 ENCOUNTER — APPOINTMENT (OUTPATIENT)
Dept: RADIOLOGY | Facility: MEDICAL CENTER | Age: 69
End: 2024-12-17
Attending: PHYSICIAN ASSISTANT
Payer: MEDICARE

## 2024-12-17 DIAGNOSIS — K59.09 OTHER CONSTIPATION: ICD-10-CM

## 2024-12-17 PROCEDURE — 74018 RADEX ABDOMEN 1 VIEW: CPT

## 2024-12-31 ENCOUNTER — APPOINTMENT (OUTPATIENT)
Dept: MEDICAL GROUP | Facility: IMAGING CENTER | Age: 69
End: 2024-12-31
Payer: MEDICARE

## 2025-01-03 ENCOUNTER — APPOINTMENT (OUTPATIENT)
Dept: MEDICAL GROUP | Facility: IMAGING CENTER | Age: 70
End: 2025-01-03
Payer: MEDICARE

## 2025-01-03 VITALS
HEIGHT: 69 IN | TEMPERATURE: 97.1 F | RESPIRATION RATE: 16 BRPM | OXYGEN SATURATION: 97 % | WEIGHT: 262.2 LBS | DIASTOLIC BLOOD PRESSURE: 74 MMHG | HEART RATE: 63 BPM | SYSTOLIC BLOOD PRESSURE: 124 MMHG | BODY MASS INDEX: 38.83 KG/M2

## 2025-01-03 DIAGNOSIS — Z79.891 CHRONIC USE OF OPIATE DRUG FOR THERAPEUTIC PURPOSE: ICD-10-CM

## 2025-01-03 DIAGNOSIS — R09.89 LABILE BLOOD PRESSURE: ICD-10-CM

## 2025-01-03 DIAGNOSIS — I89.0 LYMPHEDEMA: ICD-10-CM

## 2025-01-03 PROCEDURE — 99214 OFFICE O/P EST MOD 30 MIN: CPT | Performed by: PHYSICIAN ASSISTANT

## 2025-01-03 PROCEDURE — 3078F DIAST BP <80 MM HG: CPT | Performed by: PHYSICIAN ASSISTANT

## 2025-01-03 PROCEDURE — 3074F SYST BP LT 130 MM HG: CPT | Performed by: PHYSICIAN ASSISTANT

## 2025-01-03 RX ORDER — OXYCODONE AND ACETAMINOPHEN 5; 325 MG/1; MG/1
1 TABLET ORAL EVERY 8 HOURS PRN
Qty: 30 TABLET | Refills: 0 | Status: SHIPPED | OUTPATIENT
Start: 2025-01-03 | End: 2025-02-02

## 2025-01-03 ASSESSMENT — PATIENT HEALTH QUESTIONNAIRE - PHQ9: CLINICAL INTERPRETATION OF PHQ2 SCORE: 0

## 2025-01-03 ASSESSMENT — FIBROSIS 4 INDEX: FIB4 SCORE: 1.01

## 2025-01-03 NOTE — ASSESSMENT & PLAN NOTE
Pt states blood pressure at home ranging between 120-140 systolic and under 90 diastolic.  Has not started taking telmisartan yet.  No headaches, chest pain, palpitations, dizziness.

## 2025-01-03 NOTE — ASSESSMENT & PLAN NOTE
Has an appt in July at [facility] for potential surgery. Takes percocet as needed for pain. Needs refill.

## 2025-01-03 NOTE — PATIENT INSTRUCTIONS
It was a pleasure meeting with you today at [facility]!    Your medical history/records and medications were reviewed today.     UPDATE on MyChart Results: If you have blood work, and/or imaging studies, or any other test or procedure completed, you will have access to results as soon as they become available in MyChart. Recently, these results will be available for review at the same time that your provider is able to see results!    This will likely mean you will see a result before your provider has had a chance to review and discuss with you.  Some results or care notes may be hard to understand and may be serious in nature.    We look at every result and your provider will contact you to explain what they mean and discuss appropriate next steps. Please allow for at least 72 business hours for chart and result review.     We prefer that you wait for your care team to contact you with your results.  Often, your provider will discuss your results with you at your next appointment. We look forward to continuing to partner with you in your care.    Please review my practice information below:    If you have any prescription refill requests, please send them via [email or web address] or discuss with your provider at the start of your office visits. Please allow 3-5 business days for lab and testing review and you will be contacted via [email or web address] with those results, or if advised to make a follow up appointment regarding those results, then please do so.     Once resulted, your lab/test/imaging results will show up automatically in your MyChart. Please wait for my interpretation and recommendations prior to viewing your results to avoid any unnecessary confusion or misinterpretation. I will address all of the lab values that I interpret as abnormal and message you accordingly on your MyChart. I will always send you a message about your results even if they are normal. If you do not hear back from me within 5-7 business  days after completing your tests, then please send me a message on Wabeebwa so I can obtain your results (especially if you went to an outside lab or imaging center - [facility], [facility], etc).     If you have any additional questions or concerns beyond my interpretation of your results, please make an appointment with me to discuss in further detail.    Please only use the Wabeebwa messaging system for questions regarding your most recent appointment or if advised to use otherwise (glucose or blood pressure reporting).     If you have any new problems or concerns, you must make an appointment to discuss. This includes any referral requests, lab requests (unless advised to notify me for pre-appt labs), medication side effects, or request for medication adjustments.     Please arrive 15 minutes prior to your appointment time to complete your check-in and intake with the medical assistant.      Thank you,    Krista Onofre PA-C ([place])  Physician Assistant Certified  [facility]    -----------------------------------------------------------------    Attn: Patients of [facility]:    In an effort to continue to provide excellent and efficient care to our patients, it is vital that we continue to use our resources appropriately. With that, this is a reminder that Wabeebwa is used for prescription refill requests, test results, virtual visits, and chart review only.     Any new questions, concerns/conditions, lab/imaging requests, medication adjustments, new prescriptions, or referral requests do require an appointment (virtually or in person), unless discussed otherwise at your most recent appointment.     Thank you for your understanding,    [facility]

## 2025-02-12 NOTE — Clinical Note
[facility]  [id number] [address] [place] [address]    [id number]    Dorcas Michael  [address] [place] [address]    February 12, 2025    Member Name: Dorcas Michael   Member Number: [id number]   Reference Number: [id number]   Approved Services: Echos and EKG   Approved Service Dates: 02/12/2025 - 06/12/2025   Requesting Provider: Krista Onofre   Requested Provider: [facility]     Dear Dorcas Michael:    The following medical service(s) requested by Krista Onofre have been approved:    Number of Services: 1  Description: Echos and EKG  Echocardiogram Complete(no contrast)  Heart muscle strain imaging     The services should be provided by [facility] no later than 06/12/2025. Please contact the provider listed below with any questions.     Provider Information:  [facility]  [phone]    Your plan benefit may require a deductible, co-payment or coinsurance for these services. This authorization does not guarantee [facility] will pay the claim for services that you receive. Payment by [facility] [[email or web address]] for these services is subject to the terms of your Evidence of Coverage, your eligibility at the time of service, and confirmation of benefit coverage.    For any questions or additional information, please contact Customer Service:    [facility] Toll Free: [phone]  TTY users dial: 711   Call Center Hours:  Oct 1 - Mar 31, Mon - Fri 7 AM to 8 PM PST  Oct 1 - Mar 31, Sat - Sun 8 AM to 8 PM PST  Apr 1 - Sep 30, Mon - Fri 7 AM to 8 PM PST   Office Hours: Mon - Fri 8 AM to 5 PM PST   E-mail: [email or web address]   Website:  [email or web address]      This information is available for free in other languages. Please contact Customer Service at the phone number above for more information. [facility] complies with applicable Federal civil rights laws and does not discriminate on the  basis of race, color, national origin, age, disability or sex.    Sincerely,     Healthcare Utilization Management Department     Cc: [facility]   Krista Onofre    Multi-Language Insert  Multi- Services  English: We have free  services to answer any questions you may have about our health or drug plan.  To get an , just call us at [phone].  Someone who speaks English/Language can help you.  This is a free service.  Kyrgyz: Tenemos servicios de intérprete sin costo alguno  para responder cualquier pregunta que pueda tener sobre nuestro plan de yaneth o medicamentos. Para hablar con un intérprete, por favor llame al [phone]. Alguien que hable español le podrá ayudar. Nicki es un servicio gratuito.  Chinese Mandarin: ?????????????????????????????? ???????????????? [phone]????????????????? ?????????  Chinese Cantonese: ?????????????????????????????? ????????????? [phone]???????????????????? ????????  Tagalog:  Mayloni kaming libreng serbisyo sa pagsasaling-kat curielumang mga chelsea fowler hinggil sa bobg mayong charliegkim o panggamot.  Chana mesa tagasaling-john stephensi  [phone]. Anatoly mesa Tagalog.  Thomas ay libreng serbisyo.  Amharic:  Nous proposons guillermo services gratuits d'interprétation pour répondre à toutes seng questions relatives à notre régime de santé ou d'assurance-médicaments. Pour accéder au service d'interprétation, il vous suffit de nous appeler au [phone]. Un interlocuteur [facility] pourra vous aider. Ce service est gratuit.  Bengali:  Nicolette turk có d?ch v? thông d?ch mi?n phí ð? tr? l?i các câu h?i v? chýõng s?c kh?e và chýõng trình thu?c men. N?u quí v? c?n thông d?ch viên dinh g?i [phone] s? có nhân viên nói ti?ng Vi?t giúp ð? quí v?. Ðây là d?ch v? mi?n phí .  Afghan:  Jasmina chew  Dolmetscherservice beantwortet Ihren Fragen zu unserem Gesundheits- und Arzneimittelplan. Unsere Dolmetscher erreichen Sie [phone]. Man arnold Dawnn eloisa auf [facility]. Dieser Service ist [facility].  Tajik:  ??? ?? ?? ?? ?? ??? ?? ??? ?? ???? ?? ?? ???? ???? ????. ?? ???? ????? ?? [phone] ??? ??? ????.  ???? ?? ???? ?? ?? ????. ? ???? ??? ?????.   Gabonese: Åñëè ó âàñ âîçíèêíóò âîïðîñû îòíîñèòåëüíî ñòðàõîâîãî èëè ìåäèêàìåíòíîãî ïëàíà, âû ìîæåòå âîñïîëüçîâàòüñÿ íàøèìè áåñïëàòíûìè óñëóãàìè ïåðåâîä÷èêîâ. ×òîáû âîñïîëüçîâàòüñÿ óñëóãàìè ïåðåâîä÷èêà, ïîçâîíèòå íàì ïî òåëåôîíó [phone]. Âàì îêàæåò ïîìîùü ñîòðóäíèê, êîòîðûé ãîâîðèò ïî-póññêè. Äàííàÿ óñëóãà áåñïëàòíàÿ.  Macedonian: ÅääÇ äÞÏã ÎÏãÇÊ ÇáãÊÑÌã ÇáÝæÑí ÇáãÌÇäíÉ ááÅÌÇÈÉ Úä Ãí ÃÓÆáÉ ÊÊÚáÞ ÈÇáÕÍÉ Ãæ ÌÏæá ÇáÃÏæíÉ áÏíäÇ. ááÍÕæá Úáì ãÊÑÌã ÝæÑí¡ áíÓ Úáíß Óæì ÇáÇÊÕÇá ÈäÇ Úáì [phone] . ÓíÞæã ÔÎÕ ãÇ íÊÍÏË ÇáÚÑÈíÉ ÈãÓÇÚÏÊß. åÐå ÎÏãÉ ãÌÇäíÉ.  Rory: ????? ????????? ?? ??? ?? ????? ?? ???? ??? ???? ???? ?? ?????? ?? ???? ???? ?? ??? ????? ??? ????? ???????? ?????? ?????? ???. ?? ???????? ??????? ???? ?? ???, ?? ???? [phone] ?? ??? ????. ??? ??????? ?? ?????? ????? ?? ???? ??? ?? ???? ??. ?? ?? ????? ???? ??.   Sami:  È disponibile un servizio di interpretariato gratuito per rispondere a eventuali domande sul nostro piano sanitario e farmaceutico. Per un interprete, contattare il gerardo [phone]. Un nostro incaricato neil parla Italianovi fornirà l'assistenza necessaria. È un servizio gratuito.  Portugués:  Dispomos de serviços de interpretação gratuitos para responder a qualquer questão que tenha acerca do nosso plano de saúde ou de medicação. Para obter um intérprete, contacte-nos através do número [phone]. Irá encontrar alguém que fale o idioma  Português para o ajudar. Nicki serviço é gratuito.  Kiswahili Creole:  Nou genyen sèvis entèprèt gratis anamika reponn tout kesyon ou ta genyen konsènan plan medikal oswa dwòg nou an.  Anamika  jwenn yon entèprèt, jis rele nou nan [phone]. Neno gant pale Kreyòl kapab ivy w.  Sa a se yoromeo sèvkarlo garcía.  Polish:  Umo¿liwiamy bezp³atne skorzystanie z us³ug t³umacza ustnego, który pomo¿e w uzyskaniu odpowiedzi na temat planu zdrowotnego lub dawkowania leków. Maggie skorzystaæ z pomocy t³umacza znaj¹cego obdulia steele¿y zadzwoniæ pod numer [phone]. Ta us³uga jest bezp³atna.  Divehi: ????? ??????? ????????????????????? ??????????????????????????????????[phone] ???????????????? ? ????????????????? ?????

## 2025-02-24 ENCOUNTER — HOSPITAL ENCOUNTER (OUTPATIENT)
Dept: CARDIOLOGY | Facility: MEDICAL CENTER | Age: 70
End: 2025-02-24
Attending: PHYSICIAN ASSISTANT
Payer: MEDICARE

## 2025-02-24 ENCOUNTER — RESULTS FOLLOW-UP (OUTPATIENT)
Dept: MEDICAL GROUP | Facility: IMAGING CENTER | Age: 70
End: 2025-02-24

## 2025-02-24 DIAGNOSIS — R01.1 HEART MURMUR: ICD-10-CM

## 2025-02-24 DIAGNOSIS — I10 PRIMARY HYPERTENSION: ICD-10-CM

## 2025-02-24 DIAGNOSIS — R42 DIZZINESS: ICD-10-CM

## 2025-02-24 DIAGNOSIS — Z13.6 SCREENING FOR CARDIOVASCULAR CONDITION: ICD-10-CM

## 2025-02-24 LAB
LV EJECT FRACT  99904: 60
LV EJECT FRACT MOD 2C 99903: 59.82
LV EJECT FRACT MOD 4C 99902: 66.17
LV EJECT FRACT MOD BP 99901: 60.62

## 2025-02-24 PROCEDURE — 93306 TTE W/DOPPLER COMPLETE: CPT

## 2025-02-28 ENCOUNTER — PATIENT MESSAGE (OUTPATIENT)
Dept: MEDICAL GROUP | Facility: IMAGING CENTER | Age: 70
End: 2025-02-28
Payer: MEDICARE

## 2025-02-28 DIAGNOSIS — I08.0 MILD MITRAL AND AORTIC REGURGITATION: ICD-10-CM

## 2025-03-09 DIAGNOSIS — M18.0 PRIMARY ARTHROSIS OF FIRST CARPOMETACARPAL JOINTS, BILATERAL: ICD-10-CM

## 2025-03-10 RX ORDER — CELECOXIB 200 MG/1
200 CAPSULE ORAL
Qty: 90 CAPSULE | Refills: 0 | Status: SHIPPED | OUTPATIENT
Start: 2025-03-10

## 2025-03-10 NOTE — TELEPHONE ENCOUNTER
Received request via: Pharmacy    Was the patient seen in the last year in this department? Yes    Does the patient have an active prescription (recently filled or refills available) for medication(s) requested? No    Pharmacy Name: [facility]    Does the patient have long-term Plus and need 100-day supply? (This applies to ALL medications) Yes, quantity updated to 100 days

## 2025-03-20 ENCOUNTER — PATIENT MESSAGE (OUTPATIENT)
Dept: HEALTH INFORMATION MANAGEMENT | Facility: OTHER | Age: 70
End: 2025-03-20

## 2025-03-24 DIAGNOSIS — I10 PRIMARY HYPERTENSION: ICD-10-CM

## 2025-03-24 RX ORDER — TELMISARTAN 20 MG/1
20 TABLET ORAL DAILY
Qty: 100 TABLET | Refills: 0 | Status: SHIPPED | OUTPATIENT
Start: 2025-03-24

## 2025-03-24 NOTE — TELEPHONE ENCOUNTER
Received request via: Pharmacy    Was the patient seen in the last year in this department? Yes    Does the patient have an active prescription (recently filled or refills available) for medication(s) requested? No    Pharmacy Name: [facility]    Does the patient have senior living Plus and need 100-day supply? (This applies to ALL medications) Yes, quantity updated to 100 days  e

## 2025-04-12 SDOH — ECONOMIC STABILITY: FOOD INSECURITY: WITHIN THE PAST 12 MONTHS, THE FOOD YOU BOUGHT JUST DIDN'T LAST AND YOU DIDN'T HAVE MONEY TO GET MORE.: NEVER TRUE

## 2025-04-12 SDOH — ECONOMIC STABILITY: INCOME INSECURITY: IN THE LAST 12 MONTHS, WAS THERE A TIME WHEN YOU WERE NOT ABLE TO PAY THE MORTGAGE OR RENT ON TIME?: NO

## 2025-04-12 SDOH — ECONOMIC STABILITY: FOOD INSECURITY: WITHIN THE PAST 12 MONTHS, YOU WORRIED THAT YOUR FOOD WOULD RUN OUT BEFORE YOU GOT MONEY TO BUY MORE.: NEVER TRUE

## 2025-04-12 SDOH — HEALTH STABILITY: PHYSICAL HEALTH: ON AVERAGE, HOW MANY MINUTES DO YOU ENGAGE IN EXERCISE AT THIS LEVEL?: 30 MIN

## 2025-04-12 SDOH — HEALTH STABILITY: PHYSICAL HEALTH: ON AVERAGE, HOW MANY DAYS PER WEEK DO YOU ENGAGE IN MODERATE TO STRENUOUS EXERCISE (LIKE A BRISK WALK)?: 4 DAYS

## 2025-04-12 SDOH — ECONOMIC STABILITY: INCOME INSECURITY: HOW HARD IS IT FOR YOU TO PAY FOR THE VERY BASICS LIKE FOOD, HOUSING, MEDICAL CARE, AND HEATING?: NOT HARD AT ALL

## 2025-04-12 ASSESSMENT — LIFESTYLE VARIABLES
HOW OFTEN DO YOU HAVE SIX OR MORE DRINKS ON ONE OCCASION: NEVER
HOW MANY STANDARD DRINKS CONTAINING ALCOHOL DO YOU HAVE ON A TYPICAL DAY: 1 OR 2
SKIP TO QUESTIONS 9-10: 1
AUDIT-C TOTAL SCORE: 4
HOW OFTEN DO YOU HAVE A DRINK CONTAINING ALCOHOL: 4 OR MORE TIMES A WEEK

## 2025-04-12 ASSESSMENT — SOCIAL DETERMINANTS OF HEALTH (SDOH)
WITHIN THE PAST 12 MONTHS, YOU WORRIED THAT YOUR FOOD WOULD RUN OUT BEFORE YOU GOT THE MONEY TO BUY MORE: NEVER TRUE
IN A TYPICAL WEEK, HOW MANY TIMES DO YOU TALK ON THE PHONE WITH FAMILY, FRIENDS, OR NEIGHBORS?: MORE THAN THREE TIMES A WEEK
IN THE PAST 12 MONTHS, HAS THE ELECTRIC, GAS, OIL, OR WATER COMPANY THREATENED TO SHUT OFF SERVICE IN YOUR HOME?: NO
HOW OFTEN DO YOU GET TOGETHER WITH FRIENDS OR RELATIVES?: MORE THAN THREE TIMES A WEEK
HOW OFTEN DO YOU GET TOGETHER WITH FRIENDS OR RELATIVES?: MORE THAN THREE TIMES A WEEK
HOW HARD IS IT FOR YOU TO PAY FOR THE VERY BASICS LIKE FOOD, HOUSING, MEDICAL CARE, AND HEATING?: NOT HARD AT ALL
HOW OFTEN DO YOU ATTEND CHURCH OR RELIGIOUS SERVICES?: NEVER
IN A TYPICAL WEEK, HOW MANY TIMES DO YOU TALK ON THE PHONE WITH FAMILY, FRIENDS, OR NEIGHBORS?: MORE THAN THREE TIMES A WEEK
HOW OFTEN DO YOU HAVE SIX OR MORE DRINKS ON ONE OCCASION: NEVER
HOW OFTEN DO YOU ATTEND CHURCH OR RELIGIOUS SERVICES?: NEVER
HOW OFTEN DO YOU HAVE A DRINK CONTAINING ALCOHOL: 4 OR MORE TIMES A WEEK
DO YOU BELONG TO ANY CLUBS OR ORGANIZATIONS SUCH AS CHURCH GROUPS UNIONS, FRATERNAL OR ATHLETIC GROUPS, OR SCHOOL GROUPS?: NO
HOW MANY DRINKS CONTAINING ALCOHOL DO YOU HAVE ON A TYPICAL DAY WHEN YOU ARE DRINKING: 1 OR 2
DO YOU BELONG TO ANY CLUBS OR ORGANIZATIONS SUCH AS CHURCH GROUPS UNIONS, FRATERNAL OR ATHLETIC GROUPS, OR SCHOOL GROUPS?: NO

## 2025-04-16 ENCOUNTER — OFFICE VISIT (OUTPATIENT)
Dept: MEDICAL GROUP | Facility: IMAGING CENTER | Age: 70
End: 2025-04-16
Payer: MEDICARE

## 2025-04-16 VITALS
DIASTOLIC BLOOD PRESSURE: 80 MMHG | TEMPERATURE: 97.2 F | WEIGHT: 262.2 LBS | BODY MASS INDEX: 38.83 KG/M2 | RESPIRATION RATE: 16 BRPM | HEART RATE: 66 BPM | OXYGEN SATURATION: 96 % | HEIGHT: 69 IN | SYSTOLIC BLOOD PRESSURE: 134 MMHG

## 2025-04-16 DIAGNOSIS — Z79.891 CHRONIC USE OF OPIATE FOR THERAPEUTIC PURPOSE: ICD-10-CM

## 2025-04-16 DIAGNOSIS — G89.4 CHRONIC PAIN SYNDROME: ICD-10-CM

## 2025-04-16 DIAGNOSIS — Z00.00 ENCOUNTER FOR MEDICARE ANNUAL WELLNESS EXAM: ICD-10-CM

## 2025-04-16 DIAGNOSIS — E66.01 SEVERE OBESITY (HCC): ICD-10-CM

## 2025-04-16 DIAGNOSIS — I89.0 LYMPHEDEMA: ICD-10-CM

## 2025-04-16 DIAGNOSIS — Z12.31 ENCOUNTER FOR SCREENING MAMMOGRAM FOR MALIGNANT NEOPLASM OF BREAST: ICD-10-CM

## 2025-04-16 PROCEDURE — 3079F DIAST BP 80-89 MM HG: CPT

## 2025-04-16 PROCEDURE — 3075F SYST BP GE 130 - 139MM HG: CPT

## 2025-04-16 PROCEDURE — 1125F AMNT PAIN NOTED PAIN PRSNT: CPT

## 2025-04-16 PROCEDURE — G0439 PPPS, SUBSEQ VISIT: HCPCS

## 2025-04-16 RX ORDER — OXYCODONE HYDROCHLORIDE 5 MG/1
5-30 CAPSULE ORAL EVERY 4 HOURS PRN
COMMUNITY
End: 2025-04-16

## 2025-04-16 RX ORDER — OXYCODONE AND ACETAMINOPHEN 5; 325 MG/1; MG/1
1 TABLET ORAL
Qty: 30 TABLET | Refills: 0 | Status: SHIPPED | OUTPATIENT
Start: 2025-04-16 | End: 2025-05-16

## 2025-04-16 RX ORDER — OXYCODONE HYDROCHLORIDE 5 MG/1
5-30 CAPSULE ORAL EVERY 4 HOURS PRN
Qty: 20 CAPSULE | Refills: 0 | Status: CANCELLED | OUTPATIENT
Start: 2025-04-16

## 2025-04-16 ASSESSMENT — FIBROSIS 4 INDEX: FIB4 SCORE: 1.01

## 2025-04-16 ASSESSMENT — ACTIVITIES OF DAILY LIVING (ADL): BATHING_REQUIRES_ASSISTANCE: 0

## 2025-04-16 ASSESSMENT — ENCOUNTER SYMPTOMS: GENERAL WELL-BEING: GOOD

## 2025-04-16 ASSESSMENT — PAIN SCALES - GENERAL: PAINLEVEL_OUTOF10: 5=MODERATE PAIN

## 2025-04-16 ASSESSMENT — PATIENT HEALTH QUESTIONNAIRE - PHQ9: CLINICAL INTERPRETATION OF PHQ2 SCORE: 0

## 2025-04-16 NOTE — PROGRESS NOTES
Chief Complaint   Patient presents with    Annual Exam     [facility]       HPI:  Dorcas Michael is a 69 y.o. here for Medicare Annual Wellness Visit     Lymphedema of both lower extremity  Chronic pain syndrome  Chronic use of opiate for therapeutic purpose  Chronic and ongoing condition for ~20 years. Pt takes Celebrex and Percocet PRN which is helpful.   Patient is scheduled for an in advance lymphatic surgery at [facility] next July 2025.  She wears compression stockings prescribed by PT.   She is requesting refill on Percocet. She uses it sparingly.   Denies constipation.   F/u Dr. Aragon on 4/21  She denies fevers, chills, fatigue, malaise.  Denies lower extremity redness, warmth, skin discoloration, numbness, tingling, weakness.    Obesity   Established condition.  She is working on weight loss  She eats healthy  She exercises regularly but admits that lymphedema limits her exercises. SHe uses eliptical and stationary bike and uses weights    Patient Active Problem List    Diagnosis Date Noted    Other constipation 12/16/2024    Labile blood pressure 12/16/2024    Class 2 severe obesity with serious comorbidity and body mass index (BMI) of 36.0 to 36.9 in adult, unspecified obesity type (HCC) 03/13/2024    Body mass index (BMI) 37.0-37.9, adult 03/13/2024    Chronic pain syndrome 02/01/2024    Chronic use of opiate drug for therapeutic purpose 02/01/2024    DDD (degenerative disc disease), lumbar 12/22/2022    Chronic midline low back pain without sciatica 12/22/2022    Recurrent UTI 11/10/2021    Umbilical hernia without obstruction and without gangrene 05/20/2020    Achilles tendinitis of left lower extremity 05/20/2020    Primary arthrosis of first carpometacarpal joints, bilateral 08/22/2017    Obesity (BMI 30-39.9) 06/29/2017    Bilateral primary osteoarthritis of first carpometacarpal joints 09/20/2016    Lymphedema 06/09/2016       Current Outpatient Medications   Medication Sig  Dispense Refill    oxyCODONE-acetaminophen (PERCOCET) 5-325 MG Tab Take 1 Tablet by mouth 1 time a day as needed for Severe Pain for up to 30 days. 30 Tablet 0    celecoxib (CELEBREX) 200 MG Cap TAKE 1 CAPSULE BY MOUTH EVERY DAY 90 Capsule 0    NON SPECIFIED Take 1,000 mg by mouth every day. DIOSMIN      Probiotic Product (PROBIOTIC BLEND PO) 1capsule daily      nystatin (MYCOSTATIN) 757554 UNIT/GM Cream topical cream APPLY 1 GM TO AFFECTED AREA(S) 2 TIMES A DAY. 90 g 2    B Complex Vitamins (VITAMIN B COMPLEX PO) every day.      Cholecalciferol (VITAMIN D3) 5000 UNITS Cap Take 1 Capsule by mouth every day.      FIBER PO Take  by mouth every day. 6 caps      diphenhydrAMINE (BENADRYL) 25 MG Tab Take 1 Tablet by mouth every 6 hours as needed for Sleep.      telmisartan (MICARDIS) 20 MG tablet TAKE 1 TABLET BY MOUTH EVERY  Tablet 0     No current facility-administered medications for this visit.          Current supplements as per medication list.     Allergies: Patient has no known allergies.    Current social contact/activities: Working part time, going out with [employer], playing [employer],  camping, [employer] with friends.    She  reports that she has never smoked. She has never used smokeless tobacco. She reports current alcohol use of about 0.6 - 2.4 oz of alcohol per week. She reports that she does not use drugs.  Counseling given: Not Answered      ROS:    Gait: Uses no assistive device  Ostomy: No  Other tubes: No  Amputations: No  Chronic oxygen use: No  Last eye exam: 01/2025  Wears hearing aids: No   : Denies any urinary leakage during the last 6 months    Screening:    Depression Screening  Little interest or pleasure in doing things?  0 - not at all  Feeling down, depressed , or hopeless? 0 - not at all  Patient Health Questionnaire Score: 0     If depressive symptoms identified deferred to follow up visit unless specifically addressed in assessment and plan.    Interpretation of PHQ-9 Total Score    Score Severity   1-4 No Depression   5-9 Mild Depression   10-14 Moderate Depression   15-19 Moderately Severe Depression   20-27 Severe Depression    Screening for Cognitive Impairment  Do you or any of your friends or family members have any concern about your memory? No  Three Minute Recall (Village, Kitchen, Baby) 3/3    Daniel clock face with all 12 numbers and set the hands to show 10 minutes past 11.  Yes    Cognitive concerns identified deferred for follow up unless specifically addressed in assessment and plan.    Fall Risk Assessment  Has the patient had two or more falls in the last year or any fall with injury in the last year?  No    Safety Assessment  Do you always wear your seatbelt?  Yes  Any changes to home needed to function safely? No  Difficulty hearing.  No  Patient counseled about all safety risks that were identified.    Functional Assessment ADLs  Are there any barriers preventing you from cooking for yourself or meeting nutritional needs?  No.    Are there any barriers preventing you from driving safely or obtaining transportation?  No.    Are there any barriers preventing you from using a telephone or calling for help?  No    Are there any barriers preventing you from shopping?  No.    Are there any barriers preventing you from taking care of your own finances?  No    Are there any barriers preventing you from managing your medications?  No    Are there any barriers preventing you from showering, bathing or dressing yourself? No    Are there any barriers preventing you from doing housework or laundry? No  Are there any barriers preventing you from using the toilet?No  Are you currently engaging in any exercise or physical activity?  Yes.      Self-Assessment of Health  What is your perception of your health? Good    Do you sleep more than six hours a night? No    In the past 7 days, how much did pain keep you from doing your normal work? Some    Do you spend quality time with family or  friends (virtually or in person)? Yes    Do you usually eat a heart healthy diet that constists of a variety of fruits, vegetables, whole grains and fiber? Yes    Do you eat foods high in fat and/or Fast Food more than three times per week? No    How concerned are you that your medical conditions are not being well managed? Not at all    Are you worried that in the next 2 months, you may not have stable housing that you own, rent, or stay in as part of a household? No      Advance Care Planning  Do you have an Advance Directive, Living Will, Durable Power of , or POLST? No                 Health Maintenance Summary            Current Care Gaps       COVID-19 Vaccine (5 - 2024-25 season) Overdue since 9/1/2024 11/05/2023  Imm Admin: Comirnaty (Covid-19 Vaccine, Mrna, [id number] Formula)    12/13/2021  Imm Admin: MODERNA SARS-COV-2 VACCINE (12+)    04/10/2021  Imm Admin: MODERNA SARS-COV-2 VACCINE (12+)    03/12/2021  Imm Admin: MODERNA SARS-COV-2 VACCINE (12+)                      Needs Review       Colorectal Cancer Screening (Colonoscopy - Every 5 Years) Tentatively due on 1/20/2026 01/20/2021  REFERRAL TO GI FOR COLONOSCOPY    07/31/2015  REFERRAL TO GI FOR COLONOSCOPY    02/24/2005  REFERRAL TO GI FOR COLONOSCOPY              Bone Density Scan (Every 5 Years) Tentatively due on 11/26/2029 11/26/2024  DS-BONE DENSITY STUDY (DEXA)    09/24/2019  DS-BONE DENSITY STUDY (DEXA)    06/25/2013  DS-BONE DENSITY STUDY (DEXA)                      Awaiting Completion       Mammogram (Yearly) Order placed this encounter      04/16/2025  Order placed for MA-SCREENING MAMMO BILAT W/TOMOSYNTHESIS W/CAD by ADELFO Sanchez    04/03/2023  MA-SCREENING MAMMO BILAT W/TOMOSYNTHESIS W/CAD    02/28/2022  MA-SCREENING MAMMO BILAT W/TOMOSYNTHESIS W/CAD    12/21/2020  MA-SCREENING MAMMO BILAT W/TOMOSYNTHESIS W/CAD    09/24/2019  MA-SCREENING MAMMO BILAT W/TOMOSYNTHESIS W/CAD     Only the first 5 history  entries have been loaded, but more history exists.                    Upcoming       Influenza Vaccine (Season Ended) Postponed until 10/8/2025      No completion history exists for this topic.              Urine Drug Screening (Every 360 Days) Next due on 7/18/2025 07/23/2024  URINE DRUG SCREEN W/CONF (AR)    07/19/2023  URINE DRUG SCREEN W/O CONF (AR)    04/07/2022  PAIN MANAGEMENT SCRN, UR              Annual Wellness Visit (Yearly) Next due on 4/16/2026 04/16/2025  Visit Dx: Encounter for Medicare annual wellness exam    03/13/2024  Level of Service: [place] ANNUAL WELLNESS VISIT-INCLUDES PPPS SUBSEQUE*    03/13/2024  Visit Dx: Encounter for Medicare annual wellness exam    02/23/2023  Level of Service: [place] ANNUAL WELLNESS VISIT-INCLUDES PPPS SUBSEQUE*    02/23/2023  Visit Dx: Encounter for Medicare annual wellness exam     Only the first 5 history entries have been loaded, but more history exists.            IMM DTaP/Tdap/Td Vaccine (2 - Td or Tdap) Next due on 11/14/2027 11/14/2017  Imm Admin: Tdap Vaccine                      Completed or No Longer Recommended       Zoster (Shingles) Vaccines (Series Information) Completed      08/09/2022  Imm Admin: Zoster Vaccine Recombinant (RZV) (SHINGRIX)    12/21/2021  Imm Admin: Zoster Vaccine Recombinant (RZV) (SHINGRIX)    10/01/2014  Imm Admin: Zoster Vaccine Live (ZVL) (Zostavax) - HISTORICAL DATA              Pneumococcal Vaccine: 50+ Years (Series Information) Completed      02/23/2023  Imm Admin: Pneumococcal Conjugate Vaccine (PCV20)    11/19/2019  Imm Admin: Pneumococcal polysaccharide vaccine (PPSV-23)              Hepatitis A Vaccine (Hep A) (Series Information) Aged Out      No completion history exists for this topic.              Hepatitis B Vaccine (Hep B) (Series Information) Aged Out     No completion history exists for this topic.              HPV Vaccines (Series Information) Aged Out     No completion history exists for this topic.               Polio Vaccine (Inactivated Polio) (Series Information) Aged Out     No completion history exists for this topic.              Meningococcal Immunization (Series Information) Aged Out     No completion history exists for this topic.              Cervical Cancer Screening  Discontinued        Frequency changed to Never automatically (Topic No Longer Applies)    07/06/2021  Pathology Gynecology Specimen    07/06/2021  THINPREP PAP WITH HPV    09/13/2016  PATHOLOGY GYN SPECIMEN    09/13/2016  THINPREP PAP WITH HPV     Only the first 5 history entries have been loaded, but more history exists.            Hepatitis C Screening  Discontinued      No completion history exists for this topic.                            Patient Care Team:  ADELFO Sanchez as PCP - General (Nurse Practitioner Family)  Karina Silva P.A.-C. as PCP - [facility]  Alfonso Henry Ass't (Inactive) as    [facility] [place]  as Consulting Physician  Matt Torrez O.D. as Consulting Physician (Optometry)        Social History     Tobacco Use    Smoking status: Never    Smokeless tobacco: Never   Vaping Use    Vaping status: Never Used   Substance Use Topics    Alcohol use: Yes     Alcohol/week: 0.6 - 2.4 oz     Types: 1 - 4 Glasses of wine per week     Comment: 1-2 per day    Drug use: No     Family History   Problem Relation Age of Onset    Heart Disease Other     Other Mother         MS    Thyroid Daughter     GI Disease Other         Crohns    Thyroid Sister     Hyperlipidemia Sister     Hyperlipidemia Father      She  has a past medical history of Arthritis, Lymphedema (1995), Pain, Pain, and Pain.   Past Surgical History:   Procedure Laterality Date    VENTRAL HERNIA REPAIR LAPAROSCOPIC  02/16/2021    Procedure: REPAIR, HERNIA, VENTRAL, LAPAROSCOPIC - UMBILICAL;  Surgeon: Lilliana Mejia M.D.;  Location: SURGERY SAME DAY [facility];  Service: General    HERNIA REPAIR  02/16/2021    Umbilical      "FINGER ARTHROPLASTY Left 08/22/2017    Procedure: FINGER ARTHROPLASTY - CARPAL METACARPAL;  Surgeon: Magnus Anderson M.D.;  Location: [facility];  Service:     TENDON TRANSFER Left 08/22/2017    Procedure: TENDON TRANSFER - FLEXI CARPI RADIALIS;  Surgeon: Magnus Anderson M.D.;  Location: [facility];  Service:     CAPSULOTOMY  08/22/2017    Procedure: CAPSULOTOMY - METACARPAL PHALANGEAL JOINT CAPSULODESIS;  Surgeon: Magnus Anderson M.D.;  Location: [facility];  Service:     FINGER ARTHROPLASTY Right 09/20/2016    Procedure: FINGER ARTHROPLASTY - CARPAL METACARPAL;  Surgeon: Magnus Anderson M.D.;  Location: [facility];  Service:     TENDON TRANSFER Right 09/20/2016    Procedure: TENDON TRANSFER - FLEXI CARPI RADIALIS;  Surgeon: Magnus Anderson M.D.;  Location: [facility];  Service:     PIN INSERTION Right 09/20/2016    Procedure: PIN INSERTION - METACARPAL PHALANGEAL JOINT;  Surgeon: Magnus Anderson M.D.;  Location: [facility];  Service:     KNEE MANIPULATION  03/01/2010    Performed by OSGOOD, PATRICK J at [facility]    KNEE ARTHROPLASTY TOTAL  11/03/2009    Performed by OSGOOD, PATRICK J at [facility]    ACHILLES TENDON REP Right 05/2005    right    KNEE ARTHROPLASTY TOTAL Right 12/2003    right    KNEE ARTHROSCOPY Right 05/1998    right    RECTOCELE REPAIR  06/1990    repair cystocele    KNEE ARTHROTOMY Left 1973    left lateral meniscectomy    KNEE ARTHROTOMY Right 1971    right lateral meniscectomy    HAND SURGERY  2015, 2017    ORIF, KNEE  2003, 2008       Exam:   /80 (BP Location: Left arm, Patient Position: Sitting, BP Cuff Size: Adult)   Pulse 66   Temp 36.2 °C (97.2 °F) (Temporal)   Resp 16   Ht 1.753 m (5' 9\")   Wt 119 kg (262 lb 3.2 oz)   SpO2 96%  Body mass index is 38.72 kg/m².    Hearing excellent.    Dentition good, f/u with dentist regularly  Alert, oriented in no acute " distress.  Eye contact is good, speech goal directed, affect calm    Assessment and Plan. The following treatment and monitoring plan is recommended:      1. Encounter for Medicare annual wellness exam  PMH/PSH/FH/Social history reviewed. Medication reconciled. Vaccinations discussed. Previous records and labs reviewed. Discussed age appropriate anticipatory guidance.    2. Severe obesity (HCC)  3. Body mass index (BMI) of 38.0-38.9 in adult  HCC Gap Form    Diagnosis: E66.01 - Severe obesity (HCC)  Z68.38 - Body mass index (BMI) of 38.0-38.9 in adult  Comorbidity Diagnosis: Primary arthrosis of first carpometacarpal joints, bilateral  The current BMI is 38.72 kg/m² as of 04/16/25.    Past BMI Values:  04/16/25 : 38.72 kg/m². 01/03/25 : 38.72 kg/m². 12/16/24 : 37.51 kg/m².   Assessment and plan: Chronic, stable. Encouraged healthy diet and physical activity changes with a goal of weight loss. Follow up at least annually. The comorbid condition is unchanged based on today's assessment. Continue current treatment plan including control of risk factors. Follow up in 6 months.  Last edited 04/16/25 14:12 PDT by ADELFO Sanchez       4. Lymphedema  5. Chronic pain syndrome  6. Chronic use of opiate for therapeutic purpose  Chronic and ongoing condition. Pain well managed with percocet PRN. Med refill sent to pharmacy.   Recommend to continue with compression stockings.  Obtained and reviewed patient utilization report from [facility] pharmacy database on 5/8/2024 7:21 PM  prior to writing prescription for controlled substance II, III or IV per [place] bill . Based on assessment of the report, the prescription is medically necessary.   Recommend to follow-up with vascular medicine as scheduled.  - oxyCODONE-acetaminophen (PERCOCET) 5-325 MG Tab; Take 1 Tablet by mouth 1 time a day as needed for Severe Pain for up to 30 days.  Dispense: 30 Tablet; Refill: 0      7. Encounter for screening mammogram for  malignant neoplasm of breast    - MA-SCREENING MAMMO BILAT W/TOMOSYNTHESIS W/CAD; Future      Services suggested: No services needed at this time  Health Care Screening: Age-appropriate preventive services recommended by USPTF and ACIP covered by Medicare were discussed today. Services ordered if indicated and agreed upon by the patient.  Referrals offered: Community-based lifestyle interventions to reduce health risks and promote self-management and wellness, fall prevention, nutrition, physical activity, tobacco-use cessation, weight loss, and mental health services as per orders if indicated.    Discussion today about general wellness and lifestyle habits:    Prevent falls and reduce trip hazards; Cautioned about securing or removing rugs.  Have a working fire alarm and carbon monoxide detector;   Engage in regular physical activity and social activities     Follow-up: Return in about 3 months (around 7/16/2025), or if symptoms worsen or fail to improve.     Thank you, Manda NASSAR  [facility]

## 2025-04-21 ENCOUNTER — OFFICE VISIT (OUTPATIENT)
Dept: CARDIOLOGY | Facility: MEDICAL CENTER | Age: 70
End: 2025-04-21
Payer: MEDICARE

## 2025-04-21 VITALS
RESPIRATION RATE: 16 BRPM | DIASTOLIC BLOOD PRESSURE: 62 MMHG | BODY MASS INDEX: 38.36 KG/M2 | HEART RATE: 67 BPM | HEIGHT: 69 IN | WEIGHT: 259 LBS | OXYGEN SATURATION: 95 % | SYSTOLIC BLOOD PRESSURE: 124 MMHG

## 2025-04-21 DIAGNOSIS — E66.9 OBESITY (BMI 30-39.9): ICD-10-CM

## 2025-04-21 DIAGNOSIS — I35.1 MILD AORTIC REGURGITATION: ICD-10-CM

## 2025-04-21 DIAGNOSIS — I89.0 LYMPHEDEMA: ICD-10-CM

## 2025-04-21 DIAGNOSIS — I34.0 MILD MITRAL REGURGITATION: ICD-10-CM

## 2025-04-21 PROCEDURE — 99213 OFFICE O/P EST LOW 20 MIN: CPT | Performed by: INTERNAL MEDICINE

## 2025-04-21 ASSESSMENT — FIBROSIS 4 INDEX: FIB4 SCORE: 1.01

## 2025-04-21 NOTE — PATIENT INSTRUCTIONS
You may want to consider tirzepatide (Mounjaro®, Zepbound® ), semaglutide (Ozempic®, Wegovy®),  which are injections with cardiovascular benefits but also risks.  Gastrointestinal side effects are common:15-45% of patients.  These are not advised if you have a history of pancreatitis, Type I Diabetes, personal of family history of multiple endocrine neoplasia MEN syndrome. They should be used in caution with patients with gastroparesis and retinopathy.  They can cause serious health effects such as muscle wasting if you are not on a proper diet and weight loss achieved is rapidy regained when you stop the medication, they are intended for lifetime use.  STRENGTH TRAINING IS IMPORTANT AT LEAST TWICE A WEEK.       Work on at least 2.5 - 5 hours a week of moderate exercise (typical brisk walking or similar activity)    If you have had a heart attack, stent, bypass or reduced heart strength (EF <35%): cardiac rehab may reduce your risk of dying by 13-24% and need to go to the hospital by 30% within the first year (1)    Please look into the following diets and incorporate them into your diet    CONSIDER CHECKING A CALCIUM SCORE TO UNDERSTAND YOUR RISK MORE    https://internal.trejo-nhlbi.org/about/procedures/tools/acuu-zvezh-rzch-calculator    LOW SALT DIET   KEEP YOUR SODIUM EQUAL TO CALORIES AND NO MORE THAN DOUBLE THE CALORIES FOR A LOW SALT DIET    Cardiosmart.org - great resource for American College of Cardiology on heart disease prevention and treatment    FOR TREATMENT OR PREVENTION OF CORONARY ARTERY DISEASE  These three programs are approved by Medicare/Insurers for those with heart disease  [place] - [facility]  Dr. Olivera's Program for Reversing Heart Disease - Giovanni Suarezs Cardiologist vegetarian-based  [facility] Cardiac [facility] Program - [place]-based mind-body Program    Mediterranean Diet has been shown to be a hearty healthy diet.    This is a commonly referenced  Program  Dr Nugent - [place] over [place] (book and documentary) - vegetarian-based    FOR TREATMENT OF BLOOD PRESSURE  DASH DIET - American Heart Association for treatment of HYPERTENSION    FOR TREATMENT OF BAD CHOLESTEROL/FATS  REDUCE PROCESSED SUGAR AS MUCH AS POSSIBLE  INCREASE WHOLE GRAINS/VEGETABLES  INCREASE FIBER    Lowering total cholesterol and LDL (bad) cholesterol:  - Eat leaner cuts of meat, or eliminate altogether if possible red meat, and frequently substitute fish or chicken.  - Limit saturated fat to no more than 7-10% of total calories no more than 10 g per day is recommended. Some sources of saturated fat include butter, animal fats, hydrogenated vegetable fats and oils, many desserts, whole milk dairy products.  - Replaced saturated fats with polyunsaturated fats and monounsaturated fats. Foods high in monounsaturated fat include nuts, canola oil, avocados, and olives.  - Limit trans fat (processed foods) and replaced with fresh fruits and vegetables  - Recommend nonfat dairy products  - Increase substantially the amount of soluble fiber intake (legumes such as beans, fruit, whole grains).  - Consider nutritional supplements: plant sterile spreads such as Benecol, fish oil,  flaxseed oil, omega-3 acids capsules 1000 mg twice a day, or viscous fiber such as Metamucil  - Attain ideal weight and regular exercise (at least 30 minutes per day of moderate exercise)  ASK ABOUT STATIN OR NON STATIN MEDICATION TO REDUCE YOUR LDL AND HEART RISK    Lowering triglycerides:  - Reduce intake of simple sugar: Desserts, candy, pastries, honey, sodas, sugared cereals, yogurt, Gatorade, sports bars, canned fruit, smoothies, fruit juice, coffee drinks  - Reduced intake of refined starches: Refined Pasta, most bread  - Reduce or abstain from alcohol  - Increase omega-3 fatty acids: [place], Trout, Mackerel, [place]ring, [place] tuna and supplements  - Attain ideal weight and regular exercise (at least 30 minutes per  day of moderate exercise)  ASK ABOUT PURIFIED OMEGA 3 EPA or FISH OIL TO REDUCE YOUR TG AND HEART RISK    Elevating HDL (good) cholesterol:  - Increase physical activity  - Increase omega-3 fatty acids and supplements as listed above  - Incorporating appropriate amounts of monounsaturated fats such as nuts, olive oil, canola oil, avocados, olives  - Stop smoking  - Attain ideal weight and regular exercise (at least 30 minutes per day of moderate exercise)

## 2025-04-21 NOTE — PROGRESS NOTES
Chief Complaint   Patient presents with    Follow-Up     FV DX:Mild mitral and aortic regurgitation       Subjective     Dorcas Michael is a 69 y.o. female who presents today  in consultation from ADELFO Sanchez for evaluation of with chronic lymphedema recent had echo with mild AI and MR on recent echo    Past Medical History:   Diagnosis Date    Arthritis     both hands    Lymphedema 1995    bilateral legs    Pain     left knee 4/10    Pain     hands, feet    Pain     left hand     Past Surgical History:   Procedure Laterality Date    VENTRAL HERNIA REPAIR LAPAROSCOPIC  02/16/2021    Procedure: REPAIR, HERNIA, VENTRAL, LAPAROSCOPIC - UMBILICAL;  Surgeon: Lilliana Mejia M.D.;  Location: SURGERY SAME DAY [facility];  Service: General    HERNIA REPAIR  02/16/2021    Umbilical     FINGER ARTHROPLASTY Left 08/22/2017    Procedure: FINGER ARTHROPLASTY - CARPAL METACARPAL;  Surgeon: Magnus Anderson M.D.;  Location: [facility];  Service:     TENDON TRANSFER Left 08/22/2017    Procedure: TENDON TRANSFER - FLEXI CARPI RADIALIS;  Surgeon: Magnus Anderson M.D.;  Location: [facility];  Service:     CAPSULOTOMY  08/22/2017    Procedure: CAPSULOTOMY - METACARPAL PHALANGEAL JOINT CAPSULODESIS;  Surgeon: Magnus Anderson M.D.;  Location: [facility];  Service:     FINGER ARTHROPLASTY Right 09/20/2016    Procedure: FINGER ARTHROPLASTY - CARPAL METACARPAL;  Surgeon: Magnus Anderson M.D.;  Location: [facility];  Service:     TENDON TRANSFER Right 09/20/2016    Procedure: TENDON TRANSFER - FLEXI CARPI RADIALIS;  Surgeon: Magnus Anderson M.D.;  Location: [facility];  Service:     PIN INSERTION Right 09/20/2016    Procedure: PIN INSERTION - METACARPAL PHALANGEAL JOINT;  Surgeon: Magnus Anderson M.D.;  Location: [facility];  Service:     KNEE MANIPULATION  03/01/2010    Performed by OSGOOD, PATRICK J at [facility]    KNEE  ARTHROPLASTY TOTAL  11/03/2009    Performed by OSGOOD, PATRICK J at SURGERY [facility]    ACHILLES TENDON REP Right 05/2005    right    KNEE ARTHROPLASTY TOTAL Right 12/2003    right    KNEE ARTHROSCOPY Right 05/1998    right    RECTOCELE REPAIR  06/1990    repair cystocele    KNEE ARTHROTOMY Left 1973    left lateral meniscectomy    KNEE ARTHROTOMY Right 1971    right lateral meniscectomy    HAND SURGERY  2015, 2017    ORIF, KNEE  2003, 2008     Family History   Problem Relation Age of Onset    Heart Disease Other     Other Mother         MS    Thyroid Daughter     GI Disease Other         Crohns    Thyroid Sister     Hyperlipidemia Sister     Hyperlipidemia Father      Social History     Socioeconomic History    Marital status:      Spouse name: Not on file    Number of children: Not on file    Years of education: Not on file    Highest education level: Associate degree: occupational, technical, or vocational program   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    Tobacco Use    Smoking status: Never    Smokeless tobacco: Never   Vaping Use    Vaping status: Never Used   Substance and Sexual Activity    Alcohol use: Yes     Alcohol/week: 0.6 - 2.4 oz     Types: 1 - 4 Glasses of wine per week     Comment: 1-2 per day    Drug use: No    Sexual activity: Not Currently     Partners: Male   Other Topics Concern    Not on file   Social History Narrative    Not on file     Social Drivers of Health     Financial Resource Strain: Low Risk  (4/12/2025)    Overall Financial Resource Strain (CARDIA)     Difficulty of Paying Living Expenses: Not hard at all   Food Insecurity: No Food Insecurity (4/12/2025)    Hunger Vital Sign     Worried About Running Out of Food in the Last Year: Never true     Ran Out of Food in the Last Year: Never true   Transportation Needs: No Transportation Needs (4/12/2025)    PRAPARE - Transportation     Lack of Transportation (Medical): No     Lack of Transportation (Non-Medical):  No   Physical Activity: Insufficiently Active (4/12/2025)    Exercise Vital Sign     Days of Exercise per Week: 4 days     Minutes of Exercise per Session: 30 min   Stress: No Stress Concern Present (4/12/2025)    [employer] [place] [employer] - Occupational Stress Questionnaire     Feeling of Stress : Not at all   Social Connections: Moderately Isolated (4/12/2025)    Social Connection and Isolation Panel [NHANES]     Frequency of Communication with Friends and Family: More than three times a week     Frequency of Social Gatherings with Friends and Family: More than three times a week     Attends Anglican Services: Never     Active Member of Clubs or Organizations: No     Attends Club or Organization Meetings: Not on file     Marital Status:    Intimate Partner Violence: Not on file   Housing Stability: Low Risk  (4/12/2025)    Housing Stability Vital Sign     Unable to Pay for Housing in the Last Year: No     Number of Times Moved in the Last Year: 0     Homeless in the Last Year: No     No Known Allergies  Outpatient Encounter Medications as of 4/21/2025   Medication Sig Dispense Refill    oxyCODONE-acetaminophen (PERCOCET) 5-325 MG Tab Take 1 Tablet by mouth 1 time a day as needed for Severe Pain for up to 30 days. 30 Tablet 0    celecoxib (CELEBREX) 200 MG Cap TAKE 1 CAPSULE BY MOUTH EVERY DAY 90 Capsule 0    NON SPECIFIED Take 1,000 mg by mouth every day. DIOSMIN      Probiotic Product (PROBIOTIC BLEND PO) 1capsule daily      nystatin (MYCOSTATIN) 757045 UNIT/GM Cream topical cream APPLY 1 GM TO AFFECTED AREA(S) 2 TIMES A DAY. 90 g 2    B Complex Vitamins (VITAMIN B COMPLEX PO) every day.      Cholecalciferol (VITAMIN D3) 5000 UNITS Cap Take 1 Capsule by mouth every day.      FIBER PO Take  by mouth every day. 6 caps      diphenhydrAMINE (BENADRYL) 25 MG Tab Take 1 Tablet by mouth every 6 hours as needed for Sleep.      telmisartan (MICARDIS) 20 MG tablet TAKE 1 TABLET BY MOUTH EVERY   "Tablet 0     No facility-administered encounter medications on file as of 4/21/2025.     Review of Systems   Cardiovascular:  Positive for leg swelling.   Musculoskeletal:  Positive for joint pain.              Objective     /62 (BP Location: Left arm, Patient Position: Sitting, BP Cuff Size: Adult long)   Pulse 67   Resp 16   Ht 1.753 m (5' 9\")   Wt 117 kg (259 lb)   SpO2 95%   BMI 38.25 kg/m²     Physical Exam  Constitutional:       General: She is not in acute distress.     Appearance: She is not diaphoretic.   Eyes:      General: No scleral icterus.  Neck:      Vascular: No JVD.   Cardiovascular:      Rate and Rhythm: Normal rate.      Heart sounds: Normal heart sounds. No murmur heard.     No friction rub. No gallop.   Pulmonary:      Effort: No respiratory distress.      Breath sounds: No wheezing or rales.   Abdominal:      General: Bowel sounds are normal.      Palpations: Abdomen is soft.   Musculoskeletal:      Right lower leg: No edema.      Left lower leg: No edema.   Skin:     Findings: No rash.   Neurological:      Mental Status: She is alert. Mental status is at baseline.   Psychiatric:         Mood and Affect: Mood normal.            We reviewed in person the most recent labs  Recent Results (from the past 30 weeks)   Comp Metabolic Panel    Collection Time: 12/16/24  3:53 PM   Result Value Ref Range    Sodium 140 135 - 145 mmol/L    Potassium 4.0 3.6 - 5.5 mmol/L    Chloride 104 96 - 112 mmol/L    Co2 25 20 - 33 mmol/L    Anion Gap 11.0 7.0 - 16.0    Glucose 91 65 - 99 mg/dL    Bun 13 8 - 22 mg/dL    Creatinine 0.69 0.50 - 1.40 mg/dL    Calcium 9.8 8.5 - 10.5 mg/dL    Correct Calcium 9.8 8.5 - 10.5 mg/dL    AST(SGOT) 22 12 - 45 U/L    ALT(SGPT) 23 2 - 50 U/L    Alkaline Phosphatase 96 30 - 99 U/L    Total Bilirubin 0.7 0.1 - 1.5 mg/dL    Albumin 4.0 3.2 - 4.9 g/dL    Total Protein 7.0 6.0 - 8.2 g/dL    Globulin 3.0 1.9 - 3.5 g/dL    A-G Ratio 1.3 g/dL   TSH WITH REFLEX TO FT4    " Collection Time: 12/16/24  3:53 PM   Result Value Ref Range    TSH 1.960 0.380 - 5.330 uIU/mL   CBC WITH DIFFERENTIAL    Collection Time: 12/16/24  3:53 PM   Result Value Ref Range    WBC 9.2 4.8 - 10.8 K/uL    RBC 4.45 4.20 - 5.40 M/uL    Hemoglobin 13.7 12.0 - 16.0 g/dL    Hematocrit 42.9 37.0 - 47.0 %    MCV 96.4 81.4 - 97.8 fL    MCH 30.8 27.0 - 33.0 pg    MCHC 31.9 (L) 32.2 - 35.5 g/dL    RDW 47.8 35.9 - 50.0 fL    Platelet Count 317 164 - 446 K/uL    MPV 9.8 9.0 - 12.9 fL    Neutrophils-Polys 71.00 44.00 - 72.00 %    Lymphocytes 19.30 (L) 22.00 - 41.00 %    Monocytes 6.70 0.00 - 13.40 %    Eosinophils 1.60 0.00 - 6.90 %    Basophils 0.70 0.00 - 1.80 %    Immature Granulocytes 0.70 0.00 - 0.90 %    Nucleated RBC 0.00 0.00 - 0.20 /100 WBC    Neutrophils (Absolute) 6.54 1.82 - 7.42 K/uL    Lymphs (Absolute) 1.78 1.00 - 4.80 K/uL    Monos (Absolute) 0.62 0.00 - 0.85 K/uL    Eos (Absolute) 0.15 0.00 - 0.51 K/uL    Baso (Absolute) 0.06 0.00 - 0.12 K/uL    Immature Granulocytes (abs) 0.06 0.00 - 0.11 K/uL    NRBC (Absolute) 0.00 K/uL   ESTIMATED GFR    Collection Time: 12/16/24  3:53 PM   Result Value Ref Range    GFR (CKD-EPI) 93 >60 mL/min/1.73 m 2   EC-ECHOCARDIOGRAM COMPLETE W/O CONT    Collection Time: 02/24/25  9:02 AM   Result Value Ref Range    Eject.Frac. MOD BP 60.62     Eject.Frac. MOD 4C 66.17     Eject.Frac. MOD 2C 59.82     Left Ventrical Ejection Fraction 60          Assessment & Plan     1. Mild mitral regurgitation  proBrain Natriuretic Peptide, NT      2. Mild aortic regurgitation  proBrain Natriuretic Peptide, NT      3. Lymphedema  proBrain Natriuretic Peptide, NT      4. Obesity (BMI 30-39.9)            Medical Decision Making: Today's Assessment/Status/Plan:      It was my pleasure to meet with Ms. Michael.    Blood pressure is well controlled.  She will continue to monitor and eat hearty healthy diet.    Cholesterol is normal    Echo is benign, reasonable    Ms. Michael does not require regular  cardiology follow up, I have advised her to call our office or e-mail using my MyChart if needed.    It is my pleasure to participate in the care of Ms. Michael.  Please do not hesitate to contact me with questions or concerns.    Zev Dupree MD PhD [facility]  Cardiologist [facility]    Please note that this dictation was created using voice recognition software. There may be errors I did not discover before finalizing the note.

## 2025-05-28 ENCOUNTER — HOSPITAL ENCOUNTER (OUTPATIENT)
Dept: RADIOLOGY | Facility: MEDICAL CENTER | Age: 70
End: 2025-05-28
Payer: MEDICARE

## 2025-05-28 DIAGNOSIS — Z12.31 ENCOUNTER FOR SCREENING MAMMOGRAM FOR MALIGNANT NEOPLASM OF BREAST: ICD-10-CM

## 2025-05-28 PROCEDURE — 77067 SCR MAMMO BI INCL CAD: CPT

## 2025-06-02 ENCOUNTER — RESULTS FOLLOW-UP (OUTPATIENT)
Dept: MEDICAL GROUP | Facility: IMAGING CENTER | Age: 70
End: 2025-06-02
Payer: MEDICARE

## 2025-06-05 DIAGNOSIS — M18.0 PRIMARY ARTHROSIS OF FIRST CARPOMETACARPAL JOINTS, BILATERAL: ICD-10-CM

## 2025-06-05 RX ORDER — CELECOXIB 200 MG/1
200 CAPSULE ORAL
Qty: 90 CAPSULE | Refills: 0 | Status: SHIPPED | OUTPATIENT
Start: 2025-06-05

## 2025-06-05 NOTE — TELEPHONE ENCOUNTER
Received request via: Pharmacy    Was the patient seen in the last year in this department? Yes    Does the patient have an active prescription (recently filled or refills available) for medication(s) requested? No    Pharmacy Name: [id number]    Does the patient have nursing home Plus and need 100-day supply? (This applies to ALL medications) Yes, quantity updated to 100 days

## 2025-06-26 ENCOUNTER — HOSPITAL ENCOUNTER (OUTPATIENT)
Facility: MEDICAL CENTER | Age: 70
End: 2025-06-26
Attending: PHYSICIAN ASSISTANT
Payer: MEDICARE

## 2025-06-26 ENCOUNTER — OFFICE VISIT (OUTPATIENT)
Dept: MEDICAL GROUP | Facility: IMAGING CENTER | Age: 70
End: 2025-06-26
Payer: MEDICARE

## 2025-06-26 VITALS
BODY MASS INDEX: 36.14 KG/M2 | SYSTOLIC BLOOD PRESSURE: 122 MMHG | HEART RATE: 71 BPM | RESPIRATION RATE: 16 BRPM | OXYGEN SATURATION: 95 % | TEMPERATURE: 97 F | WEIGHT: 244 LBS | HEIGHT: 69 IN | DIASTOLIC BLOOD PRESSURE: 64 MMHG

## 2025-06-26 DIAGNOSIS — M79.605 CHRONIC PAIN OF LEFT LOWER EXTREMITY: ICD-10-CM

## 2025-06-26 DIAGNOSIS — I89.0 LYMPHEDEMA: Primary | ICD-10-CM

## 2025-06-26 DIAGNOSIS — Z79.899 HIGH RISK MEDICATION USE: ICD-10-CM

## 2025-06-26 DIAGNOSIS — G89.29 CHRONIC PAIN OF LEFT LOWER EXTREMITY: ICD-10-CM

## 2025-06-26 PROCEDURE — 80307 DRUG TEST PRSMV CHEM ANLYZR: CPT

## 2025-06-26 PROCEDURE — 99214 OFFICE O/P EST MOD 30 MIN: CPT | Performed by: PHYSICIAN ASSISTANT

## 2025-06-26 PROCEDURE — 3078F DIAST BP <80 MM HG: CPT | Performed by: PHYSICIAN ASSISTANT

## 2025-06-26 PROCEDURE — 3074F SYST BP LT 130 MM HG: CPT | Performed by: PHYSICIAN ASSISTANT

## 2025-06-26 RX ORDER — OXYCODONE AND ACETAMINOPHEN 5; 325 MG/1; MG/1
1 TABLET ORAL EVERY 8 HOURS PRN
Qty: 30 TABLET | Refills: 0 | Status: SHIPPED | OUTPATIENT
Start: 2025-06-26 | End: 2025-07-26

## 2025-06-26 ASSESSMENT — FIBROSIS 4 INDEX: FIB4 SCORE: 1.01

## 2025-06-26 NOTE — PROGRESS NOTES
"Subjective:     CC:   Chief Complaint   Patient presents with    Medication Refill       HPI:   Dorcas presents today to discuss:    Lymphedema  Patient with chronic lymphedema, left leg worse than right.  She wears compression stockings daily.  She takes Percocet for severe pain only, mostly when she travels.  She does have a consultation at the [facility] lymphedema clinic coming up.  She has had a workup in the past by vascular including ablation and MRA of the pelvis.    Past Medical History[1]  Family History   Problem Relation Age of Onset    Heart Disease Other     Other Mother         MS    Thyroid Daughter     GI Disease Other         Crohns    Thyroid Sister     Hyperlipidemia Sister     Hyperlipidemia Father      Past Surgical History[2]  Social History[3]  Social History     Social History Narrative    Not on file     Current Medications and Prescriptions Ordered in Epic[4]  Patient has no known allergies.    PMH/PSH/FH/Social history reviewed.  Vaccinations discussed.  Previous records and labs reviewed. Discussed age appropriate anticipatory guidance.    ROS: see hpi  Gen: no fevers/chills  Pulm: no sob, no cough  CV: no chest pain, no palpitations, no edema  GI: no nausea/vomiting, no diarrhea  Skin: no rash    Objective:   Exam:  /64 (BP Location: Left arm, Patient Position: Sitting, BP Cuff Size: Large adult)   Pulse 71   Temp 36.1 °C (97 °F) (Temporal)   Resp 16   Ht 1.753 m (5' 9\")   Wt 111 kg (244 lb)   SpO2 95%   BMI 36.03 kg/m²    Body mass index is 36.03 kg/m².    Gen: Alert and oriented, No apparent distress.  HEENT: Head atraumatic, normocephalic. Pupils equal and round.  Neck: Neck is supple without lymphadenopathy.   Lungs: Normal effort, CTA bilaterally, no wheezes, rhonchi, or rales  CV: Regular rate and rhythm. No murmurs, rubs, or gallops.  Ext: No clubbing, cyanosis.  Left lower extremity lymphedema noted.    Assessment & Plan:     69 y.o. female with the " following -     1. Lymphedema (Primary)  Chronic, uncontrolled.  Has upcoming appointment at [facility] lymphedema clinic.  Advised patient to obtain records from vascular specialist from her previous ablation and MRI to bring to the appointment.    2. Chronic pain of left lower extremity  Controlled substance discussed with client. Client agrees to abide by controlled substance contract.  - oxyCODONE-acetaminophen (PERCOCET) 5-325 MG Tab; Take 1 Tablet by mouth every 8 hours as needed for Severe Pain for up to 30 days.  Dispense: 30 Tablet; Refill: 0    3. High risk medication use  - URINE DRUG SCREEN; Future  - Controlled Substance Treatment Agreement    Return in about 3 months (around 9/26/2025) for Medication recheck.    Krista Onofre PA-C ([place])  Physician Assistant Certified  [facility]      Please note that this dictation was created using voice recognition software. I have made every reasonable attempt to correct obvious errors, but I expect that there are errors of grammar and possibly content that I did not discover before finalizing the note.         [1]   Past Medical History:  Diagnosis Date    Arthritis     both hands    Lymphedema 1995    bilateral legs    Pain     left knee 4/10    Pain     hands, feet    Pain     left hand   [2]   Past Surgical History:  Procedure Laterality Date    VENTRAL HERNIA REPAIR LAPAROSCOPIC  02/16/2021    Procedure: REPAIR, HERNIA, VENTRAL, LAPAROSCOPIC - UMBILICAL;  Surgeon: Lilliana Mejia M.D.;  Location: SURGERY SAME DAY [facility];  Service: General    HERNIA REPAIR  02/16/2021    Umbilical     FINGER ARTHROPLASTY Left 08/22/2017    Procedure: FINGER ARTHROPLASTY - CARPAL METACARPAL;  Surgeon: Magnus Anderson M.D.;  Location: SURGERY [facility];  Service:     TENDON TRANSFER Left 08/22/2017    Procedure: TENDON TRANSFER - FLEXI CARPI RADIALIS;  Surgeon: Magnus Anderson M.D.;  Location: SURGERY [facility];  Service:     CAPSULOTOMY   08/22/2017    Procedure: CAPSULOTOMY - METACARPAL PHALANGEAL JOINT CAPSULODESIS;  Surgeon: Magnus Anderson M.D.;  Location: SURGERY [facility];  Service:     FINGER ARTHROPLASTY Right 09/20/2016    Procedure: FINGER ARTHROPLASTY - CARPAL METACARPAL;  Surgeon: Magnus Anderson M.D.;  Location: SURGERY [facility];  Service:     TENDON TRANSFER Right 09/20/2016    Procedure: TENDON TRANSFER - FLEXI CARPI RADIALIS;  Surgeon: Magnus Anderson M.D.;  Location: SURGERY [facility];  Service:     PIN INSERTION Right 09/20/2016    Procedure: PIN INSERTION - METACARPAL PHALANGEAL JOINT;  Surgeon: Magnus Anderson M.D.;  Location: SURGERY [facility];  Service:     KNEE MANIPULATION  03/01/2010    Performed by OSGOOD, PATRICK J at [facility]    KNEE ARTHROPLASTY TOTAL  11/03/2009    Performed by OSGOOD, PATRICK J at [facility]    ACHILLES TENDON REP Right 05/2005    right    KNEE ARTHROPLASTY TOTAL Right 12/2003    right    KNEE ARTHROSCOPY Right 05/1998    right    RECTOCELE REPAIR  06/1990    repair cystocele    KNEE ARTHROTOMY Left 1973    left lateral meniscectomy    KNEE ARTHROTOMY Right 1971    right lateral meniscectomy    HAND SURGERY  2015, 2017    ORIF, KNEE  2003, 2008   [3]   Social History  Tobacco Use    Smoking status: Never    Smokeless tobacco: Never   Vaping Use    Vaping status: Never Used   Substance Use Topics    Alcohol use: Yes     Alcohol/week: 0.6 - 2.4 oz     Types: 1 - 4 Glasses of wine per week     Comment: 1-2 per day    Drug use: No   [4]   Current Outpatient Medications Ordered in Epic   Medication Sig Dispense Refill    Semaglutide,0.25 or 0.5MG/DOS, 2 MG/1.5ML Solution Pen-injector       oxyCODONE-acetaminophen (PERCOCET) 5-325 MG Tab Take 1 Tablet by mouth every 8 hours as needed for Severe Pain for up to 30 days. 30 Tablet 0    celecoxib (CELEBREX) 200 MG Cap TAKE 1 CAPSULE BY MOUTH EVERY DAY 90 Capsule 0    Probiotic Product (PROBIOTIC BLEND  PO) 1capsule daily      B Complex Vitamins (VITAMIN B COMPLEX PO) every day.      Cholecalciferol (VITAMIN D3) 5000 UNITS Cap Take 1 Capsule by mouth every day.      FIBER PO Take  by mouth every day. 6 caps      diphenhydrAMINE (BENADRYL) 25 MG Tab Take 1 Tablet by mouth every 6 hours as needed for Sleep.       No current Epic-ordered facility-administered medications on file.

## 2025-06-26 NOTE — ASSESSMENT & PLAN NOTE
Patient with chronic lymphedema, left leg worse than right.  She wears compression stockings daily.  She takes Percocet for severe pain only, mostly when she travels.  She does have a consultation at the [facility] lymphedema clinic coming up.  She has had a workup in the past by vascular including ablation and MRA of the pelvis.

## 2025-06-26 NOTE — PATIENT INSTRUCTIONS
It was a pleasure meeting with you today at [facility]!    Your medical history/records and medications were reviewed today.     UPDATE on MyChart Results: If you have blood work, and/or imaging studies, or any other test or procedure completed, you will have access to results as soon as they become available in MyChart. Recently, these results will be available for review at the same time that your provider is able to see results!    This will likely mean you will see a result before your provider has had a chance to review and discuss with you.  Some results or care notes may be hard to understand and may be serious in nature.    We look at every result and your provider will contact you to explain what they mean and discuss appropriate next steps. Please allow for at least 72 business hours for chart and result review.     We prefer that you wait for your care team to contact you with your results.  Often, your provider will discuss your results with you at your next appointment. We look forward to continuing to partner with you in your care.    Please review my practice information below:    If you have any prescription refill requests, please send them via [employer] or discuss with your provider at the start of your office visits. Please allow 3-5 business days for lab and testing review and you will be contacted via [employer] with those results, or if advised to make a follow up appointment regarding those results, then please do so.     Once resulted, your lab/test/imaging results will show up automatically in your MyChart. Please wait for my interpretation and recommendations prior to viewing your results to avoid any unnecessary confusion or misinterpretation. I will address all of the lab values that I interpret as abnormal and message you accordingly on your MyChart. I will always send you a message about your results even if they are normal. If you do not hear back from me within 5-7 business  days after completing your tests, then please send me a message on [email or web address] so I can obtain your results (especially if you went to an outside lab or imaging center - [facility], [facility], etc).     If you have any additional questions or concerns beyond my interpretation of your results, please make an appointment with me to discuss in further detail.    Please only use the [email or web address] messaging system for questions regarding your most recent appointment or if advised to use otherwise (glucose or blood pressure reporting).     If you have any new problems or concerns, you must make an appointment to discuss. This includes any referral requests, lab requests (unless advised to notify me for pre-appt labs), medication side effects, or request for medication adjustments.     Please arrive 15 minutes prior to your appointment time to complete your check-in and intake with the medical assistant.      Thank you,    Krista Onofre PA-C ([place])  Physician Assistant Certified  [facility]    -----------------------------------------------------------------    Attn: Patients of [facility]:    In an effort to continue to provide excellent and efficient care to our patients, it is vital that we continue to use our resources appropriately. With that, this is a reminder that [email or web address] is used for prescription refill requests, test results, virtual visits, and chart review only.     Any new questions, concerns/conditions, lab/imaging requests, medication adjustments, new prescriptions, or referral requests do require an appointment (virtually or in person), unless discussed otherwise at your most recent appointment.     Thank you for your understanding,    [facility]

## 2025-06-28 LAB
AMPHET CTO UR CFM-MCNC: NEGATIVE NG/ML
BARBITURATES CTO UR CFM-MCNC: NEGATIVE NG/ML
BENZODIAZ CTO UR CFM-MCNC: NEGATIVE NG/ML
CANNABINOIDS CTO UR CFM-MCNC: NEGATIVE NG/ML
COCAINE CTO UR CFM-MCNC: NEGATIVE NG/ML
CREAT UR-MCNC: 214.6 MG/DL (ref 20–400)
DRUG COMMENT 753798: NORMAL
METHADONE CTO UR CFM-MCNC: NEGATIVE NG/ML
OPIATES CTO UR CFM-MCNC: NEGATIVE NG/ML
PCP CTO UR CFM-MCNC: NEGATIVE NG/ML
PROPOXYPH CTO UR CFM-MCNC: NEGATIVE NG/ML

## 2025-08-02 ENCOUNTER — OFFICE VISIT (OUTPATIENT)
Dept: URGENT CARE | Facility: CLINIC | Age: 70
End: 2025-08-02
Payer: MEDICARE

## 2025-08-02 VITALS
RESPIRATION RATE: 12 BRPM | DIASTOLIC BLOOD PRESSURE: 76 MMHG | WEIGHT: 234.5 LBS | HEART RATE: 64 BPM | OXYGEN SATURATION: 97 % | BODY MASS INDEX: 34.73 KG/M2 | TEMPERATURE: 98.2 F | HEIGHT: 69 IN | SYSTOLIC BLOOD PRESSURE: 118 MMHG

## 2025-08-02 DIAGNOSIS — S81.819A LACERATION OF SHIN: Primary | ICD-10-CM

## 2025-08-02 PROCEDURE — 90471 IMMUNIZATION ADMIN: CPT

## 2025-08-02 PROCEDURE — 3074F SYST BP LT 130 MM HG: CPT

## 2025-08-02 PROCEDURE — 3078F DIAST BP <80 MM HG: CPT

## 2025-08-02 PROCEDURE — 90714 TD VACC NO PRESV 7 YRS+ IM: CPT

## 2025-08-02 PROCEDURE — 99213 OFFICE O/P EST LOW 20 MIN: CPT | Mod: 25

## 2025-08-02 ASSESSMENT — ENCOUNTER SYMPTOMS: FEVER: 0

## 2025-08-02 ASSESSMENT — FIBROSIS 4 INDEX: FIB4 SCORE: 1.01

## 2025-08-25 ENCOUNTER — OFFICE VISIT (OUTPATIENT)
Dept: MEDICAL GROUP | Facility: IMAGING CENTER | Age: 70
End: 2025-08-25
Payer: MEDICARE

## 2025-08-25 VITALS
TEMPERATURE: 97.1 F | DIASTOLIC BLOOD PRESSURE: 82 MMHG | SYSTOLIC BLOOD PRESSURE: 128 MMHG | HEART RATE: 61 BPM | BODY MASS INDEX: 34.07 KG/M2 | WEIGHT: 230 LBS | HEIGHT: 69 IN | RESPIRATION RATE: 16 BRPM | OXYGEN SATURATION: 99 %

## 2025-08-25 DIAGNOSIS — I89.0 LYMPHEDEMA: Primary | ICD-10-CM

## 2025-08-25 DIAGNOSIS — G89.4 CHRONIC PAIN SYNDROME: ICD-10-CM

## 2025-08-25 DIAGNOSIS — Z79.891 CHRONIC USE OF OPIATE DRUG FOR THERAPEUTIC PURPOSE: ICD-10-CM

## 2025-08-25 PROCEDURE — 99214 OFFICE O/P EST MOD 30 MIN: CPT

## 2025-08-25 PROCEDURE — 3079F DIAST BP 80-89 MM HG: CPT

## 2025-08-25 PROCEDURE — 3074F SYST BP LT 130 MM HG: CPT

## 2025-08-25 PROCEDURE — 1126F AMNT PAIN NOTED NONE PRSNT: CPT

## 2025-08-25 RX ORDER — OXYCODONE AND ACETAMINOPHEN 5; 325 MG/1; MG/1
1 TABLET ORAL EVERY 8 HOURS PRN
COMMUNITY
Start: 2025-06-26 | End: 2025-08-25 | Stop reason: SDUPTHER

## 2025-08-25 RX ORDER — OXYCODONE AND ACETAMINOPHEN 5; 325 MG/1; MG/1
1 TABLET ORAL
Qty: 30 TABLET | Refills: 0 | Status: SHIPPED | OUTPATIENT
Start: 2025-08-25 | End: 2025-09-24

## 2025-08-25 ASSESSMENT — LIFESTYLE VARIABLES
AUDIT-C TOTAL SCORE: 5
HOW OFTEN DO YOU HAVE A DRINK CONTAINING ALCOHOL: 4 OR MORE TIMES A WEEK
HOW MANY STANDARD DRINKS CONTAINING ALCOHOL DO YOU HAVE ON A TYPICAL DAY: 1 OR 2
SKIP TO QUESTIONS 9-10: 0
HOW OFTEN DO YOU HAVE SIX OR MORE DRINKS ON ONE OCCASION: LESS THAN MONTHLY

## 2025-08-25 ASSESSMENT — PAIN SCALES - GENERAL: PAINLEVEL_OUTOF10: NO PAIN

## 2025-08-25 ASSESSMENT — FIBROSIS 4 INDEX: FIB4 SCORE: 1.03

## (undated) DEVICE — ELECTRODE DUAL RETURN W/ CORD - (50/PK)

## (undated) DEVICE — SUTURE 4-0 ETHILON PS-2 18 (12PK/BX)"

## (undated) DEVICE — CHLORAPREP 26 ML APPLICATOR - ORANGE TINT(25/CA)

## (undated) DEVICE — SODIUM CHL IRRIGATION 0.9% 1000ML (12EA/CA)

## (undated) DEVICE — SUCTION INSTRUMENT YANKAUER BULBOUS TIP W/O VENT (50EA/CA)

## (undated) DEVICE — SLEEVE, VASO, THIGH, MED

## (undated) DEVICE — SUTURE GENERAL

## (undated) DEVICE — TROCAR LAPSCP 100MM 12MM NTHRD - (6/BX)

## (undated) DEVICE — ELECTRODE 850 FOAM ADHESIVE - HYDROGEL RADIOTRNSPRNT (50/PK)

## (undated) DEVICE — TUBE CONNECTING SUCTION - CLEAR PLASTIC STERILE 72 IN (50EA/CA)

## (undated) DEVICE — MASK ANESTHESIA ADULT  - (100/CA)

## (undated) DEVICE — HEAD HOLDER JUNIOR/ADULT

## (undated) DEVICE — LACTATED RINGERS INJ 1000 ML - (14EA/CA 60CA/PF)

## (undated) DEVICE — WATER IRRIGATION STERILE 1000ML (12EA/CA)

## (undated) DEVICE — SENSOR SPO2 NEO LNCS ADHESIVE (20/BX) SEE USER NOTES

## (undated) DEVICE — GLOVE BIOGEL INDICATOR SZ 8 SURGICAL PF LTX - (50/BX 4BX/CA)

## (undated) DEVICE — CANISTER SUCTION RIGID RED 1500CC (40EA/CA)

## (undated) DEVICE — DRESSING XEROFORM 1X8 - (50/BX 4BX/CA)

## (undated) DEVICE — DRAPE FLUOROSCAN C-ARM - 54 X 78 (40EA/CA)

## (undated) DEVICE — BAG, SPONGE COUNT 50600

## (undated) DEVICE — TUBING SETDISPOS HIGH FLOW II - (10/BX)

## (undated) DEVICE — TOURNIQUET CUFF 18 X 3 ONE PORT DISP - STERILE (10/BX)

## (undated) DEVICE — GLOVE BIOGEL INDICATOR SZ 6.5 SURGICAL PF LTX - (50PR/BX 4BX/CA)

## (undated) DEVICE — STOCKINET BIAS 4 IN STERILE - (20/CA)

## (undated) DEVICE — GOWN WARMING STANDARD FLEX - (30/CA)

## (undated) DEVICE — TUBING CLEARLINK DUO-VENT - C-FLO (48EA/CA)

## (undated) DEVICE — CANNULA W/SEAL 5X100 Z-THRE - ADED KII (12/BX)

## (undated) DEVICE — CANISTER SUCTION 3000ML MECHANICAL FILTER AUTO SHUTOFF MEDI-VAC NONSTERILE LF DISP  (40EA/CA)

## (undated) DEVICE — SLING ORTH UNV TIETX VLFM ARM

## (undated) DEVICE — GLOVE BIOGEL SZ 6 PF LATEX - (50EA/BX 4BX/CA)

## (undated) DEVICE — PADDING CAST 4 IN X 4 YDS - SOF-ROLL (12RL/BG 6BG/CT)

## (undated) DEVICE — PEN SKIN MARKER W/RULER - (50EA/BX)

## (undated) DEVICE — SPONGE GAUZE STER 4X4 8-PL - (2/PK 50PK/BX 12BX/CS)

## (undated) DEVICE — CATHETER IV 20 GA X 1-1/4 ---SURG.& SDS ONLY--- (50EA/BX)

## (undated) DEVICE — SPONGE GAUZESTER. 2X2 4-PL - (2/PK 50PK/BX 30BX/CS)

## (undated) DEVICE — DEVICE 5MM ABSRB STRAP FIXATION  (6EA/BX)

## (undated) DEVICE — PACK UPPER EXTREMITY SM OR - (3/CA)

## (undated) DEVICE — GLOVE, LITE (PAIR)

## (undated) DEVICE — BLADE SURGICAL CLIPPER - (50EA/CA)

## (undated) DEVICE — KIT ANESTHESIA W/CIRCUIT & 3/LT BAG W/FILTER (20EA/CA)

## (undated) DEVICE — SUTURE 0 ETHIBOND MO6 C/R - (12/BX) 8-18 INCH ETHICON

## (undated) DEVICE — TUBE NG SALEM SUMP 16FR (50EA/CA)

## (undated) DEVICE — MANIFOLD NEPTUNE 1 PORT (20/PK)

## (undated) DEVICE — DRAPESURG STERI-DRAPE LONG - (10/BX 4BX/CA)

## (undated) DEVICE — SUTURE 3-0 VICRYL PLUS RB-1 - 8 X 18 INCH (12/BX)

## (undated) DEVICE — HUMID-VENT HEAT AND MOISTURE EXCHANGE- (50/BX)

## (undated) DEVICE — PROTECTOR ULNA NERVE - (36PR/CA)

## (undated) DEVICE — SPLINT PLASTER 4 IN  X 15 IN - (50/BX 12BX/CA)

## (undated) DEVICE — KIT ROOM DECONTAMINATION

## (undated) DEVICE — PADDING CAST 4 IN STERILE - 4 X 4 YDS (24/CA)

## (undated) DEVICE — SUTURE 4-0 MONOCRYL PLUS PS-2 - 27 INCH (36/BX)

## (undated) DEVICE — SET LEADWIRE 5 LEAD BEDSIDE DISPOSABLE ECG (1SET OF 5/EA)

## (undated) DEVICE — GLOVE BIOGEL SZ 7 SURGICAL PF LTX - (50PR/BX 4BX/CA)

## (undated) DEVICE — DRESSING TRANSPARENT FILM TEGADERM 2.375 X 2.75"  (100EA/BX)"

## (undated) DEVICE — GLOVE BIOGEL SZ 6.5 SURGICAL PF LTX (50PR/BX 4BX/CA)

## (undated) DEVICE — DRAPE C-ARM LARGE 41IN X 74 IN - (10/BX 2BX/CA)

## (undated) DEVICE — GLOVE BIOGEL SZ 8 SURGICAL PF LTX - (50PR/BX 4BX/CA)

## (undated) DEVICE — DRESSING TRANSPARENT FILM TEGADERM 4 X 4.75" (50EA/BX)"

## (undated) DEVICE — KIT  I.V. START (100EA/CA)

## (undated) DEVICE — NEPTUNE 4 PORT MANIFOLD - (20/PK)

## (undated) DEVICE — TROCAR 5X100 BLADED Z-THREAD - KII (6/BX)

## (undated) DEVICE — SUTURE 0 VICRYL PLUS UR-6 - 27 INCH (36/BX)